# Patient Record
Sex: FEMALE | Race: WHITE | Employment: OTHER | URBAN - METROPOLITAN AREA
[De-identification: names, ages, dates, MRNs, and addresses within clinical notes are randomized per-mention and may not be internally consistent; named-entity substitution may affect disease eponyms.]

---

## 2017-01-30 ENCOUNTER — HOSPITAL ENCOUNTER (EMERGENCY)
Facility: CLINIC | Age: 77
Discharge: HOME | End: 2017-01-30
Attending: FAMILY MEDICINE | Admitting: FAMILY MEDICINE
Payer: MEDICARE

## 2017-01-30 VITALS
OXYGEN SATURATION: 95 % | SYSTOLIC BLOOD PRESSURE: 158 MMHG | DIASTOLIC BLOOD PRESSURE: 98 MMHG | HEART RATE: 94 BPM | RESPIRATION RATE: 20 BRPM | TEMPERATURE: 96.9 F

## 2017-01-30 DIAGNOSIS — F41.1 GENERALIZED ANXIETY DISORDER: ICD-10-CM

## 2017-01-30 DIAGNOSIS — F43.21 ADJUSTMENT DISORDER WITH DEPRESSED MOOD: ICD-10-CM

## 2017-01-30 LAB
ALBUMIN UR-MCNC: NEGATIVE MG/DL
ANION GAP SERPL CALCULATED.3IONS-SCNC: 8 MMOL/L (ref 3–14)
APPEARANCE UR: CLEAR
BASOPHILS # BLD AUTO: 0 10E9/L (ref 0–0.2)
BASOPHILS NFR BLD AUTO: 0.5 %
BILIRUB UR QL STRIP: NEGATIVE
BUN SERPL-MCNC: 19 MG/DL (ref 7–30)
CALCIUM SERPL-MCNC: 9.3 MG/DL (ref 8.5–10.1)
CAOX CRY #/AREA URNS HPF: ABNORMAL /HPF
CHLORIDE SERPL-SCNC: 106 MMOL/L (ref 94–109)
CO2 SERPL-SCNC: 33 MMOL/L (ref 20–32)
COLOR UR AUTO: YELLOW
CREAT SERPL-MCNC: 0.86 MG/DL (ref 0.52–1.04)
DIFFERENTIAL METHOD BLD: NORMAL
EOSINOPHIL # BLD AUTO: 0.1 10E9/L (ref 0–0.7)
EOSINOPHIL NFR BLD AUTO: 1.1 %
ERYTHROCYTE [DISTWIDTH] IN BLOOD BY AUTOMATED COUNT: 14.6 % (ref 10–15)
GFR SERPL CREATININE-BSD FRML MDRD: 64 ML/MIN/1.7M2
GLUCOSE SERPL-MCNC: 112 MG/DL (ref 70–99)
GLUCOSE UR STRIP-MCNC: NEGATIVE MG/DL
HCT VFR BLD AUTO: 40.2 % (ref 35–47)
HGB BLD-MCNC: 12.8 G/DL (ref 11.7–15.7)
HGB UR QL STRIP: ABNORMAL
IMM GRANULOCYTES # BLD: 0 10E9/L (ref 0–0.4)
IMM GRANULOCYTES NFR BLD: 0.2 %
KETONES UR STRIP-MCNC: NEGATIVE MG/DL
LEUKOCYTE ESTERASE UR QL STRIP: NEGATIVE
LYMPHOCYTES # BLD AUTO: 2.1 10E9/L (ref 0.8–5.3)
LYMPHOCYTES NFR BLD AUTO: 32.5 %
MCH RBC QN AUTO: 28.9 PG (ref 26.5–33)
MCHC RBC AUTO-ENTMCNC: 31.8 G/DL (ref 31.5–36.5)
MCV RBC AUTO: 91 FL (ref 78–100)
MONOCYTES # BLD AUTO: 0.5 10E9/L (ref 0–1.3)
MONOCYTES NFR BLD AUTO: 8.4 %
NEUTROPHILS # BLD AUTO: 3.6 10E9/L (ref 1.6–8.3)
NEUTROPHILS NFR BLD AUTO: 57.3 %
NITRATE UR QL: NEGATIVE
PH UR STRIP: 6 PH (ref 5–7)
PLATELET # BLD AUTO: 257 10E9/L (ref 150–450)
POTASSIUM SERPL-SCNC: 3.6 MMOL/L (ref 3.4–5.3)
RBC # BLD AUTO: 4.43 10E12/L (ref 3.8–5.2)
RBC #/AREA URNS AUTO: ABNORMAL /HPF (ref 0–2)
SODIUM SERPL-SCNC: 147 MMOL/L (ref 133–144)
SP GR UR STRIP: 1.01 (ref 1–1.03)
TSH SERPL DL<=0.005 MIU/L-ACNC: 2.04 MU/L (ref 0.4–4)
URN SPEC COLLECT METH UR: ABNORMAL
UROBILINOGEN UR STRIP-ACNC: 0.2 EU/DL (ref 0.2–1)
WBC # BLD AUTO: 6.3 10E9/L (ref 4–11)
WBC #/AREA URNS AUTO: ABNORMAL /HPF (ref 0–2)

## 2017-01-30 PROCEDURE — 84443 ASSAY THYROID STIM HORMONE: CPT | Performed by: FAMILY MEDICINE

## 2017-01-30 PROCEDURE — 85025 COMPLETE CBC W/AUTO DIFF WBC: CPT | Performed by: FAMILY MEDICINE

## 2017-01-30 PROCEDURE — 93005 ELECTROCARDIOGRAM TRACING: CPT

## 2017-01-30 PROCEDURE — 80048 BASIC METABOLIC PNL TOTAL CA: CPT | Performed by: FAMILY MEDICINE

## 2017-01-30 PROCEDURE — 90791 PSYCH DIAGNOSTIC EVALUATION: CPT

## 2017-01-30 PROCEDURE — 99285 EMERGENCY DEPT VISIT HI MDM: CPT | Mod: 25

## 2017-01-30 PROCEDURE — 81001 URINALYSIS AUTO W/SCOPE: CPT | Performed by: FAMILY MEDICINE

## 2017-01-30 ASSESSMENT — ENCOUNTER SYMPTOMS
SHORTNESS OF BREATH: 0
WEAKNESS: 1
MUSCULOSKELETAL NEGATIVE: 1
NECK PAIN: 0
ALLERGIC/IMMUNOLOGIC NEGATIVE: 1
LIGHT-HEADEDNESS: 0
RESPIRATORY NEGATIVE: 1
APPETITE CHANGE: 0
CONSTIPATION: 0
HEMATURIA: 0
CHEST TIGHTNESS: 0
FEVER: 0
HEADACHES: 0
PALPITATIONS: 0
DYSURIA: 0
CHILLS: 0
FATIGUE: 1
FLANK PAIN: 0
VOMITING: 0
NAUSEA: 0
NUMBNESS: 0
DIAPHORESIS: 0
EYES NEGATIVE: 1
ENDOCRINE NEGATIVE: 1

## 2017-01-30 NOTE — ED AVS SNAPSHOT
[facility] Emergency Department    [address] [place] [address]    Phone:  [phone]    Fax:  [phone]                                       Monik Santiago   MRN: [id number]    Department:  [facility] Emergency Department   Date of Visit:  1/30/2017           After Visit Summary Signature Page     I have received my discharge instructions, and my questions have been answered. I have discussed any challenges I see with this plan with the nurse or doctor.    ..........................................................................................................................................  Patient/Patient Representative Signature      ..........................................................................................................................................  Patient Representative Print Name and Relationship to Patient    ..................................................               ................................................  Date                                            Time    ..........................................................................................................................................  Reviewed by Signature/Title    ...................................................              ..............................................  Date                                                            Time

## 2017-01-30 NOTE — ED AVS SNAPSHOT
[facility] Emergency Department    [address]     [place] [address]    Phone:  [phone]    Fax:  [phone]                                       Monik Santiago   MRN: [id number]    Department:  [facility] Emergency Department   Date of Visit:  1/30/2017           Patient Information     Date Of Birth          1940        Your diagnoses for this visit were:     Adjustment disorder with depressed mood     Generalized anxiety disorder        You were seen by Kaz Harrell DO.      Follow-up Information     Follow up with Ken Mar MD.    Specialty:  Internal Medicine    Why:  for recheck this next month    Contact information:    [facility]  [address] [place] [address] [phone]          Follow up with [facility] Emergency Department.    Specialty:  EMERGENCY MEDICINE    Why:  If symptoms worsen    Contact information:    [address]   [place] [address] [phone]    Additional information:    From [address] beds at the [facility] [address]: Exit at [address] on [address] side of [place]. Turn right on [address]. Turn left at stoplight on [address]. [facility] will be in view two blocks ahead        Discharge Instructions       Please read and follow the handout(s) instructions. Return, if needed, for increased or worsening symptoms and as directed by the handout(s).    I sent a request to our hospital social  to contact you and your family later this week about options for alternative living options for you.     Continue your current medications. Your heart and metabolic screening test done in the ER [facility] all looked normal.    Electronically signed, Kaz Harrell DO          Discharge References/Attachments     ADJUSTMENT DISORDER (ENGLISH)      24 Hour Appointment Hotline       To make an appointment at any [facility], call [phone] ([phone]). If you don't have a family doctor  or clinic, we will help you find one. [facility] are conveniently located to serve the needs of you and your family.             Review of your medicines      Our records show that you are taking the medicines listed below. If these are incorrect, please call your family doctor or clinic.        Dose / Directions Last dose taken    ACETAMINOPHEN PO   Dose:  500-1000 mg        Take 500-1,000 mg by mouth every 8 hours as needed for pain   Refills:  0        ascorbic acid 250 MG Chew chewable tablet   Commonly known as:  vitamin C   Dose:  250 mg        Take 250 mg by mouth daily   Refills:  0        ASPIRIN PO   Dose:  81 mg        Take 81 mg by mouth daily   Refills:  0        atorvastatin 40 MG tablet   Commonly known as:  LIPITOR   Dose:  40 mg   Quantity:  90 tablet        Take 1 tablet (40 mg) by mouth daily   Refills:  3        budesonide 3 MG EC capsule   Commonly known as:  ENTOCORT EC   Dose:  3 mg        Take 3 mg by mouth every other day   Refills:  0        * CYMBALTA PO   Dose:  20 mg        Take 20 mg by mouth daily   Refills:  0        * DULoxetine 20 MG EC capsule   Commonly known as:  CYMBALTA   Dose:  20 mg   Quantity:  60 capsule        Take 1 capsule (20 mg) by mouth daily   Refills:  1        furosemide 20 MG tablet   Commonly known as:  LASIX   Dose:  10 mg   Quantity:  45 tablet        Take 0.5 tablets (10 mg) by mouth daily   Refills:  1        lisinopril 5 MG tablet   Commonly known as:  PRINIVIL/ZESTRIL   Dose:  5 mg   Quantity:  90 tablet        Take 1 tablet (5 mg) by mouth daily   Refills:  2        Lysine 500 MG Tabs   Dose:  1 tablet        Take 1 tablet by mouth daily   Refills:  0        metoprolol 25 MG tablet   Commonly known as:  LOPRESSOR   Dose:  12.5 mg   Quantity:  90 tablet        Take 0.5 tablets (12.5 mg) by mouth 2 times daily   Refills:  3        MULTIVITAMIN PO   Dose:  1 tablet        Take 1 tablet by mouth daily   Refills:  0        NITROGLYCERIN SL   Dose:  0.4 mg         Place 0.4 mg under the tongue   Refills:  0        ondansetron 4 MG tablet   Commonly known as:  ZOFRAN   Dose:  4 mg   Quantity:  10 tablet        Take 1 tablet (4 mg) by mouth every 6 hours as needed for nausea   Refills:  0        oxyCODONE 5 MG IR tablet   Commonly known as:  ROXICODONE   Dose:  5-10 mg   Quantity:  30 tablet        Take 1-2 tablets (5-10 mg) by mouth every 6 hours as needed for moderate to severe pain   Refills:  0        saccharomyces boulardii 250 MG capsule   Commonly known as:  FLORASTOR   Dose:  250 mg        Take 250 mg by mouth 2 times daily   Refills:  0        * SEROQUEL PO   Dose:  25 mg        Take 25 mg by mouth every 4 hours as needed   Refills:  0        * QUEtiapine 25 MG tablet   Commonly known as:  SEROQUEL   Quantity:  30 tablet        1 tab every 4 hours as needed, max of 3 in 24 hours   Refills:  1        * TRAZODONE HCL PO   Dose:  50 mg        Take 50 mg by mouth nightly as needed for sleep   Refills:  0        * traZODone 50 MG tablet   Commonly known as:  DESYREL   Dose:  50 mg   Quantity:  30 tablet        Take 1 tablet (50 mg) by mouth nightly as needed for sleep   Refills:  1        * Notice:  This list has 6 medication(s) that are the same as other medications prescribed for you. Read the directions carefully, and ask your doctor or other care provider to review them with you.            Procedures and tests performed during your visit     Basic metabolic panel    CBC with platelets differential    EKG 12-lead, tracing only    TSH with free T4 reflex    UA reflex to Microscopic and Culture    Urine Microscopic      Orders Needing Specimen Collection     None      Pending Results     No orders found from 1/29/2017 to 1/31/2017.            Pending Culture Results     No orders found from 1/29/2017 to 1/31/2017.            Thank you for choosing [facility]       Thank you for choosing [facility] for your care. Our goal is always to provide you with excellent care.  "Hearing back from our patients is one way we can continue to improve our services. Please take a few minutes to complete the written survey that you may receive in the mail after you visit with us. Thank you!        [employer] Information     [employer] lets you send messages to your doctor, view your test results, renew your prescriptions, schedule appointments and more. To sign up, go to [email or web address] . Click on \"Log in\" on the left side of the screen, which will take you to the Welcome page. Then click on \"Sign up Now\" on the right side of the page.     You will be asked to enter the access code listed below, as well as some personal information. Please follow the directions to create your username and password.     Your access code is: [email or web address]  Expires: 2017  2:33 PM     Your access code will  in 90 days. If you need help or a new code, please call your [place] clinic or [phone].        Care EveryWhere ID     This is your Care EveryWhere ID. This could be used by other organizations to access your [facility] medical records  [id number]        After Visit Summary       This is your record. Keep this with you and show to your community pharmacist(s) and doctor(s) at your next visit.                  "

## 2017-01-31 ENCOUNTER — CARE COORDINATION (OUTPATIENT)
Dept: CARE COORDINATION | Facility: CLINIC | Age: 77
End: 2017-01-31

## 2017-01-31 NOTE — ED PROVIDER NOTES
"  History     Chief Complaint   Patient presents with     Stress     The history is provided by the patient and the EMS personnel.     Monik Santiago is a 76 year old female with a history of dementia who presents to the ED via EMS with suicidal threats. Patient has been living with her daughter and son-in-law for the past 8 years. The last couple of days they have gotten into arguments. Patient was diagnosed with dementia in November, 2 months ago. Her daughter has been encouraging her to live in a nursing home but the patient refuses. The last couple of days the patient has been saying \" I rather die then live in a nursing home.\" Patient states she is not suicidal that she said that because her daughter made her so upset. She thinks her daughter is to emotional. Patient wants to move to her other [facility] house who lives in [place] but the daughter has said no as she is an alcoholic. EMS reported that they had the sense that the daughter did not want her mother back in their home. Patient has a history of anxiety, colitis and hypertension and is taking medication for them. Her colitis has been \"on and off.\" EMS reported she has been cooperative in transport to the ER. Glucose at the scene was 94.    Nurse had called daughter, note below:   Spoke with Daughter Maria E Maloney [phone].  She expressed her frustrations with their current living situation as things are becoming a \"hostile environment.\"  Family had patient assess in [place] and they were advised that she could benefit from assisted living as she has been isolating herself in a sitting room in [facility] and moderate dementia.  Daughter states patient has heart history and should be doing therapy and she is not wanting to do the therapies or anything during the day.  Daughter has been talking with other siblings and they have decided that an assisted living might be a better living situation for Monik.  Daughter confronted patient today about " "living situation and she became upset and stated \"she would rather be dead then live in a nursing home.\"  Daughter left and when she came back she talked to a friend with medical background who told her she needed to take her mother serious and to call the authorities, Crisis center was called by daughter and they thought it might not be a bad idea to have patient evaluated.  Daughter does not feel that she wants her mother back in the house, stating she would rather \"live with my alcoholic sister then here.\"  Informed daughter we would call her back with an update if okay with patient.      Patient Active Problem List   Diagnosis     Other alteration of consciousness     Generalized anxiety disorder     GERD (gastroesophageal reflux disease)     Osteoarthritis     Hyperlipidemia LDL goal <70     Hypertension goal BP (blood pressure) < 140/90     Advanced directives, counseling/discussion     Acute myocardial infarction (H)     Hypoxia     NSTEMI (non-ST elevated myocardial infarction), history     Chest pain, atypical     ASCVD (arteriosclerotic cardiovascular disease)     CKD (chronic kidney disease) stage 3, GFR 30-59 ml/min     Acute worsening of stage 3 chronic kidney disease     Systolic CHF, chronic (H)     Anemia     DVT prophylaxis     Lichen sclerosus et atrophicus of the vulva     Near syncope     Coronary atherosclerosis of native coronary artery (VESSEL)     Abdominal pain, unspecified abdominal location     Health Care Home     Syncope     Pseudoseizure     Acidosis-metabolic     Nausea without vomiting     Recent repair of hiatal hernia 1/30/15     Urine retention - singh placed 2/4/15     Bilateral leg edema     Old myocardial infarction 2012     Lightheadedness     Lightheaded     Altered mental status     Headache     History of C.diff colitis     Anxiety     History of MRI of brain and brain stem     RUQ abdominal pain     S/P laparoscopic cholecystectomy     Past Medical History   Diagnosis Date "     Other alteration of consciousness 7/2007     see neuro consult 7/25/2007. MinCep THOMSON was neg. Does not have seizures.      Other and unspecified hyperlipidemia      Generalized anxiety disorder      GERD (gastroesophageal reflux disease)      Syncope 10/19/2009     related to BP medications     Unspecified essential hypertension      Clostridium difficile diarrhea      Coronary artery disease      Myocardial infarction (H)      NSTEMI     History of MRI of brain and brain stem 4/10/2015     MR BRAIN FOR STROKE COMPLETE W/O & W CONTRAST 4/9/2015 9:34 PM MRI of the brain without and with contrast MRA of the head without contrast MRA of the neck without and with contrast History: eval for PRESS, Comparison: 3/19/2015,  Technique:  Brain: Axial diffusion-weighted with ADC map, T2-weighted with fat saturation, T1-weighted and turboFLAIR and coronal T1-weighted images of the brain were obt       Past Surgical History   Procedure Laterality Date     C nonspecific procedure       abd hernia repair     Hc remv cataract extracap,insert lens,comp       darrian     Hc yag laser capsulotomy  9/17/2009     Right eye     Hc ugi endoscopy diag w biopsy  12/16/09     C total knee arthroplasty  08/23/10     Right knee     Mohs micrographic procedure  09/14/11     left upper forehead-09/14/11     Cardiac stents       Heart cath, angioplasty  10/03/2012     Stent of LAD     Heart cath, angioplasty  05/17/2014      CAD, patent stent of LAD     Esophagoscopy, gastroscopy, duodenoscopy (egd), combined N/A 12/17/2014     Procedure: COMBINED ESOPHAGOSCOPY, GASTROSCOPY, DUODENOSCOPY (EGD);  Surgeon: Kaz Mccoy MD;  Location: [facility]     Laparoscopic herniorrhaphy hiatal N/A 1/30/2015     Procedure: LAPAROSCOPIC HERNIORRHAPHY HIATAL;  Surgeon: Chase Camara MD;  Location: [facility]     Laparoscopic nissen fundoplication N/A 1/30/2015     Procedure: LAPAROSCOPIC NISSEN FUNDOPLICATION;  Surgeon: Chase Camara MD;  Location:  [facility]     Colonoscopy N/A 2/17/2016     Procedure: COMBINED COLONOSCOPY, SINGLE OR MULTIPLE BIOPSY/POLYPECTOMY BY BIOPSY;  Surgeon: Jose Gan MD;  Location: [facility]     Laparoscopic cholecystectomy N/A 12/3/2016     Procedure: LAPAROSCOPIC CHOLECYSTECTOMY;  Surgeon: Viral Meyers MD;  Location: [facility]       Family History   Problem Relation Age of Onset     DIABETES No family hx of      Cardiovascular Brother      Cardiovascular Father      Cardiovascular Mother      Hypertension Mother      Lipids Mother      Cardiovascular Sister      stents x 2-3     Hypertension Sister      Cardiovascular Sister      MI 64       Social History   Substance Use Topics     Smoking status: Never Smoker      Smokeless tobacco: Never Used     Alcohol Use: No        Immunization History   Administered Date(s) Administered     Influenza (High Dose) 3 valent vaccine 11/15/2011, 10/22/2012, 10/08/2013, 01/13/2015, 11/08/2016     Influenza (IIV3) 10/21/2009, 10/04/2010     Pneumococcal 23 valent 10/21/2009     TDAP (BOOSTRIX AGES 10-64) 05/16/2012          Allergies   Allergen Reactions     Codeine Fatigue     Sulfa Drugs Hives     Trimethoprim Hives     Valium Fatigue       Current Outpatient Prescriptions   Medication Sig Dispense Refill     traZODone (DESYREL) 50 MG tablet Take 1 tablet (50 mg) by mouth nightly as needed for sleep 30 tablet 1     lisinopril (PRINIVIL/ZESTRIL) 5 MG tablet Take 1 tablet (5 mg) by mouth daily 90 tablet 2     DULoxetine (CYMBALTA) 20 MG EC capsule Take 1 capsule (20 mg) by mouth daily 60 capsule 1     QUEtiapine (SEROQUEL) 25 MG tablet 1 tab every 4 hours as needed, max of 3 in 24 hours 30 tablet 1     ondansetron (ZOFRAN) 4 MG tablet Take 1 tablet (4 mg) by mouth every 6 hours as needed for nausea 10 tablet 0     oxyCODONE (ROXICODONE) 5 MG IR tablet Take 1-2 tablets (5-10 mg) by mouth every 6 hours as needed for moderate to severe pain 30 tablet 0     TRAZODONE HCL PO Take 50 mg by mouth  nightly as needed for sleep       QUEtiapine Fumarate (SEROQUEL PO) Take 25 mg by mouth every 4 hours as needed       DULoxetine HCl (CYMBALTA PO) Take 20 mg by mouth daily       furosemide (LASIX) 20 MG tablet Take 0.5 tablets (10 mg) by mouth daily 45 tablet 1     metoprolol (LOPRESSOR) 25 MG tablet Take 0.5 tablets (12.5 mg) by mouth 2 times daily 90 tablet 3     budesonide (ENTOCORT EC) 3 MG 24 hr capsule Take 3 mg by mouth every other day       atorvastatin (LIPITOR) 40 MG tablet Take 1 tablet (40 mg) by mouth daily 90 tablet 3     saccharomyces boulardii (FLORASTOR) 250 MG capsule Take 250 mg by mouth 2 times daily       NITROGLYCERIN SL Place 0.4 mg under the tongue       ASPIRIN PO Take 81 mg by mouth daily       ACETAMINOPHEN PO Take 500-1,000 mg by mouth every 8 hours as needed for pain       ascorbic acid (VITAMIN C) 250 MG CHEW Take 250 mg by mouth daily       Lysine 500 MG TABS Take 1 tablet by mouth daily       Multiple Vitamins-Minerals (MULTIVITAMIN OR) Take 1 tablet by mouth daily         I have reviewed the Medications, Allergies, Past Medical and Surgical History, and Social History in the Epic system.    Review of Systems   Constitutional: Positive for fatigue. Negative for fever, chills, diaphoresis and appetite change.        Positive for stress.    HENT: Negative.    Eyes: Negative.    Respiratory: Negative.  Negative for chest tightness and shortness of breath.    Cardiovascular: Negative for chest pain, palpitations and leg swelling.   Gastrointestinal: Negative for nausea, vomiting and constipation.   Endocrine: Negative.    Genitourinary: Negative.  Negative for dysuria, hematuria and flank pain.   Musculoskeletal: Negative.  Negative for neck pain.   Skin: Negative.  Negative for rash.   Allergic/Immunologic: Negative.    Neurological: Positive for weakness (generalized). Negative for light-headedness, numbness and headaches.   Psychiatric/Behavioral: Negative for suicidal ideas.   All  other systems reviewed and are negative.      Physical Exam   Pulse: 94  Temp: 96.8  F (36  C)  Resp: 18  SpO2: 99 %  Physical Exam   Constitutional: She is oriented to person, place, and time. Vital signs are normal. She appears well-developed and well-nourished. She is active and cooperative. No distress.   HENT:   Head: Normocephalic and atraumatic.   Eyes: Conjunctivae and EOM are normal. Pupils are equal, round, and reactive to light.   Glasses     Neck: Normal range of motion. Neck supple. No JVD present. Carotid bruit is not present.   Cardiovascular: Normal rate, regular rhythm, normal heart sounds, intact distal pulses and normal pulses.    Pulmonary/Chest: Effort normal and breath sounds normal. She has no wheezes. She has no rhonchi. She has no rales.   Abdominal: Soft. She exhibits no pulsatile midline mass. There is no tenderness.   Musculoskeletal: Normal range of motion.   Neurological: She is alert and oriented to person, place, and time. She has normal strength.   Skin: Skin is warm and intact. No rash noted. She is not diaphoretic.   Psychiatric: Her speech is normal and behavior is normal. Her mood appears anxious. Her affect is not angry, not labile and not inappropriate. Thought content is not delusional. She does not express impulsivity. She does not exhibit a depressed mood. She expresses no homicidal ideation. Suicidal: denies sucidal plan or thoughts. She states she was just frustrated with the living situation in her daughter's home. She expresses no suicidal plans and no homicidal plans.   No significant memory impairment identified on exam today.   Nursing note and vitals reviewed.      ED Course   Procedures             Critical Care time:  none          Results for orders placed or performed during the hospital encounter of 01/30/17 (from the past 24 hour(s))   UA reflex to Microscopic and Culture   Result Value Ref Range    Color Urine Yellow     Appearance Urine Clear     Glucose  Urine Negative NEG mg/dL    Bilirubin Urine Negative NEG    Ketones Urine Negative NEG mg/dL    Specific Gravity Urine 1.015 1.003 - 1.035    Blood Urine Trace (A) NEG    pH Urine 6.0 5.0 - 7.0 pH    Protein Albumin Urine Negative NEG mg/dL    Urobilinogen Urine 0.2 0.2 - 1.0 EU/dL    Nitrite Urine Negative NEG    Leukocyte Esterase Urine Negative NEG    Source Unspecified Urine    Urine Microscopic   Result Value Ref Range    WBC Urine O - 2 0 - 2 /HPF    RBC Urine 2-5 (A) 0 - 2 /HPF    Calcium Oxalate Few (A) NEG /HPF   CBC with platelets differential   Result Value Ref Range    WBC 6.3 4.0 - 11.0 10e9/L    RBC Count 4.43 3.8 - 5.2 10e12/L    Hemoglobin 12.8 11.7 - 15.7 g/dL    Hematocrit 40.2 35.0 - 47.0 %    MCV 91 78 - 100 fl    MCH 28.9 26.5 - 33.0 pg    MCHC 31.8 31.5 - 36.5 g/dL    RDW 14.6 10.0 - 15.0 %    Platelet Count 257 150 - 450 10e9/L    Diff Method Automated Method     % Neutrophils 57.3 %    % Lymphocytes 32.5 %    % Monocytes 8.4 %    % Eosinophils 1.1 %    % Basophils 0.5 %    % Immature Granulocytes 0.2 %    Absolute Neutrophil 3.6 1.6 - 8.3 10e9/L    Absolute Lymphocytes 2.1 0.8 - 5.3 10e9/L    Absolute Monocytes 0.5 0.0 - 1.3 10e9/L    Absolute Eosinophils 0.1 0.0 - 0.7 10e9/L    Absolute Basophils 0.0 0.0 - 0.2 10e9/L    Abs Immature Granulocytes 0.0 0 - 0.4 10e9/L   Basic metabolic panel   Result Value Ref Range    Sodium 147 (H) 133 - 144 mmol/L    Potassium 3.6 3.4 - 5.3 mmol/L    Chloride 106 94 - 109 mmol/L    Carbon Dioxide 33 (H) 20 - 32 mmol/L    Anion Gap 8 3 - 14 mmol/L    Glucose 112 (H) 70 - 99 mg/dL    Urea Nitrogen 19 7 - 30 mg/dL    Creatinine 0.86 0.52 - 1.04 mg/dL    GFR Estimate 64 >60 mL/min/1.7m2    GFR Estimate If Black 77 >60 mL/min/1.7m2    Calcium 9.3 8.5 - 10.1 mg/dL   TSH with free T4 reflex   Result Value Ref Range    TSH 2.04 0.40 - 4.00 mU/L         Assessments & Plan (with Medical Decision Making)    76-year-old female presenting to the emergency room via  ambulance from her daughter's home where she has lived for the last 8 years because of concerns about suicidal ideation.  Patient admits to making a statement stating that she be better off dead than to continue living in her current situation with her daughter.  Patient states that she would rather live with her other daughter who lives in [place].  She admits that this daughter in [place] and is an alcoholic but states that she did well with living with her in the past.  The patient's daughter was contacted and is frustrated with the patient's unwillingness to do any rehabilitation for her dementia and cardiac issues.  The daughter is currently unwilling to take her mother back to her home.  Metabolic work up done because of some concerns about weakness in the part of the patient. Her metabolic and cardiac screening exam is unremarkable for abnormality. DEC evaluation performed and no acute inpatient stay recommended. Resources for counseling services were identified and given to the patient. The patient's daughter did call back to the ER and stated she would be willing to come to get her mother. I sent an order for a social service consult  To possible help to identify other alternative living arrangements for the patient. No new medications prescribed.       I have reviewed the nursing notes.    I have reviewed the findings, diagnosis, plan and need for follow up with the patient and her daughter.    Discharge Medication List as of 1/30/2017 11:44 PM        I discussed the findings of the evaluation today in the ER with her daughter. I have discussed with Monik's daughter the suggested treatment(s) as described in the discharge instructions and handouts.      I have suggested to her daughter to have her follow-up in her clinic or return to the ER for increased symptoms. See the follow-up recommendations documented  in the after visit summary in this visit's EPIC chart.      Final diagnoses:   Adjustment  disorder with depressed mood   Generalized anxiety disorder   This document serves as a record of services personally performed by Kaz Harrell DO. It was created on their behalf by Yessy Kunz, a trained medical scribe. The creation of this record is based on the provider's personal observations and the statements of the patient. This document has been checked and approved by the attending provider.   Note: Chart documentation done in part with Dragon Voice Recognition software. Although reviewed after completion, some word and grammatical errors may remain.        1/30/2017   [facility] EMERGENCY DEPARTMENT      Kaz Harrell DO  01/31/17 0156

## 2017-01-31 NOTE — DISCHARGE INSTRUCTIONS
Please read and follow the handout(s) instructions. Return, if needed, for increased or worsening symptoms and as directed by the handout(s).    I sent a request to our hospital social  to contact you and your family later this week about options for alternative living options for you.     Continue your current medications. Your heart and metabolic screening test done in the [facility] all looked normal.    Electronically signed, Kaz Harrell DO

## 2017-01-31 NOTE — PROGRESS NOTES
Clinic Care Coordination Contact 1/31/17  Care Team Conversations-Social Work    Received referral from the clinic at [place] as they had a VM on their phone line asking for a return call to the pt's daughter as the pt was int he ED last night. This writer contacted Maria E, pt's daughter, and introduced self and role. Maria E explained that her mother lives with her in her own home and she has been her primary care giver.  She said she is feeling overwhelmed with the pt and her cares. She spoke to the pt about her concerns and explained to her that she wanted her to move to an AL or LTC. The pt became upset with her. Maria E was asking what options she has and what her next step should be. She has been in contact with [facility] for a [place] choice assessment. Discussion about the process of choosing a facility and placing pt on a waiting list. List of facilities in the area were provided. Maria E states the pt has approx 8000.00 in her savings and makes about 1000 per month. Maria E will assist the pt in applying for MA either with the On license of [facility] or [employer].     Maria E explained that the pt had been in the [facility] a while back and at the time of discharge, they were recommending AL. Maria E opted to bring the pt home instead of placing her at an AL at that time.     Conversation with Maria E about applying for MA, touring AL/LTC facilities and placing her mother on waiting lists. She will call this writer back after she has completed these tasks and we can discuss temporary supports she can put into the home until the pt is accepted.      GRETCHEN Mcrae  Care Coordinator Social Work    [facility]  [phone]  1/31/2017 12:00 PM

## 2017-02-13 ENCOUNTER — OFFICE VISIT (OUTPATIENT)
Dept: INTERNAL MEDICINE | Facility: CLINIC | Age: 77
End: 2017-02-13
Payer: MEDICARE

## 2017-02-13 VITALS
TEMPERATURE: 98.4 F | WEIGHT: 148 LBS | SYSTOLIC BLOOD PRESSURE: 144 MMHG | HEART RATE: 88 BPM | RESPIRATION RATE: 16 BRPM | DIASTOLIC BLOOD PRESSURE: 76 MMHG | OXYGEN SATURATION: 95 % | BODY MASS INDEX: 26.22 KG/M2

## 2017-02-13 DIAGNOSIS — F43.23 ADJUSTMENT DISORDER WITH MIXED ANXIETY AND DEPRESSED MOOD: Primary | ICD-10-CM

## 2017-02-13 DIAGNOSIS — I10 ESSENTIAL HYPERTENSION WITH GOAL BLOOD PRESSURE LESS THAN 140/90: ICD-10-CM

## 2017-02-13 PROCEDURE — 99214 OFFICE O/P EST MOD 30 MIN: CPT | Performed by: INTERNAL MEDICINE

## 2017-02-13 RX ORDER — METOPROLOL TARTRATE 25 MG/1
25 TABLET, FILM COATED ORAL 2 TIMES DAILY
Qty: 180 TABLET | Refills: 3 | Status: SHIPPED | OUTPATIENT
Start: 2017-02-13 | End: 2018-02-23

## 2017-02-13 RX ORDER — LISINOPRIL 5 MG/1
5 TABLET ORAL 2 TIMES DAILY
Qty: 180 TABLET | Refills: 2 | Status: SHIPPED | OUTPATIENT
Start: 2017-02-13 | End: 2017-08-08

## 2017-02-13 RX ORDER — DULOXETIN HYDROCHLORIDE 20 MG/1
20 CAPSULE, DELAYED RELEASE ORAL 2 TIMES DAILY
Qty: 180 CAPSULE | Refills: 3 | Status: SHIPPED | OUTPATIENT
Start: 2017-02-13 | End: 2018-01-23

## 2017-02-13 ASSESSMENT — ANXIETY QUESTIONNAIRES
7. FEELING AFRAID AS IF SOMETHING AWFUL MIGHT HAPPEN: NOT AT ALL
2. NOT BEING ABLE TO STOP OR CONTROL WORRYING: SEVERAL DAYS
6. BECOMING EASILY ANNOYED OR IRRITABLE: NOT AT ALL
5. BEING SO RESTLESS THAT IT IS HARD TO SIT STILL: NOT AT ALL
GAD7 TOTAL SCORE: 5
IF YOU CHECKED OFF ANY PROBLEMS ON THIS QUESTIONNAIRE, HOW DIFFICULT HAVE THESE PROBLEMS MADE IT FOR YOU TO DO YOUR WORK, TAKE CARE OF THINGS AT HOME, OR GET ALONG WITH OTHER PEOPLE: SOMEWHAT DIFFICULT
1. FEELING NERVOUS, ANXIOUS, OR ON EDGE: MORE THAN HALF THE DAYS
3. WORRYING TOO MUCH ABOUT DIFFERENT THINGS: SEVERAL DAYS

## 2017-02-13 ASSESSMENT — PATIENT HEALTH QUESTIONNAIRE - PHQ9: 5. POOR APPETITE OR OVEREATING: SEVERAL DAYS

## 2017-02-13 ASSESSMENT — PAIN SCALES - GENERAL: PAINLEVEL: NO PAIN (0)

## 2017-02-13 NOTE — MR AVS SNAPSHOT
"              After Visit Summary   2017    Monik Santiago    MRN: [id number]           Patient Information     Date Of Birth          1940        Visit Information        Provider Department      2017 3:00 PM Ken Mar MD [facility]         Follow-ups after your visit        Who to contact     If you have questions or need follow up information about today's clinic visit or your schedule please contact [facility] directly at [phone].  Normal or non-critical lab and imaging results will be communicated to you by MyChart, letter or phone within 4 business days after the clinic has received the results. If you do not hear from us within 7 days, please contact the clinic through [employer] or phone. If you have a critical or abnormal lab result, we will notify you by phone as soon as possible.  Submit refill requests through [employer] or call your pharmacy and they will forward the refill request to us. Please allow 3 business days for your refill to be completed.          Additional Information About Your Visit        [employer] [facility] Information     [employer] lets you send messages to your doctor, view your test results, renew your prescriptions, schedule appointments and more. To sign up, go to [email or web address] . Click on \"Log in\" on the left side of the screen, which will take you to the Welcome page. Then click on \"Sign up Now\" on the right side of the page.     You will be asked to enter the access code listed below, as well as some personal information. Please follow the directions to create your username and password.     Your access code is: [email or web address]  Expires: 2017  3:10 PM     Your access code will  in 90 days. If you need help or a new code, please call your [facility] or [phone].        Care EveryWhere ID     This is your Care EveryWhere ID. This could be used by other organizations to access your [place] medical " records  [id number]        Your Vitals Were     Pulse Temperature Respirations Pulse Oximetry BMI (Body Mass Index)       88 98.4  F (36.9  C) (Temporal) 16 95% 26.22 kg/m2        Blood Pressure from Last 3 Encounters:   02/13/17 144/76   01/30/17 (!) 158/98   12/05/16 156/70    Weight from Last 3 Encounters:   02/13/17 148 lb (67.1 kg)   12/15/16 149 lb 3.2 oz (67.7 kg)   12/03/16 186 lb 4.6 oz (84.5 kg)              Today, you had the following     No orders found for display       Primary Care Provider Office Phone # Fax #    Ken Mar [phone]       [facility] [address] [place] [address]        Thank you!     Thank you for choosing [facility]  for your care. Our goal is always to provide you with excellent care. Hearing back from our patients is one way we can continue to improve our services. Please take a few minutes to complete the written survey that you may receive in the mail after your visit with us. Thank you!             Your Updated Medication List - Protect others around you: Learn how to safely use, store and throw away your medicines at www.disposemymeds.org.          This list is accurate as of: 2/13/17  3:10 PM.  Always use your most recent med list.                   Brand Name Dispense Instructions for use    ACETAMINOPHEN PO      Take 500-1,000 mg by mouth every 8 hours as needed for pain       ascorbic acid 250 MG Chew chewable tablet    vitamin C     Take 250 mg by mouth daily       ASPIRIN PO      Take 81 mg by mouth daily       atorvastatin 40 MG tablet    LIPITOR    90 tablet    Take 1 tablet (40 mg) by mouth daily       budesonide 3 MG EC capsule    ENTOCORT EC     Take 3 mg by mouth every other day       DULoxetine 20 MG EC capsule    CYMBALTA    60 capsule    Take 1 capsule (20 mg) by mouth daily       furosemide 20 MG tablet    LASIX    45 tablet    Take 0.5 tablets (10 mg) by mouth daily       lisinopril 5 MG tablet     PRINIVIL/ZESTRIL    90 tablet    Take 1 tablet (5 mg) by mouth daily       Lysine 500 MG Tabs      Take 1 tablet by mouth daily       metoprolol 25 MG tablet    LOPRESSOR    90 tablet    Take 0.5 tablets (12.5 mg) by mouth 2 times daily       MULTIVITAMIN PO      Take 1 tablet by mouth daily       NITROGLYCERIN SL      Place 0.4 mg under the tongue       saccharomyces boulardii 250 MG capsule    FLORASTOR     Take 250 mg by mouth 2 times daily       traZODone 50 MG tablet    DESYREL    30 tablet    Take 1 tablet (50 mg) by mouth nightly as needed for sleep

## 2017-02-13 NOTE — NURSING NOTE
"Chief Complaint   Patient presents with     Depression     Anxiety       Initial /76 (BP Location: Right arm, Patient Position: Chair, Cuff Size: Adult Regular)  Pulse 88  Temp 98.4  F (36.9  C) (Temporal)  Resp 16  Wt 148 lb (67.1 kg)  SpO2 95%  BMI 26.22 kg/m2 Estimated body mass index is 26.22 kg/(m^2) as calculated from the following:    Height as of 12/3/16: 5' 2.99\" (1.6 m).    Weight as of this encounter: 148 lb (67.1 kg).  Medication Reconciliation: complete    "

## 2017-02-13 NOTE — PROGRESS NOTES
SUBJECTIVE:                                                    Monik Santiago is a 76 year old female who presents to clinic today for the following health issues:      Chief Complaint   Patient presents with     Depression     Anxiety       A few concerns, one being blood pressure and being up some.    Depression and on cymbalta and if that needs to go up.  Then having anxiety as well.      Had a fight with her daughter and was in the ER on January 30. Her daughter didn't want her there for a while, patient wanted to go to [place], daughter there isn't in a position to care for her.      Patient feels better now being upstairs.  She is brought in by her son in law today.      BP has been higher lately, maybe with stress.    Less falling lately while upstairs.    Past Medical History   Diagnosis Date     Clostridium difficile diarrhea      Coronary artery disease      Generalized anxiety disorder      GERD (gastroesophageal reflux disease)      History of MRI of brain and brain stem 4/10/2015     MR BRAIN FOR STROKE COMPLETE W/O & W CONTRAST 4/9/2015 9:34 PM MRI of the brain without and with contrast MRA of the head without contrast MRA of the neck without and with contrast History: deniz for PRESS, Comparison: 3/19/2015,  Technique:  Brain: Axial diffusion-weighted with ADC map, T2-weighted with fat saturation, T1-weighted and turboFLAIR and coronal T1-weighted images of the brain were obt     Myocardial infarction (H)      NSTEMI     Other alteration of consciousness 7/2007     see neuro consult 7/25/2007. MinCep THOMSON was neg. Does not have seizures.      Other and unspecified hyperlipidemia      Syncope 10/19/2009     related to BP medications     Unspecified essential hypertension      Current Outpatient Prescriptions   Medication     DULoxetine (CYMBALTA) 20 MG EC capsule     lisinopril (PRINIVIL/ZESTRIL) 5 MG tablet     metoprolol (LOPRESSOR) 25 MG tablet     traZODone (DESYREL) 50 MG tablet     furosemide  (LASIX) 20 MG tablet     budesonide (ENTOCORT EC) 3 MG 24 hr capsule     atorvastatin (LIPITOR) 40 MG tablet     saccharomyces boulardii (FLORASTOR) 250 MG capsule     ASPIRIN PO     ascorbic acid (VITAMIN C) 250 MG CHEW     Lysine 500 MG TABS     Multiple Vitamins-Minerals (MULTIVITAMIN OR)     [DISCONTINUED] lisinopril (PRINIVIL/ZESTRIL) 5 MG tablet     [DISCONTINUED] DULoxetine (CYMBALTA) 20 MG EC capsule     [DISCONTINUED] TRAZODONE HCL PO     [DISCONTINUED] DULoxetine HCl (CYMBALTA PO)     [DISCONTINUED] metoprolol (LOPRESSOR) 25 MG tablet     NITROGLYCERIN SL     ACETAMINOPHEN PO     No current facility-administered medications for this visit.      Social History   Substance Use Topics     Smoking status: Never Smoker     Smokeless tobacco: Never Used     Alcohol use No     Review of Systems  Constitutional-No fevers, chills, or weight changes..  ENT-No earpain, sore throat, voice changes or rhinitis.  Cardiac-No chest pain or palpitations.  Respiratory-No cough, sob, or hemoptysis.  GI-No nausea, vomitting, diarrhea, constipation, or blood in the stool.  Psych-depression, anxiety, and arguing at home.    Physical Exam  /76 (BP Location: Right arm, Patient Position: Chair, Cuff Size: Adult Regular)  Pulse 88  Temp 98.4  F (36.9  C) (Temporal)  Resp 16  Wt 148 lb (67.1 kg)  SpO2 95%  BMI 26.22 kg/m2  General Appearance-healthy, alert, no distress  Appropriate, not arguing with her son in law.    BP is higher on recheck,   Pulse is regular.    MIGUEL ANGEL score is ok.  Denies suicide today.    ASSESSMENT:  Patient with depression, anxiety and some mild cognitive disorder.  Not clear why she has this.  Will increase her cymbalta. She was on seroquel but got too sedated and will avoid that and the gabapentin. Will continue to live with her daughter and family, patient is ok with this. They are evaluating possible assisted living options but really want to keep her at home.      HTN will increase metoprolol to full  pill and increase lisinopril to 5 mg bid.  This should help bp come down.    Offered SW to help them but they say they are working with two SW already.    Electronically signed by Ken Mar MD      ASSESSMENT:      Electronically signed by Ken Mar MD

## 2017-02-14 ENCOUNTER — CARE COORDINATION (OUTPATIENT)
Dept: CARE COORDINATION | Facility: CLINIC | Age: 77
End: 2017-02-14

## 2017-02-14 ASSESSMENT — ANXIETY QUESTIONNAIRES: GAD7 TOTAL SCORE: 5

## 2017-02-14 ASSESSMENT — PATIENT HEALTH QUESTIONNAIRE - PHQ9: SUM OF ALL RESPONSES TO PHQ QUESTIONS 1-9: 13

## 2017-02-14 NOTE — PROGRESS NOTES
Clinic Care Coordination Contact 2/14/17  Care Team Conversations-Social Work    Phone call made to pt's daughter this afternoon to f/u on our previous conversation. Pt's daughter states the pt is doing better as they discovered she was experiencing symptoms of depression. She said she is now taking medications and the family is feeling much more optimistic about her staying with them in their home. They did have the [place] assessment done so they know what options are available to her if they need them. Discussion about whether or not she wanted this writer to continue to follow and assist them. Pt's daughter states she feels that they do not need help at this time. This writer explained they can call CC at any time and that they can ask their PCP to be connected with me if they would like. She thanked this writer    CC will no longer follow. Please re-refer if needs arise.    Bia Matamoros, [facility]  Care Coordinator Social Work    [facility]  [phone]  2/14/2017 3:00 PM

## 2017-02-15 DIAGNOSIS — F41.1 GENERALIZED ANXIETY DISORDER: ICD-10-CM

## 2017-02-16 NOTE — TELEPHONE ENCOUNTER
Trazodone        Last Written Prescription Date: 12/16/2016  Last Fill Quantity: 30; # refills: 1  Last Office Visit with [facility], [facility] or  Health prescribing provider:  2/13/2017        Last PHQ-9 score on record=   PHQ-9 SCORE 2/13/2017   Total Score 13       Lab Results   Component Value Date    AST 25 12/05/2016     Lab Results   Component Value Date    ALT 12 12/05/2016

## 2017-02-21 RX ORDER — TRAZODONE HYDROCHLORIDE 50 MG/1
TABLET, FILM COATED ORAL
Qty: 30 TABLET | Refills: 11 | Status: SHIPPED | OUTPATIENT
Start: 2017-02-21 | End: 2017-08-17

## 2017-03-22 ENCOUNTER — TRANSFERRED RECORDS (OUTPATIENT)
Dept: HEALTH INFORMATION MANAGEMENT | Facility: CLINIC | Age: 77
End: 2017-03-22

## 2017-03-22 LAB — PHQ9 SCORE: 0

## 2017-08-08 ENCOUNTER — OFFICE VISIT (OUTPATIENT)
Dept: CARDIOLOGY | Facility: CLINIC | Age: 77
End: 2017-08-08
Payer: MEDICARE

## 2017-08-08 VITALS
DIASTOLIC BLOOD PRESSURE: 86 MMHG | OXYGEN SATURATION: 97 % | SYSTOLIC BLOOD PRESSURE: 166 MMHG | BODY MASS INDEX: 25.98 KG/M2 | HEART RATE: 62 BPM | HEIGHT: 63 IN | WEIGHT: 146.6 LBS

## 2017-08-08 DIAGNOSIS — I25.10 CORONARY ARTERY DISEASE INVOLVING NATIVE CORONARY ARTERY OF NATIVE HEART WITHOUT ANGINA PECTORIS: ICD-10-CM

## 2017-08-08 DIAGNOSIS — R00.2 PALPITATIONS: Primary | ICD-10-CM

## 2017-08-08 DIAGNOSIS — I10 ESSENTIAL HYPERTENSION WITH GOAL BLOOD PRESSURE LESS THAN 140/90: ICD-10-CM

## 2017-08-08 PROCEDURE — 99213 OFFICE O/P EST LOW 20 MIN: CPT | Performed by: INTERNAL MEDICINE

## 2017-08-08 RX ORDER — LISINOPRIL 10 MG/1
10 TABLET ORAL 2 TIMES DAILY
Qty: 180 TABLET | Refills: 3 | Status: ON HOLD | OUTPATIENT
Start: 2017-08-08 | End: 2017-08-18

## 2017-08-08 NOTE — LETTER
8/8/2017      RE: Monik Santiago  [address] [facility] [address]       Dear Colleague,    Thank you for the opportunity to participate in the care of your patient, Monik Santiago, at the [facility]. Please see a copy of my visit note below.    HPI and Plan:   Ms. Santiago is a very pleasant 77-year-old female who has been seen by my colleague, Dr. Mcdowell, in the past. I saw the patient first time in January 2016.  The patient has history of CAD with history of LAD PCI in 2012, and then she underwent repeat coronary angiogram in 2014 for chest discomfort and was found to have non-flow-limiting coronary artery disease.  It was felt that her symptoms were likely from a hiatal hernia for which she had surgery and since then her symptoms have resolved. Today she is coming for routine follow-up for CAD. She is accompanied by her daughter. Patient tells me overall she feels well, no chest discomfort or shortness of breath at rest or with physical activity. She has noted some fluttering sensation in the chest and at times fast heart rate for a few seconds this happens at least once a week. If she gets up too quickly she does get a little dizzy no presyncope or syncope or fall. She sees Dr. Mar for hypertension management. She is on lisinopril 5 mg b.i.d., metoprolol tartrate 25 mg b.i.d. Additionally she is on Lipitor 40 mg q. daily and baby aspirin. LDL is well controlled at 45. Initially her blood pressure was quite elevated today in the clinic with systolic in 170-180s and subsequent check confirm the blood pressure is still elevated with systolic in mid 160s and diastolic in mid 80s. She is quite compliant with current medications.    Assessment and plan  A very delightful 77-year-old female with history of CAD, other comorbidities of hypertension. Overall cardiac status-wise she doesn't appear to have any anginal symptoms or symptoms of  congestive heart failure. Remarkably her blood pressure is not well controlled as noted above. At this time I recommend increasing lisinopril from 5 mg b.i.d. to 10 m b.i.d. She sees Dr. Mar for blood pressure control and I recommend patient be seen back in  clinic in one to 2 weeks time. I also offered patient a follow-up in our clinic with an JOCELYN but they would prefer to be seen in Dr. Mar's clinic and they will call Dr. Mar's clinic to arrange a follow-up in one to 2 weeks time. She does have some intermittent symptoms suggestive of orthostasis and I recommend patient to be careful getting up and to have a support and then walk gradually. Regarding palpitation this sounds like skipped beat sensation and may be a brief run of arrhythmia and I recommend doing an even monitor for one week. My office is going to update her with the results of the event monitor and further recommendations will depend upon that. In case no significant abnormalities are seen, we can see her back in one year, sooner if any changes in clinical status.      Orders Placed This Encounter   Procedures     Cardiac Event Monitor - Peds/Adult       Orders Placed This Encounter   Medications     lisinopril (PRINIVIL/ZESTRIL) 10 MG tablet     Sig: Take 1 tablet (10 mg) by mouth 2 times daily     Dispense:  180 tablet     Refill:  3       Medications Discontinued During This Encounter   Medication Reason     lisinopril (PRINIVIL/ZESTRIL) 5 MG tablet Reorder         Encounter Diagnoses   Name Primary?     Essential hypertension with goal blood pressure less than 140/90      Palpitations Yes       CURRENT MEDICATIONS:  Current Outpatient Prescriptions   Medication Sig Dispense Refill     lisinopril (PRINIVIL/ZESTRIL) 10 MG tablet Take 1 tablet (10 mg) by mouth 2 times daily 180 tablet 3     furosemide (LASIX) 20 MG tablet TAKE HALF A TABLET BY MOUTH ONCE DAILY 45 tablet 2     DULoxetine (CYMBALTA) 20 MG EC capsule Take 1 capsule (20 mg) by  mouth 2 times daily 180 capsule 3     metoprolol (LOPRESSOR) 25 MG tablet Take 1 tablet (25 mg) by mouth 2 times daily 180 tablet 3     budesonide (ENTOCORT EC) 3 MG 24 hr capsule Take 3 mg by mouth every other day       atorvastatin (LIPITOR) 40 MG tablet Take 1 tablet (40 mg) by mouth daily 90 tablet 3     saccharomyces boulardii (FLORASTOR) 250 MG capsule Take 250 mg by mouth 2 times daily       ASPIRIN PO Take 81 mg by mouth daily       ACETAMINOPHEN PO Take 500-1,000 mg by mouth every 8 hours as needed for pain       ascorbic acid (VITAMIN C) 250 MG CHEW Take 250 mg by mouth daily       Lysine 500 MG TABS Take 1 tablet by mouth daily       Multiple Vitamins-Minerals (MULTIVITAMIN OR) Take 1 tablet by mouth daily       traZODone (DESYREL) 50 MG tablet TAKE 1 TABLET (50 MG) BY MOUTH NIGHTLY AS NEEDED FOR SLEEP (Patient not taking: Reported on 8/8/2017) 30 tablet 11     [DISCONTINUED] lisinopril (PRINIVIL/ZESTRIL) 5 MG tablet Take 1 tablet (5 mg) by mouth 2 times daily 180 tablet 2     NITROGLYCERIN SL Place 0.4 mg under the tongue         ALLERGIES     Allergies   Allergen Reactions     Codeine Fatigue     Sulfa Drugs Hives     Trimethoprim Hives     Valium Fatigue       PAST MEDICAL HISTORY:  Past Medical History:   Diagnosis Date     Clostridium difficile diarrhea      Coronary artery disease      Generalized anxiety disorder      GERD (gastroesophageal reflux disease)      History of MRI of brain and brain stem 4/10/2015    MR BRAIN FOR STROKE COMPLETE W/O & W CONTRAST 4/9/2015 9:34 PM MRI of the brain without and with contrast MRA of the head without contrast MRA of the neck without and with contrast History: eval for PRESS, Comparison: 3/19/2015,  Technique:  Brain: Axial diffusion-weighted with ADC map, T2-weighted with fat saturation, T1-weighted and turboFLAIR and coronal T1-weighted images of the brain were obt     Myocardial infarction (H)     NSTEMI     Other alteration of consciousness 7/2007    see  neuro consult 7/25/2007. MinCep BERTO was neg. Does not have seizures.      Other and unspecified hyperlipidemia      Syncope 10/19/2009    related to BP medications     Unspecified essential hypertension        PAST SURGICAL HISTORY:  Past Surgical History:   Procedure Laterality Date     C NONSPECIFIC PROCEDURE      abd hernia repair     C TOTAL KNEE ARTHROPLASTY  08/23/10    Right knee     Cardiac stents       COLONOSCOPY N/A 2/17/2016    Procedure: COMBINED COLONOSCOPY, SINGLE OR MULTIPLE BIOPSY/POLYPECTOMY BY BIOPSY;  Surgeon: Jose Gan MD;  Location: [facility]     ESOPHAGOSCOPY, GASTROSCOPY, DUODENOSCOPY (EGD), COMBINED N/A 12/17/2014    Procedure: COMBINED ESOPHAGOSCOPY, GASTROSCOPY, DUODENOSCOPY (EGD);  Surgeon: Kaz Mccoy MD;  Location: [facility]     HC REMV CATARACT EXTRACAP,INSERT LENS,COMP      darrian     HC UGI ENDOSCOPY DIAG W BIOPSY  12/16/09     HC YAG LASER CAPSULOTOMY  9/17/2009    Right eye     HEART CATH, ANGIOPLASTY  10/03/2012    Stent of LAD     HEART CATH, ANGIOPLASTY  05/17/2014     CAD, patent stent of LAD     LAPAROSCOPIC CHOLECYSTECTOMY N/A 12/3/2016    Procedure: LAPAROSCOPIC CHOLECYSTECTOMY;  Surgeon: Viral Meyers MD;  [facility]: [facility]     LAPAROSCOPIC HERNIORRHAPHY HIATAL N/A 1/30/2015    Procedure: LAPAROSCOPIC HERNIORRHAPHY HIATAL;  Surgeon: Chase Camara MD;  [facility]     LAPAROSCOPIC NISSEN FUNDOPLICATION N/A 1/30/2015    Procedure: LAPAROSCOPIC NISSEN FUNDOPLICATION;  Surgeon: Chase Camara MD;  Location: [facility]     MOHS MICROGRAPHIC PROCEDURE  09/14/11    left upper forehead-09/14/11       FAMILY HISTORY:  Family History   Problem Relation Age of Onset     DIABETES No family hx of      Cardiovascular Brother      Cardiovascular Father      Cardiovascular Mother      Hypertension Mother      Lipids Mother      Cardiovascular Sister      stents x 2-3     Hypertension Sister      Cardiovascular Sister      MI 64       SOCIAL HISTORY:  Social  "History     Social History     Marital status:      Spouse name: N/A     Number of children: N/A     Years of education: N/A     Occupational History     retired      Social History Main Topics     Smoking status: Never Smoker     Smokeless tobacco: Never Used     Alcohol use No     Drug use: No     Sexual activity: No     Other Topics Concern     Not on file     Social History Narrative    Lives with daughters family since June 2007. Moved from [place].       Review of Systems:  Skin:  Negative       Eyes:  Positive for glasses    ENT:  Negative      Respiratory:  Negative       Cardiovascular:  Negative for;palpitations;chest pain Positive for;edema;lightheadedness;dizziness    Gastroenterology: Negative      Genitourinary:  Negative      Musculoskeletal:  Positive for arthritis;back pain;neck pain;joint pain    Neurologic:  Positive for   looses balance real easy  Psychiatric:  Positive for anxiety    Heme/Lymph/Imm:  Positive for allergies    Endocrine:  Positive for night sweats terrible sweats any time during the day or night     Physical Exam:  Vitals: /86 (BP Location: Right arm, Patient Position: Fowlers, Cuff Size: Adult Large)  Pulse 62  Ht 1.6 m (5' 3\")  Wt 66.5 kg (146 lb 9.6 oz)  SpO2 97%  BMI 25.97 kg/m2    General- appears comfortable  Neck- normal JVP, no bruit  Cardiovascular system- ejection systolic click over the right upper sternal border, no m/r/g  Respiratory system- CTA b/l  Abdomen- soft, non tender  Extremities- no pedal edema  Neurological - alert, oriented  Psych- normal affect  HEENT- no pallor    Yung MD Stanley      CC  No referring provider defined for this encounter.  Please cc this note to Dr Ken Mar.            "

## 2017-08-08 NOTE — MR AVS SNAPSHOT
"              After Visit Summary   8/8/2017    Monik Santiago    MRN: [id number]           Patient Information     Date Of Birth          1940        Visit Information        Provider Department      8/8/2017 3:30 PM Yung Angel MD [facility]        Today's Diagnoses     Palpitations    -  1    Essential hypertension with goal blood pressure less than 140/90           Follow-ups after your visit        Future tests that were ordered for you today     Open Future Orders        Priority Expected Expires Ordered    Cardiac Event Monitor - Peds/Adult Routine 8/8/2017 8/8/2018 8/8/2017            Who to contact     If you have questions or need follow up information about today's clinic visit or your schedule please contact [facility] directly at [phone].  Normal or non-critical lab and imaging results will be communicated to you by MyChart, letter or phone within 4 business days after the clinic has received the results. If you do not hear from us within 7 days, please contact the clinic through MyChart or phone. If you have a critical or abnormal lab result, we will notify you by phone as soon as possible.  Submit refill requests through [facility] or call your pharmacy and they will forward the refill request to us. Please allow 3 business days for your refill to be completed.          Additional Information About Your Visit        MyChart Information     [facility]ini lets you send messages to your doctor, view your test results, renew your prescriptions, schedule appointments and more. To sign up, go to [email or web address] . Click on \"Log in\" on the left side of the screen, which will take you to the Welcome page. Then click on \"Sign up Now\" on the right side of the page.     You will be asked to enter the access code listed below, as well as some personal information. Please follow the directions to create your username and password.     Your access code is: " "[email or web address]  Expires: 2017  3:45 PM     Your access code will  in 90 days. If you need help or a new code, please call your [facility] or [phone].        Care EveryWhere ID     This is your Care EveryWhere ID. This could be used by other organizations to access your [facility] medical records  [id number]        Your Vitals Were     Pulse Height Pulse Oximetry BMI (Body Mass Index)          62 1.6 m (5' 3\") 97% 25.97 kg/m2         Blood Pressure from Last 3 Encounters:   17 166/86   17 144/76   17 (!) 158/98    Weight from Last 3 Encounters:   17 66.5 kg (146 lb 9.6 oz)   17 67.1 kg (148 lb)   12/15/16 67.7 kg (149 lb 3.2 oz)                 Today's Medication Changes          These changes are accurate as of: 17  3:53 PM.  If you have any questions, ask your nurse or doctor.               These medicines have changed or have updated prescriptions.        Dose/Directions    lisinopril 10 MG tablet   Commonly known as:  PRINIVIL/ZESTRIL   This may have changed:    - medication strength  - how much to take   Used for:  Essential hypertension with goal blood pressure less than 140/90        Dose:  10 mg   Take 1 tablet (10 mg) by mouth 2 times daily   Quantity:  180 tablet   Refills:  3            Where to get your medicines      These medications were sent to [place] [address] [place] [address] [address]    Hours:  M-F 9-7 SAT 9-6 SUN 11-3 Phone:  [phone]     lisinopril 10 MG tablet                Primary Care Provider Office Phone # Fax #    Ken Mar [phone]       [place] [address] [place] [address]        Equal Access to Services     [facility] AH: Claudio bazan Sokaur, waaxda luqadaha, qaybta kaalmadarlin basurto, francisca douglas. So [facility] [phone].    ATENCIÓN: Si juan pendleton, tiene a hamm disposición " servicios gratuitos de asistencia lingüística. Geni blas [phone].    We comply with applicable federal civil rights laws and [place] laws. We do not discriminate on the basis of race, color, national origin, age, disability sex, sexual orientation or gender identity.            Thank you!     Thank you for choosing [facility]  for your care. Our goal is always to provide you with excellent care. Hearing back from our patients is one way we can continue to improve our services. Please take a few minutes to complete the written survey that you may receive in the mail after your visit with us. Thank you!             Your Updated Medication List - Protect others around you: Learn how to safely use, store and throw away your medicines at www.disposemymeds.org.          This list is accurate as of: 8/8/17  3:53 PM.  Always use your most recent med list.                   Brand Name Dispense Instructions for use Diagnosis    ACETAMINOPHEN PO      Take 500-1,000 mg by mouth every 8 hours as needed for pain        ascorbic acid 250 MG Chew chewable tablet    vitamin C     Take 250 mg by mouth daily        ASPIRIN PO      Take 81 mg by mouth daily        atorvastatin 40 MG tablet    LIPITOR    90 tablet    Take 1 tablet (40 mg) by mouth daily    ASCVD (arteriosclerotic cardiovascular disease), Hyperlipidemia LDL goal <70       budesonide 3 MG EC capsule    ENTOCORT EC     Take 3 mg by mouth every other day        DULoxetine 20 MG EC capsule    CYMBALTA    180 capsule    Take 1 capsule (20 mg) by mouth 2 times daily    Adjustment disorder with mixed anxiety and depressed mood       furosemide 20 MG tablet    LASIX    45 tablet    TAKE HALF A TABLET BY MOUTH ONCE DAILY    Swelling of lower limb       lisinopril 10 MG tablet    PRINIVIL/ZESTRIL    180 tablet    Take 1 tablet (10 mg) by mouth 2 times daily    Essential hypertension with goal blood pressure less than 140/90       Lysine 500 MG Tabs      Take  1 tablet by mouth daily        metoprolol 25 MG tablet    LOPRESSOR    180 tablet    Take 1 tablet (25 mg) by mouth 2 times daily    Essential hypertension with goal blood pressure less than 140/90       MULTIVITAMIN PO      Take 1 tablet by mouth daily        NITROGLYCERIN SL      Place 0.4 mg under the tongue        saccharomyces boulardii 250 MG capsule    FLORASTOR     Take 250 mg by mouth 2 times daily        traZODone 50 MG tablet    DESYREL    30 tablet    TAKE 1 TABLET (50 MG) BY MOUTH NIGHTLY AS NEEDED FOR SLEEP    Generalized anxiety disorder

## 2017-08-08 NOTE — PROGRESS NOTES
HPI and Plan:   Ms. Santiago is a very pleasant 77-year-old female who has been seen by my colleague, Dr. Mcdowell, in the past. I saw the patient first time in January 2016.  The patient has history of CAD with history of LAD PCI in 2012, and then she underwent repeat coronary angiogram in 2014 for chest discomfort and was found to have non-flow-limiting coronary artery disease.  It was felt that her symptoms were likely from a hiatal hernia for which she had surgery and since then her symptoms have resolved. Today she is coming for routine follow-up for CAD. She is accompanied by her daughter. Patient tells me overall she feels well, no chest discomfort or shortness of breath at rest or with physical activity. She has noted some fluttering sensation in the chest and at times fast heart rate for a few seconds this happens at least once a week. If she gets up too quickly she does get a little dizzy no presyncope or syncope or fall. She sees Dr. Mar for hypertension management. She is on lisinopril 5 mg b.i.d., metoprolol tartrate 25 mg b.i.d. Additionally she is on Lipitor 40 mg q. daily and baby aspirin. LDL is well controlled at 45. Initially her blood pressure was quite elevated today in the clinic with systolic in 170-180s and subsequent check confirm the blood pressure is still elevated with systolic in mid 160s and diastolic in mid 80s. She is quite compliant with current medications.    Assessment and plan  A very delightful 77-year-old female with history of CAD, other comorbidities of hypertension. Overall cardiac status-wise she doesn't appear to have any anginal symptoms or symptoms of congestive heart failure. Remarkably her blood pressure is not well controlled as noted above. At this time I recommend increasing lisinopril from 5 mg b.i.d. to 10 m b.i.d. She sees Dr. Mar for blood pressure control and I recommend patient be seen back in  clinic in one to 2 weeks time. I also offered patient a  follow-up in our clinic with an JOCELYN but they would prefer to be seen in Dr. Mar's clinic and they will call Dr. Mar's clinic to arrange a follow-up in one to 2 weeks time. She does have some intermittent symptoms suggestive of orthostasis and I recommend patient to be careful getting up and to have a support and then walk gradually. Regarding palpitation this sounds like skipped beat sensation and may be a brief run of arrhythmia and I recommend doing an even monitor for one week. My office is going to update her with the results of the event monitor and further recommendations will depend upon that. In case no significant abnormalities are seen, we can see her back in one year, sooner if any changes in clinical status.      Orders Placed This Encounter   Procedures     Cardiac Event Monitor - Peds/Adult       Orders Placed This Encounter   Medications     lisinopril (PRINIVIL/ZESTRIL) 10 MG tablet     Sig: Take 1 tablet (10 mg) by mouth 2 times daily     Dispense:  180 tablet     Refill:  3       Medications Discontinued During This Encounter   Medication Reason     lisinopril (PRINIVIL/ZESTRIL) 5 MG tablet Reorder         Encounter Diagnoses   Name Primary?     Essential hypertension with goal blood pressure less than 140/90      Palpitations Yes       CURRENT MEDICATIONS:  Current Outpatient Prescriptions   Medication Sig Dispense Refill     lisinopril (PRINIVIL/ZESTRIL) 10 MG tablet Take 1 tablet (10 mg) by mouth 2 times daily 180 tablet 3     furosemide (LASIX) 20 MG tablet TAKE HALF A TABLET BY MOUTH ONCE DAILY 45 tablet 2     DULoxetine (CYMBALTA) 20 MG EC capsule Take 1 capsule (20 mg) by mouth 2 times daily 180 capsule 3     metoprolol (LOPRESSOR) 25 MG tablet Take 1 tablet (25 mg) by mouth 2 times daily 180 tablet 3     budesonide (ENTOCORT EC) 3 MG 24 hr capsule Take 3 mg by mouth every other day       atorvastatin (LIPITOR) 40 MG tablet Take 1 tablet (40 mg) by mouth daily 90 tablet 3      saccharomyces boulardii (FLORASTOR) 250 MG capsule Take 250 mg by mouth 2 times daily       ASPIRIN PO Take 81 mg by mouth daily       ACETAMINOPHEN PO Take 500-1,000 mg by mouth every 8 hours as needed for pain       ascorbic acid (VITAMIN C) 250 MG CHEW Take 250 mg by mouth daily       Lysine 500 MG TABS Take 1 tablet by mouth daily       Multiple Vitamins-Minerals (MULTIVITAMIN OR) Take 1 tablet by mouth daily       traZODone (DESYREL) 50 MG tablet TAKE 1 TABLET (50 MG) BY MOUTH NIGHTLY AS NEEDED FOR SLEEP (Patient not taking: Reported on 8/8/2017) 30 tablet 11     [DISCONTINUED] lisinopril (PRINIVIL/ZESTRIL) 5 MG tablet Take 1 tablet (5 mg) by mouth 2 times daily 180 tablet 2     NITROGLYCERIN SL Place 0.4 mg under the tongue         ALLERGIES     Allergies   Allergen Reactions     Codeine Fatigue     Sulfa Drugs Hives     Trimethoprim Hives     Valium Fatigue       PAST MEDICAL HISTORY:  Past Medical History:   Diagnosis Date     Clostridium difficile diarrhea      Coronary artery disease      Generalized anxiety disorder      GERD (gastroesophageal reflux disease)      History of MRI of brain and brain stem 4/10/2015    MR BRAIN FOR STROKE COMPLETE W/O & W CONTRAST 4/9/2015 9:34 PM MRI of the brain without and with contrast MRA of the head without contrast MRA of the neck without and with contrast History: eval for PRESS, Comparison: 3/19/2015,  Technique:  Brain: Axial diffusion-weighted with ADC map, T2-weighted with fat saturation, T1-weighted and turboFLAIR and coronal T1-weighted images of the brain were obt     Myocardial infarction (H)     NSTEMI     Other alteration of consciousness 7/2007    see neuro consult 7/25/2007. MinCep THOMSON was neg. Does not have seizures.      Other and unspecified hyperlipidemia      Syncope 10/19/2009    related to BP medications     Unspecified essential hypertension        PAST SURGICAL HISTORY:  Past Surgical History:   Procedure Laterality Date     C NONSPECIFIC PROCEDURE       abd hernia repair     C TOTAL KNEE ARTHROPLASTY  08/23/10    Right knee     Cardiac stents       COLONOSCOPY N/A 2/17/2016    Procedure: COMBINED COLONOSCOPY, SINGLE OR MULTIPLE BIOPSY/POLYPECTOMY BY BIOPSY;  Surgeon: Jose Gan MD;  Location: [facility]     ESOPHAGOSCOPY, GASTROSCOPY, DUODENOSCOPY (EGD), COMBINED N/A 12/17/2014    Procedure: COMBINED ESOPHAGOSCOPY, GASTROSCOPY, DUODENOSCOPY (EGD);  Surgeon: Kaz Mccoy MD;  Location: [facility]     HC REMV CATARACT EXTRACAP,INSERT LENS,COMP      darrian     HC UGI ENDOSCOPY DIAG W BIOPSY  12/16/09     HC YAG LASER CAPSULOTOMY  9/17/2009    Right eye     HEART CATH, ANGIOPLASTY  10/03/2012    Stent of LAD     HEART CATH, ANGIOPLASTY  05/17/2014     CAD, patent stent of LAD     LAPAROSCOPIC CHOLECYSTECTOMY N/A 12/3/2016    Procedure: LAPAROSCOPIC CHOLECYSTECTOMY;  Surgeon: Viral Meyers MD;  Location: [facility]     LAPAROSCOPIC HERNIORRHAPHY HIATAL N/A 1/30/2015    Procedure: LAPAROSCOPIC HERNIORRHAPHY HIATAL;  Surgeon: Chase Camara MD;  Location: [facility]     LAPAROSCOPIC NISSEN FUNDOPLICATION N/A 1/30/2015    Procedure: LAPAROSCOPIC NISSEN FUNDOPLICATION;  Surgeon: Chase Camara MD;  Location: [facility]     MOHS MICROGRAPHIC PROCEDURE  09/14/11    left upper forehead-09/14/11       FAMILY HISTORY:  Family History   Problem Relation Age of Onset     DIABETES No family hx of      Cardiovascular Brother      Cardiovascular Father      Cardiovascular Mother      Hypertension Mother      Lipids Mother      Cardiovascular Sister      stents x 2-3     Hypertension Sister      Cardiovascular Sister      MI 64       SOCIAL HISTORY:  Social History     Social History     Marital status:      Spouse name: N/A     Number of children: N/A     Years of education: N/A     Occupational History     retired      Social History Main Topics     Smoking status: Never Smoker     Smokeless tobacco: Never Used     Alcohol use No     Drug use: No     Sexual  "activity: No     Other Topics Concern     Not on file     Social History Narrative    Lives with daughters family since June 2007. Moved from [place].       Review of Systems:  Skin:  Negative       Eyes:  Positive for glasses    ENT:  Negative      Respiratory:  Negative       Cardiovascular:  Negative for;palpitations;chest pain Positive for;edema;lightheadedness;dizziness    Gastroenterology: Negative      Genitourinary:  Negative      Musculoskeletal:  Positive for arthritis;back pain;neck pain;joint pain    Neurologic:  Positive for   looses balance real easy  Psychiatric:  Positive for anxiety    Heme/Lymph/Imm:  Positive for allergies    Endocrine:  Positive for night sweats terrible sweats any time during the day or night     Physical Exam:  Vitals: /86 (BP Location: Right arm, Patient Position: Fowlers, Cuff Size: Adult Large)  Pulse 62  Ht 1.6 m (5' 3\")  Wt 66.5 kg (146 lb 9.6 oz)  SpO2 97%  BMI 25.97 kg/m2    General- appears comfortable  Neck- normal JVP, no bruit  Cardiovascular system- ejection systolic click over the right upper sternal border, no m/r/g  Respiratory system- CTA b/l  Abdomen- soft, non tender  Extremities- no pedal edema  Neurological - alert, oriented  Psych- normal affect  HEENT- no pallor          CC  No referring provider defined for this encounter.  Please cc this note to Dr Ken Mar.          "

## 2017-08-11 ENCOUNTER — HOSPITAL ENCOUNTER (OUTPATIENT)
Dept: CARDIOLOGY | Facility: CLINIC | Age: 77
Discharge: HOME | End: 2017-08-11
Attending: INTERNAL MEDICINE | Admitting: INTERNAL MEDICINE
Payer: MEDICARE

## 2017-08-11 DIAGNOSIS — R00.2 PALPITATIONS: ICD-10-CM

## 2017-08-11 PROCEDURE — 93270 REMOTE 30 DAY ECG REV/REPORT: CPT

## 2017-08-11 PROCEDURE — 93270 REMOTE 30 DAY ECG REV/REPORT: CPT | Performed by: INTERNAL MEDICINE

## 2017-08-11 PROCEDURE — 93272 ECG/REVIEW INTERPRET ONLY: CPT | Performed by: INTERNAL MEDICINE

## 2017-08-14 ENCOUNTER — DOCUMENTATION ONLY (OUTPATIENT)
Dept: CARDIOLOGY | Facility: CLINIC | Age: 77
End: 2017-08-14

## 2017-08-14 NOTE — PROGRESS NOTES
1 week Cardiac event monitor ordered per Dr. Angel at visit 8/8/17 to evaluate for arrhythmias due to palpitations.    24 hour transmission report received from [employer] for 8/11/17 with one strip at 1139 for a baseline recording showing sinus rhythm with HR of 64. Will send to file and continue to monitor. FRANSISCO Hdez RN

## 2017-08-17 ENCOUNTER — TELEPHONE (OUTPATIENT)
Dept: FAMILY MEDICINE | Facility: CLINIC | Age: 77
End: 2017-08-17

## 2017-08-17 ENCOUNTER — HOSPITAL ENCOUNTER (OUTPATIENT)
Facility: CLINIC | Age: 77
Setting detail: OBSERVATION
Discharge: HOME | End: 2017-08-18
Attending: FAMILY MEDICINE | Admitting: FAMILY MEDICINE
Payer: MEDICARE

## 2017-08-17 ENCOUNTER — APPOINTMENT (OUTPATIENT)
Dept: GENERAL RADIOLOGY | Facility: CLINIC | Age: 77
End: 2017-08-17
Attending: FAMILY MEDICINE
Payer: MEDICARE

## 2017-08-17 DIAGNOSIS — R52 PAIN: Primary | ICD-10-CM

## 2017-08-17 DIAGNOSIS — I25.2 OLD MYOCARDIAL INFARCTION: ICD-10-CM

## 2017-08-17 DIAGNOSIS — R07.9 CHEST PAIN, UNSPECIFIED TYPE: ICD-10-CM

## 2017-08-17 DIAGNOSIS — I10 ESSENTIAL HYPERTENSION WITH GOAL BLOOD PRESSURE LESS THAN 140/90: ICD-10-CM

## 2017-08-17 DIAGNOSIS — I25.10 ASCVD (ARTERIOSCLEROTIC CARDIOVASCULAR DISEASE): ICD-10-CM

## 2017-08-17 LAB
ALBUMIN SERPL-MCNC: 3.8 G/DL (ref 3.4–5)
ALP SERPL-CCNC: 95 U/L (ref 40–150)
ALT SERPL W P-5'-P-CCNC: 15 U/L (ref 0–50)
ANION GAP SERPL CALCULATED.3IONS-SCNC: 6 MMOL/L (ref 3–14)
AST SERPL W P-5'-P-CCNC: 18 U/L (ref 0–45)
BASOPHILS # BLD AUTO: 0.1 10E9/L (ref 0–0.2)
BASOPHILS NFR BLD AUTO: 0.9 %
BILIRUB SERPL-MCNC: 0.5 MG/DL (ref 0.2–1.3)
BUN SERPL-MCNC: 23 MG/DL (ref 7–30)
CALCIUM SERPL-MCNC: 8.9 MG/DL (ref 8.5–10.1)
CHLORIDE SERPL-SCNC: 108 MMOL/L (ref 94–109)
CO2 SERPL-SCNC: 31 MMOL/L (ref 20–32)
CREAT SERPL-MCNC: 0.9 MG/DL (ref 0.52–1.04)
D DIMER PPP FEU-MCNC: 0.5 UG/ML FEU (ref 0–0.5)
DIFFERENTIAL METHOD BLD: ABNORMAL
EOSINOPHIL # BLD AUTO: 0.1 10E9/L (ref 0–0.7)
EOSINOPHIL NFR BLD AUTO: 2.3 %
ERYTHROCYTE [DISTWIDTH] IN BLOOD BY AUTOMATED COUNT: 15 % (ref 10–15)
GFR SERPL CREATININE-BSD FRML MDRD: 61 ML/MIN/1.7M2
GLUCOSE SERPL-MCNC: 93 MG/DL (ref 70–99)
HCT VFR BLD AUTO: 39.1 % (ref 35–47)
HGB BLD-MCNC: 12.1 G/DL (ref 11.7–15.7)
IMM GRANULOCYTES # BLD: 0 10E9/L (ref 0–0.4)
IMM GRANULOCYTES NFR BLD: 0 %
LYMPHOCYTES # BLD AUTO: 1.9 10E9/L (ref 0.8–5.3)
LYMPHOCYTES NFR BLD AUTO: 34.8 %
MCH RBC QN AUTO: 29.1 PG (ref 26.5–33)
MCHC RBC AUTO-ENTMCNC: 30.9 G/DL (ref 31.5–36.5)
MCV RBC AUTO: 94 FL (ref 78–100)
MONOCYTES # BLD AUTO: 0.5 10E9/L (ref 0–1.3)
MONOCYTES NFR BLD AUTO: 9.7 %
NEUTROPHILS # BLD AUTO: 2.9 10E9/L (ref 1.6–8.3)
NEUTROPHILS NFR BLD AUTO: 52.3 %
NT-PROBNP SERPL-MCNC: 477 PG/ML (ref 0–1800)
PLATELET # BLD AUTO: 225 10E9/L (ref 150–450)
POTASSIUM SERPL-SCNC: 3.9 MMOL/L (ref 3.4–5.3)
PROT SERPL-MCNC: 7 G/DL (ref 6.8–8.8)
RBC # BLD AUTO: 4.16 10E12/L (ref 3.8–5.2)
SODIUM SERPL-SCNC: 145 MMOL/L (ref 133–144)
TROPONIN I SERPL-MCNC: <0.015 UG/L (ref 0–0.04)
WBC # BLD AUTO: 5.6 10E9/L (ref 4–11)

## 2017-08-17 PROCEDURE — 80053 COMPREHEN METABOLIC PANEL: CPT | Performed by: FAMILY MEDICINE

## 2017-08-17 PROCEDURE — 25000132 ZZH RX MED GY IP 250 OP 250 PS 637: Mod: GY | Performed by: FAMILY MEDICINE

## 2017-08-17 PROCEDURE — 99285 EMERGENCY DEPT VISIT HI MDM: CPT | Mod: 25 | Performed by: FAMILY MEDICINE

## 2017-08-17 PROCEDURE — 84484 ASSAY OF TROPONIN QUANT: CPT | Performed by: FAMILY MEDICINE

## 2017-08-17 PROCEDURE — 85025 COMPLETE CBC W/AUTO DIFF WBC: CPT | Performed by: FAMILY MEDICINE

## 2017-08-17 PROCEDURE — 99207 ZZC CDG-CODE CATEGORY CHANGED: CPT | Performed by: FAMILY MEDICINE

## 2017-08-17 PROCEDURE — 85379 FIBRIN DEGRADATION QUANT: CPT | Performed by: FAMILY MEDICINE

## 2017-08-17 PROCEDURE — 71020 XR CHEST 2 VW: CPT | Mod: TC

## 2017-08-17 PROCEDURE — 99220 ZZC INITIAL OBSERVATION CARE,LEVL III: CPT | Performed by: FAMILY MEDICINE

## 2017-08-17 PROCEDURE — G0378 HOSPITAL OBSERVATION PER HR: HCPCS

## 2017-08-17 PROCEDURE — 83880 ASSAY OF NATRIURETIC PEPTIDE: CPT | Performed by: FAMILY MEDICINE

## 2017-08-17 PROCEDURE — A9270 NON-COVERED ITEM OR SERVICE: HCPCS | Mod: GY | Performed by: FAMILY MEDICINE

## 2017-08-17 PROCEDURE — 84484 ASSAY OF TROPONIN QUANT: CPT | Mod: 91 | Performed by: FAMILY MEDICINE

## 2017-08-17 PROCEDURE — 36415 COLL VENOUS BLD VENIPUNCTURE: CPT | Performed by: FAMILY MEDICINE

## 2017-08-17 PROCEDURE — 93005 ELECTROCARDIOGRAM TRACING: CPT | Mod: 76 | Performed by: FAMILY MEDICINE

## 2017-08-17 PROCEDURE — 93010 ELECTROCARDIOGRAM REPORT: CPT | Mod: 76 | Performed by: FAMILY MEDICINE

## 2017-08-17 PROCEDURE — 93010 ELECTROCARDIOGRAM REPORT: CPT | Mod: Z6 | Performed by: FAMILY MEDICINE

## 2017-08-17 PROCEDURE — 93005 ELECTROCARDIOGRAM TRACING: CPT | Performed by: FAMILY MEDICINE

## 2017-08-17 RX ORDER — ASPIRIN 81 MG/1
324 TABLET, CHEWABLE ORAL ONCE
Status: DISCONTINUED | OUTPATIENT
Start: 2017-08-17 | End: 2017-08-17

## 2017-08-17 RX ORDER — FUROSEMIDE 20 MG/1
10 TABLET ORAL DAILY
Status: DISCONTINUED | OUTPATIENT
Start: 2017-08-18 | End: 2017-08-18 | Stop reason: HOSPADM

## 2017-08-17 RX ORDER — BIOTIN 10 MG
2 TABLET ORAL DAILY
COMMUNITY

## 2017-08-17 RX ORDER — ASPIRIN 81 MG/1
81 TABLET, CHEWABLE ORAL DAILY
Status: DISCONTINUED | OUTPATIENT
Start: 2017-08-18 | End: 2017-08-18 | Stop reason: HOSPADM

## 2017-08-17 RX ORDER — NITROGLYCERIN 0.4 MG/1
0.4 TABLET SUBLINGUAL EVERY 5 MIN PRN
Status: DISCONTINUED | OUTPATIENT
Start: 2017-08-17 | End: 2017-08-18 | Stop reason: HOSPADM

## 2017-08-17 RX ORDER — LIDOCAINE 40 MG/G
CREAM TOPICAL
Status: DISCONTINUED | OUTPATIENT
Start: 2017-08-17 | End: 2017-08-17

## 2017-08-17 RX ORDER — LISINOPRIL 10 MG/1
10 TABLET ORAL 2 TIMES DAILY
Status: DISCONTINUED | OUTPATIENT
Start: 2017-08-17 | End: 2017-08-17

## 2017-08-17 RX ORDER — ALUMINA, MAGNESIA, AND SIMETHICONE 2400; 2400; 240 MG/30ML; MG/30ML; MG/30ML
15-30 SUSPENSION ORAL EVERY 4 HOURS PRN
Status: DISCONTINUED | OUTPATIENT
Start: 2017-08-17 | End: 2017-08-18 | Stop reason: HOSPADM

## 2017-08-17 RX ORDER — ATORVASTATIN CALCIUM 40 MG/1
40 TABLET, FILM COATED ORAL AT BEDTIME
Status: DISCONTINUED | OUTPATIENT
Start: 2017-08-17 | End: 2017-08-18 | Stop reason: HOSPADM

## 2017-08-17 RX ORDER — NALOXONE HYDROCHLORIDE 0.4 MG/ML
.1-.4 INJECTION, SOLUTION INTRAMUSCULAR; INTRAVENOUS; SUBCUTANEOUS
Status: DISCONTINUED | OUTPATIENT
Start: 2017-08-17 | End: 2017-08-17

## 2017-08-17 RX ORDER — LOPERAMIDE HCL 2 MG
2 CAPSULE ORAL 4 TIMES DAILY PRN
COMMUNITY

## 2017-08-17 RX ORDER — METOPROLOL TARTRATE 25 MG/1
25 TABLET, FILM COATED ORAL 2 TIMES DAILY
Status: DISCONTINUED | OUTPATIENT
Start: 2017-08-17 | End: 2017-08-18

## 2017-08-17 RX ORDER — CLONIDINE HYDROCHLORIDE 0.1 MG/1
0.1 TABLET ORAL ONCE
Status: COMPLETED | OUTPATIENT
Start: 2017-08-17 | End: 2017-08-17

## 2017-08-17 RX ORDER — ACETAMINOPHEN 650 MG/1
650 SUPPOSITORY RECTAL EVERY 4 HOURS PRN
Status: DISCONTINUED | OUTPATIENT
Start: 2017-08-17 | End: 2017-08-18 | Stop reason: HOSPADM

## 2017-08-17 RX ORDER — ACETAMINOPHEN 325 MG/1
650 TABLET ORAL EVERY 4 HOURS PRN
Status: DISCONTINUED | OUTPATIENT
Start: 2017-08-17 | End: 2017-08-18 | Stop reason: HOSPADM

## 2017-08-17 RX ORDER — NALOXONE HYDROCHLORIDE 0.4 MG/ML
.1-.4 INJECTION, SOLUTION INTRAMUSCULAR; INTRAVENOUS; SUBCUTANEOUS
Status: DISCONTINUED | OUTPATIENT
Start: 2017-08-17 | End: 2017-08-18 | Stop reason: HOSPADM

## 2017-08-17 RX ORDER — LISINOPRIL 10 MG/1
10 TABLET ORAL 2 TIMES DAILY
Status: DISCONTINUED | OUTPATIENT
Start: 2017-08-17 | End: 2017-08-18 | Stop reason: HOSPADM

## 2017-08-17 RX ORDER — DULOXETIN HYDROCHLORIDE 20 MG/1
20 CAPSULE, DELAYED RELEASE ORAL 2 TIMES DAILY
Status: DISCONTINUED | OUTPATIENT
Start: 2017-08-17 | End: 2017-08-18 | Stop reason: HOSPADM

## 2017-08-17 RX ADMIN — LISINOPRIL 10 MG: 10 TABLET ORAL at 21:14

## 2017-08-17 RX ADMIN — ATORVASTATIN CALCIUM 40 MG: 40 TABLET, FILM COATED ORAL at 21:16

## 2017-08-17 RX ADMIN — DULOXETINE HYDROCHLORIDE 20 MG: 20 CAPSULE, DELAYED RELEASE ORAL at 21:15

## 2017-08-17 RX ADMIN — CLONIDINE HYDROCHLORIDE 0.1 MG: 0.1 TABLET ORAL at 17:59

## 2017-08-17 RX ADMIN — METOPROLOL TARTRATE 25 MG: 25 TABLET ORAL at 21:14

## 2017-08-17 ASSESSMENT — ENCOUNTER SYMPTOMS
BLOOD IN STOOL: 0
CHEST TIGHTNESS: 1
DIAPHORESIS: 1
SHORTNESS OF BREATH: 1
COUGH: 1
WEAKNESS: 1
ABDOMINAL PAIN: 0
FATIGUE: 1
DIZZINESS: 1
PALPITATIONS: 1

## 2017-08-17 NOTE — TELEPHONE ENCOUNTER
": 1940  PHONE #'s: [phone] (home)     PRESENTING PROBLEM:  Hx of MI. States she feels so very fatigued, weak. \" I will break out in a drenching cold- sweat just sitting here. I am also SOB and feel winded.  I could barely take my dog for a little walk yesterday. I barely made it back home.  I can't even wash the dishes. Hard to stand too long- I had to sit down I can barely walk up my steps. It just about kills me to walk upstairs. \"     NURSING ASSESSMENT  Description:   Her daughter is there with her. She is concerned about her mother's Sx and wants to know what to do?   Onset/duration:   Past few days, but seems to be getting worse. \" SHe saw her cardiologist at [facility] recently and was sent home with a heart monitor,but she is feeling worse than when she was in for her visit.   Precip. factors:   Hx of MI 3 to 4 years ago.   Assoc. Sx:  Weakness, fatigue, diaphoresis, SOB  Improves/worsens Sx:   worse  Pain scale (1-10)  \" I don't have pain in my arms like I did the first time I had a heart attack. \"  Sx specific meds:  The cardiologist had increased her Lisinopril .   LMP/preg/breast feeding:   NA  Last exam/Tx:  Has NOT been seen for this.     RECOMMENDED DISPOSITION:  To ED, another person to drive - Her daughter will bring her in. CAll 911 if cannot get her to car.  Will comply with recommendation: YES   If further questions/concerns or if Sx do not improve, worsen or new Sx develop, call your PCP or [facility] as soon as possible.    NOTES:  Disposition was determined by the first positive assessment question, therefore all previous assessment questions were negative.  Informed to check provider manual or call insurance company to assure coverage.    Guideline used: Fatigue/ Weakness/ Breathing problems/ Chest pain  Telephone Triage Protocols for Nurses, Fifth Edition, Laila Carpenter RN    "

## 2017-08-17 NOTE — IP AVS SNAPSHOT
MRN:[id number]                      After Visit Summary   8/17/2017    Monik Santiago    MRN: [id number]           Thank you!     Thank you for choosing [facility] for your care. Our goal is always to provide you with excellent care. Hearing back from our patients is one way we can continue to improve our services. Please take a few minutes to complete the written survey that you may receive in the mail after you visit with us. Thank you!        Patient Information     Date Of Birth          1940        About your hospital stay     You were admitted on:  August 17, 2017 You last received care in the:  [address]    You were discharged on:  August 18, 2017        Reason for your hospital stay       Chest tightness and discomfort with dizziness and shortness of breath which caused you to come into the hospital.  You heart evaluation has not shown any heart attack or irregular heart rhythm while you have been with us.  An echocardiogram also does not show any damage to your heart movement.  Please go home and send in your Holter monitor as scheduled and follow up with Dr. Angel in 2-3 weeks as scheduled.    During your stay, your blood pressure was initially quite high but is trending downward into the 150-160s for the top number while you were here.  Please increase your lisinopril to 20 mg twice a day and keep the rest of your medications the same but check your blood pressure 1-2 times a day and bring this in to review with the doctor at your follow up appointment.    Enjoy going home!                  Who to Call     For medical emergencies, please call 911.  For non-urgent questions about your medical care, please call your primary care provider or clinic, [phone]          Attending Provider     Provider Kaz Teixeira MD Family Practice    Octavio Roberts MD Family Practice       Primary Care Provider Office Phone # Fax #    Ken SIMONS  MD Zaid [phone]      After Care Instructions     Activity       Your activity upon discharge: activity as tolerated            Diet       Follow this diet upon discharge: Regular diet - consider a trial of a low salt diet (less than 2,000 mg daily)                  Follow-up Appointments     Follow-up and recommended labs and tests        Mail in the Holter monitor as directed.  Follow up with primary care provider, Ken Mar, or a partner within 7 days for hospital follow- up.  Please bring blood pressure readings to this appointment  Follow up with Dr. Angel in 2.5 weeks as scheduled.                  Your next 10 appointments already scheduled     Aug 22, 2017  8:45 AM CDT   SHORT with Ken Mar MD   [address] [address]   [phone]            Aug 30, 2017 11:00 AM CDT   Office Visit with Ken Mar MD   [address] [address]   [phone]           Bring a current list of meds and any records pertaining to this visit. For Physicals, please bring immunization records and any forms needing to be filled out. Please arrive 10 minutes early to complete paperwork.            Sep 05, 2017 10:30 AM CDT   Return Visit with Yung Angel MD   [facility] ([address] [address]   [phone]              Further instructions from your care team       Verified with the cardiology department that patient is supposed to have event/holter monitor discontinued today.  She is supposed to discontinue it, then should put it in the provided  and drop it in the mailbox to be returned to the company.  She received a  box when the monitor was placed.  It is a pre-paid return .    Pending Results     No orders found for last 3 day(s).           "  Statement of Approval     Ordered          17 1441  I have reviewed and agree with all the recommendations and orders detailed in this document.  EFFECTIVE NOW     Approved and electronically signed by:  Niru Hobbs MD             Admission Information     Date & Time Provider Department Dept. Phone    2017 Octavio Roberts MD [address] Medical [facility] [phone]      Your Vitals Were     Blood Pressure Pulse Temperature Respirations Height Weight    163/71 66 96.1  F (35.6  C) (Oral) 18 1.6 m (5' 3\") 66.5 kg (146 lb 9.7 oz)    Pulse Oximetry BMI (Body Mass Index)                94% 25.97 kg/m2          MyChart Information     [employer] lets you send messages to your doctor, view your test results, renew your prescriptions, schedule appointments and more. To sign up, go to [email or web address] . Click on \"Log in\" on the left side of the screen, which will take you to the Welcome page. Then click on \"Sign up Now\" on the right side of the page.     You will be asked to enter the access code listed below, as well as some personal information. Please follow the directions to create your username and password.     Your access code is: [email or web address]  Expires: 2017  3:45 PM     Your access code will  in 90 days. If you need help or a new code, please call your [place] clinic or [phone].        Care EveryWhere ID     This is your Care EveryWhere ID. This could be used by other organizations to access your [place] [facility] [facility] medical records  [id number]        Equal Access to Services     [facility]: Hadii leonora palumbo hadasho Sokokiali, waaxda luqadaha, qaybta kaalmada francisca basurto. So [facility] [phone].    ATENCIÓN: Si habla español, tiene a hamm disposición servicios gratuitos de asistencia lingüística. Llame al [phone].    We comply with applicable federal civil rights laws and [place] laws. We do not discriminate on the " basis of race, color, national origin, age, disability sex, sexual orientation or gender identity.               Review of your medicines      CONTINUE these medicines which may have CHANGED, or have new prescriptions. If we are uncertain of the size of tablets/capsules you have at home, strength may be listed as something that might have changed.        Dose / Directions    lisinopril 20 MG tablet   Commonly known as:  PRINIVIL/ZESTRIL   This may have changed:    - medication strength  - how much to take   Used for:  Essential hypertension with goal blood pressure less than 140/90        Dose:  20 mg   Take 1 tablet (20 mg) by mouth 2 times daily   Quantity:  60 tablet   Refills:  0         CONTINUE these medicines which have NOT CHANGED        Dose / Directions    ACETAMINOPHEN PO        Dose:  500-1000 mg   Take 500-1,000 mg by mouth every 8 hours as needed for pain   Refills:  0       ASPIRIN PO        Dose:  81 mg   Take 81 mg by mouth daily   Refills:  0       atorvastatin 40 MG tablet   Commonly known as:  LIPITOR   Used for:  ASCVD (arteriosclerotic cardiovascular disease), Hyperlipidemia LDL goal <70        Dose:  40 mg   Take 1 tablet (40 mg) by mouth daily   Quantity:  90 tablet   Refills:  3       DULoxetine 20 MG EC capsule   Commonly known as:  CYMBALTA   Used for:  Adjustment disorder with mixed anxiety and depressed mood        Dose:  20 mg   Take 1 capsule (20 mg) by mouth 2 times daily   Quantity:  180 capsule   Refills:  3       furosemide 20 MG tablet   Commonly known as:  LASIX   Used for:  Swelling of lower limb        TAKE HALF A TABLET BY MOUTH ONCE DAILY   Quantity:  45 tablet   Refills:  2       loperamide 2 MG capsule   Commonly known as:  IMODIUM   Indication:  Diarrhea        Dose:  2 mg   Take 2 mg by mouth 4 times daily as needed for diarrhea   Refills:  0       Lysine 500 MG Tabs        Dose:  1 tablet   Take 1 tablet by mouth daily as needed   Refills:  0       metoprolol 25 MG  tablet   Commonly known as:  LOPRESSOR   Used for:  Essential hypertension with goal blood pressure less than 140/90        Dose:  25 mg   Take 1 tablet (25 mg) by mouth 2 times daily   Quantity:  180 tablet   Refills:  3       MULTIVITAMIN ADULT Chew        Dose:  2 chew tab   Take 2 chew tab by mouth daily   Refills:  0       NITROGLYCERIN SL        Dose:  0.4 mg   Place 0.4 mg under the tongue   Refills:  0       PROBIOTIC DAILY PO        Dose:  1 capsule   Take 1 capsule by mouth daily   Refills:  0            Where to get your medicines      These medications were sent to [facility] [id number] [place] [address] [address]    Hours:  M-F 9-7 SAT 9-6 SUN 11-3 Phone:  [phone]     lisinopril 20 MG tablet                Protect others around you: Learn how to safely use, store and throw away your medicines at www.disposemymeds.org.             Medication List: This is a list of all your medications and when to take them. Check marks below indicate your daily home schedule. Keep this list as a reference.      Medications           Morning Afternoon Evening Bedtime As Needed    ACETAMINOPHEN PO   Take 500-1,000 mg by mouth every 8 hours as needed for pain                                ASPIRIN PO   Take 81 mg by mouth daily   Last time this was given:  81 mg on 8/18/2017  8:49 AM                                atorvastatin 40 MG tablet   Commonly known as:  LIPITOR   Take 1 tablet (40 mg) by mouth daily   Last time this was given:  40 mg on 8/17/2017  9:16 PM                                DULoxetine 20 MG EC capsule   Commonly known as:  CYMBALTA   Take 1 capsule (20 mg) by mouth 2 times daily   Last time this was given:  20 mg on 8/18/2017  8:49 AM                                furosemide 20 MG tablet   Commonly known as:  LASIX   TAKE HALF A TABLET BY MOUTH ONCE DAILY   Last time this was given:  10 mg on 8/18/2017  8:54 AM                                 lisinopril 20 MG tablet   Commonly known as:  PRINIVIL/ZESTRIL   Take 1 tablet (20 mg) by mouth 2 times daily   Last time this was given:  10 mg on 8/18/2017  8:50 AM                                loperamide 2 MG capsule   Commonly known as:  IMODIUM   Take 2 mg by mouth 4 times daily as needed for diarrhea                                Lysine 500 MG Tabs   Take 1 tablet by mouth daily as needed                                metoprolol 25 MG tablet   Commonly known as:  LOPRESSOR   Take 1 tablet (25 mg) by mouth 2 times daily   Last time this was given:  25 mg on 8/18/2017  8:49 AM                                MULTIVITAMIN ADULT Chew   Take 2 chew tab by mouth daily                                NITROGLYCERIN SL   Place 0.4 mg under the tongue                                PROBIOTIC DAILY PO   Take 1 capsule by mouth daily

## 2017-08-17 NOTE — ED PROVIDER NOTES
"  History     Chief Complaint   Patient presents with     Chest Pain     The history is provided by the patient and a relative.     Monik Santiago is a 77 year old female with a history of hyperlipidemia, anxiety disorder, GERD, hypertension, coronary artery disease, and myocardial infarction who presents to the ED complaining of chest pain. Patient lives with her daughter. Her daughter reports Monik has been complaining of \"feeling different\" intermittently for the past week. Patient reports she is complaining of shortness of breath, chest tightness, and heart palpitations. She notices that she becomes dizzy after taking a deep breath. Her daughter states she has been complaining having a hard time breathing at night as well as a cough and diaphoresis. Patient complains of feeling really weak, fatigued, tired, and unusually \"wanting to sleep all the time\". Patient denies blood in stool, history of anemia, abdominal pain, and history of cancer. Patient states she had a hernia repair about a year ago and she had her gallbladder removed 4-5 months ago. Patient sees Dr. Angel at the [facility] for cardiology. She has a follow up appointment with him on 8/30 but didn't want to wait that long so she decided to come into the ED.    I have reviewed the Medications, Allergies, Past Medical and Surgical History, and Social History in the Epic system.    Allergies:   Allergies   Allergen Reactions     Codeine Fatigue     Sulfa Drugs Hives     Trimethoprim Hives     Valium Fatigue         No current facility-administered medications on file prior to encounter.   Current Outpatient Prescriptions on File Prior to Encounter:  lisinopril (PRINIVIL/ZESTRIL) 10 MG tablet Take 1 tablet (10 mg) by mouth 2 times daily   furosemide (LASIX) 20 MG tablet TAKE HALF A TABLET BY MOUTH ONCE DAILY   traZODone (DESYREL) 50 MG tablet TAKE 1 TABLET (50 MG) BY MOUTH NIGHTLY AS NEEDED FOR SLEEP (Patient not taking: Reported on 8/8/2017)   DULoxetine " (CYMBALTA) 20 MG EC capsule Take 1 capsule (20 mg) by mouth 2 times daily   metoprolol (LOPRESSOR) 25 MG tablet Take 1 tablet (25 mg) by mouth 2 times daily   budesonide (ENTOCORT EC) 3 MG 24 hr capsule Take 3 mg by mouth every other day   atorvastatin (LIPITOR) 40 MG tablet Take 1 tablet (40 mg) by mouth daily   saccharomyces boulardii (FLORASTOR) 250 MG capsule Take 250 mg by mouth 2 times daily   NITROGLYCERIN SL Place 0.4 mg under the tongue   ASPIRIN PO Take 81 mg by mouth daily   ACETAMINOPHEN PO Take 500-1,000 mg by mouth every 8 hours as needed for pain   ascorbic acid (VITAMIN C) 250 MG CHEW Take 250 mg by mouth daily   Lysine 500 MG TABS Take 1 tablet by mouth daily   Multiple Vitamins-Minerals (MULTIVITAMIN OR) Take 1 tablet by mouth daily       Patient Active Problem List   Diagnosis     Other alteration of consciousness     Generalized anxiety disorder     GERD (gastroesophageal reflux disease)     Osteoarthritis     Hyperlipidemia LDL goal <70     Hypertension goal BP (blood pressure) < 140/90     Advanced directives, counseling/discussion     Acute myocardial infarction (H)     Hypoxia     NSTEMI (non-ST elevated myocardial infarction), history     Chest pain, atypical     ASCVD (arteriosclerotic cardiovascular disease)     CKD (chronic kidney disease) stage 3, GFR 30-59 ml/min     Acute worsening of stage 3 chronic kidney disease     Systolic CHF, chronic (H)     Anemia     DVT prophylaxis     Lichen sclerosus et atrophicus of the vulva     Near syncope     Coronary atherosclerosis of native coronary artery (VESSEL)     Abdominal pain, unspecified abdominal location     Health Care Home     Syncope     Pseudoseizure     Acidosis-metabolic     Nausea without vomiting     Recent repair of hiatal hernia 1/30/15     Urine retention - singh placed 2/4/15     Bilateral leg edema     Old myocardial infarction 2012     Lightheadedness     Lightheaded     Altered mental status     Headache     History of  C.diff colitis     Anxiety     History of MRI of brain and brain stem     RUQ abdominal pain     S/P laparoscopic cholecystectomy       Past Surgical History:   Procedure Laterality Date     C NONSPECIFIC PROCEDURE      abd hernia repair     C TOTAL KNEE ARTHROPLASTY  08/23/10    Right knee     Cardiac stents       COLONOSCOPY N/A 2/17/2016    Procedure: COMBINED COLONOSCOPY, SINGLE OR MULTIPLE BIOPSY/POLYPECTOMY BY BIOPSY;  Surgeon: Jose Gan MD;  Location: [facility]     ESOPHAGOSCOPY, GASTROSCOPY, DUODENOSCOPY (EGD), COMBINED N/A 12/17/2014    Procedure: COMBINED ESOPHAGOSCOPY, GASTROSCOPY, DUODENOSCOPY (EGD);  Surgeon: Kaz Mccoy MD;  Location: [facility]     HC REMV CATARACT EXTRACAP,INSERT LENS,COMP      darrian     HC UGI ENDOSCOPY DIAG W BIOPSY  12/16/09     HC YAG LASER CAPSULOTOMY  9/17/2009    Right eye     HEART CATH, ANGIOPLASTY  10/03/2012    Stent of LAD     HEART CATH, ANGIOPLASTY  05/17/2014     CAD, patent stent of LAD     LAPAROSCOPIC CHOLECYSTECTOMY N/A 12/3/2016    Procedure: LAPAROSCOPIC CHOLECYSTECTOMY;  Surgeon: Viral Meyers MD;  Location: [facility]     LAPAROSCOPIC HERNIORRHAPHY HIATAL N/A 1/30/2015    Procedure: LAPAROSCOPIC HERNIORRHAPHY HIATAL;  Surgeon: Chase Camara MD;  Location: [facility]     LAPAROSCOPIC NISSEN FUNDOPLICATION N/A 1/30/2015    Procedure: LAPAROSCOPIC NISSEN FUNDOPLICATION;  Surgeon: Chase Camara MD;  Location: [facility]     MOHS MICROGRAPHIC PROCEDURE  09/14/11    left upper forehead-09/14/11       Social History   Substance Use Topics     Smoking status: Never Smoker     Smokeless tobacco: Never Used     Alcohol use No       Most Recent Immunizations   Administered Date(s) Administered     Influenza (High Dose) 3 valent vaccine 11/08/2016     Influenza (IIV3) 10/04/2010     Pneumococcal 23 valent 10/21/2009     TDAP Vaccine (Boostrix) 05/16/2012       BMI: Estimated body mass index is 26.52 kg/(m^2) as calculated from the following:    Height as  "of 8/8/17: 1.6 m (5' 3\").    Weight as of this encounter: 67.9 kg (149 lb 11.2 oz).      Review of Systems   Constitutional: Positive for diaphoresis and fatigue (\"always wants to sleep\").   Respiratory: Positive for cough, chest tightness and shortness of breath.    Cardiovascular: Positive for palpitations.   Gastrointestinal: Negative for abdominal pain and blood in stool.   Neurological: Positive for dizziness (after deep breath) and weakness.   All other systems reviewed and are negative.      Physical Exam      Physical Exam   Constitutional: She is oriented to person, place, and time. She appears well-developed and well-nourished. No distress.   HENT:   Head: Normocephalic and atraumatic.   Right Ear: External ear normal.   Left Ear: External ear normal.   Eyes: Conjunctivae and EOM are normal.   Neck: Normal range of motion. Neck supple. No JVD present.   Cardiovascular: Normal rate, regular rhythm, normal heart sounds and intact distal pulses.  Exam reveals no friction rub.    No murmur heard.  Pulmonary/Chest: Effort normal and breath sounds normal. No respiratory distress. She has no wheezes. She has no rales. She exhibits no tenderness.   Abdominal: Soft. Bowel sounds are normal. There is no tenderness.   Musculoskeletal: She exhibits no edema or tenderness.   Neurological: She is alert and oriented to person, place, and time. No cranial nerve deficit.   Skin: Skin is warm and dry. No rash noted. No erythema.   Psychiatric: She has a normal mood and affect. Her behavior is normal. Judgment and thought content normal.   Nursing note and vitals reviewed.      ED Course  4:25 PM  Monik had an episode of diaphoresis while still here in the emergency department.  We did repeat an EKG, which was unchanged.  We repeated troponin, which was also negative.         ED Course     Procedures        1:23 PM   EKG interpreted by me.  Normal sinus rhythm.  Rate 67 bpm.  Normal axis.  No ST segment abnormalities.    4:25 " PM   EKG interpreted by me.  Normal sinus rhythm.  Rate 64.  Normal axis.  No ST segment abnormalities.  No change from EKG earlier at 1:23 PM today.      Results for orders placed or performed during the hospital encounter of 08/17/17 (from the past 24 hour(s))   CBC with platelets differential   Result Value Ref Range    WBC 5.6 4.0 - 11.0 10e9/L    RBC Count 4.16 3.8 - 5.2 10e12/L    Hemoglobin 12.1 11.7 - 15.7 g/dL    Hematocrit 39.1 35.0 - 47.0 %    MCV 94 78 - 100 fl    MCH 29.1 26.5 - 33.0 pg    MCHC 30.9 (L) 31.5 - 36.5 g/dL    RDW 15.0 10.0 - 15.0 %    Platelet Count 225 150 - 450 10e9/L    Diff Method Automated Method     % Neutrophils 52.3 %    % Lymphocytes 34.8 %    % Monocytes 9.7 %    % Eosinophils 2.3 %    % Basophils 0.9 %    % Immature Granulocytes 0.0 %    Absolute Neutrophil 2.9 1.6 - 8.3 10e9/L    Absolute Lymphocytes 1.9 0.8 - 5.3 10e9/L    Absolute Monocytes 0.5 0.0 - 1.3 10e9/L    Absolute Eosinophils 0.1 0.0 - 0.7 10e9/L    Absolute Basophils 0.1 0.0 - 0.2 10e9/L    Abs Immature Granulocytes 0.0 0 - 0.4 10e9/L   Troponin I   Result Value Ref Range    Troponin I ES <0.015 0.000 - 0.045 ug/L   Comprehensive metabolic panel   Result Value Ref Range    Sodium 145 (H) 133 - 144 mmol/L    Potassium 3.9 3.4 - 5.3 mmol/L    Chloride 108 94 - 109 mmol/L    Carbon Dioxide 31 20 - 32 mmol/L    Anion Gap 6 3 - 14 mmol/L    Glucose 93 70 - 99 mg/dL    Urea Nitrogen 23 7 - 30 mg/dL    Creatinine 0.90 0.52 - 1.04 mg/dL    GFR Estimate 61 >60 mL/min/1.7m2    GFR Estimate If Black 74 >60 mL/min/1.7m2    Calcium 8.9 8.5 - 10.1 mg/dL    Bilirubin Total 0.5 0.2 - 1.3 mg/dL    Albumin 3.8 3.4 - 5.0 g/dL    Protein Total 7.0 6.8 - 8.8 g/dL    Alkaline Phosphatase 95 40 - 150 U/L    ALT 15 0 - 50 U/L    AST 18 0 - 45 U/L   D dimer quantitative   Result Value Ref Range    D Dimer 0.5 0.0 - 0.50 ug/ml FEU   Nt probnp inpatient   Result Value Ref Range    N-Terminal Pro BNP Inpatient 477 0 - 1800 pg/mL   XR Chest 2  "Views    Narrative    CHEST TWO VIEWS August 17, 2017 1:50 PM     HISTORY: Shortness of breath/difficulty taking a deep breath.    COMPARISON: 12/3/2016.      Impression    IMPRESSION: No active disease.    LEON SANCHEZ MD   Troponin I   Result Value Ref Range    Troponin I ES <0.015 0.000 - 0.045 ug/L       Medications   sodium chloride (PF) 0.9% PF flush 3 mL (not administered)   aspirin chewable tablet 324 mg (324 mg Oral Not Given 8/17/17 1335)       Assessments & Plan (with Medical Decision Making)  Monik is a 77-year-old female with a history of coronary artery disease and MI who came to the ED complaining of chest pain.  She has been \"feeling different\" for about the past week and complains of shortness of breath, chest tightness and heart palpitations.  She had seen Dr. Angel recently and was given a Holter monitor.  She still has the monitor in place here in the emergency department.  On arrival her blood pressure was mildly elevated at 170/97 and her pulse is 62 bpm.  Exam shows normal breath sounds bilaterally and her heart sounds regular in rate and rhythm.  EKG shows a normal sinus rhythm.  Initial labs show normal troponin, normal CBC, normal CMP, normal d-dimer and a normal BNP.  Chest x-ray was unremarkable.  While still in the emergency department the patient continued to have chest discomfort.  She had an episode of diaphoresis at about 4:25 PM.  Repeat EKG was normal and her troponin was normal.  I have recommended to the patient and her daughter that she stay in the hospital [facility] as an observation patient for continued monitoring.  She has agreed with that plan.  I spoke with Dr. Roberts about this plan at 5:25 PM.  There will be troponin orders pending.  The patient is listed as full code.  She will be an observation stay.  I will write observation orders.       I have reviewed the nursing notes.    I have reviewed the findings, diagnosis, plan and need for follow up with the patient.       New " Prescriptions    No medications on file       Final diagnoses:   ASCVD (arteriosclerotic cardiovascular disease)   Old myocardial infarction 2012   Chest pain, unspecified type     This document serves as a record of services personally performed by Kaz Glaser MD. It was created on their behalf by Marek Hobbs, a trained medical scribe. The creation of this record is based on the provider's personal observations and the statements of the patient. This document has been checked and approved by the attending provider.    Note: Chart documentation done in part with Dragon Voice Recognition software. Although reviewed after completion, some word and grammatical errors may remain.    8/17/2017   [facility] EMERGENCY DEPARTMENT     Kaz Glaser MD  08/17/17 1726       Kaz Glaser MD  08/17/17 1727       Kaz Glaser MD  08/17/17 1733

## 2017-08-17 NOTE — IP AVS SNAPSHOT
[address] [facility]    [address] [facility]     [place] [address]    Phone:  [phone]                                       After Visit Summary   8/17/2017    Monik Santiago    MRN: [id number]           After Visit Summary Signature Page     I have received my discharge instructions, and my questions have been answered. I have discussed any challenges I see with this plan with the nurse or doctor.    ..........................................................................................................................................  Patient/Patient Representative Signature      ..........................................................................................................................................  Patient Representative Print Name and Relationship to Patient    ..................................................               ................................................  Date                                            Time    ..........................................................................................................................................  Reviewed by Signature/Title    ...................................................              ..............................................  Date                                                            Time

## 2017-08-17 NOTE — ED NOTES
ED Nursing criteria listed below was addressed during verbal handoff:     Abnormal vitals: Yes  Abnormal results: Yes  Med Reconciliation completed: Yes  Meds given in ED: Yes  Any Overdue Meds: N/A  Core Measures: N/A  Isolation: N/A  Special needs: Yes  Skin assessment: Yes    Observation Patient  Education provided: Yes

## 2017-08-17 NOTE — ED NOTES
Patient's daughter came down to the desk. Patient having sweats and not feeling well. Blood pressure not very much different from what is was earlier. Provider notified. Stat EKG and Troponin ordered.

## 2017-08-18 ENCOUNTER — TELEPHONE (OUTPATIENT)
Dept: FAMILY MEDICINE | Facility: CLINIC | Age: 77
End: 2017-08-18

## 2017-08-18 ENCOUNTER — APPOINTMENT (OUTPATIENT)
Dept: CARDIOLOGY | Facility: CLINIC | Age: 77
End: 2017-08-18
Attending: FAMILY MEDICINE
Payer: MEDICARE

## 2017-08-18 VITALS
HEIGHT: 63 IN | RESPIRATION RATE: 18 BRPM | HEART RATE: 66 BPM | WEIGHT: 146.61 LBS | OXYGEN SATURATION: 94 % | SYSTOLIC BLOOD PRESSURE: 163 MMHG | TEMPERATURE: 96.1 F | BODY MASS INDEX: 25.98 KG/M2 | DIASTOLIC BLOOD PRESSURE: 71 MMHG

## 2017-08-18 PROCEDURE — 25500064 ZZH RX 255 OP 636: Performed by: INTERNAL MEDICINE

## 2017-08-18 PROCEDURE — 93306 TTE W/DOPPLER COMPLETE: CPT | Mod: 26 | Performed by: INTERNAL MEDICINE

## 2017-08-18 PROCEDURE — 40000264 ECHO COMPLETE WITH OPTISON

## 2017-08-18 PROCEDURE — 25000132 ZZH RX MED GY IP 250 OP 250 PS 637: Mod: GY | Performed by: FAMILY MEDICINE

## 2017-08-18 PROCEDURE — A9270 NON-COVERED ITEM OR SERVICE: HCPCS | Mod: GY | Performed by: FAMILY MEDICINE

## 2017-08-18 PROCEDURE — G0378 HOSPITAL OBSERVATION PER HR: HCPCS

## 2017-08-18 PROCEDURE — 99217 ZZC OBSERVATION CARE DISCHARGE: CPT | Performed by: FAMILY MEDICINE

## 2017-08-18 RX ORDER — LISINOPRIL 20 MG/1
20 TABLET ORAL 2 TIMES DAILY
Qty: 60 TABLET | Refills: 0 | Status: SHIPPED | OUTPATIENT
Start: 2017-08-18 | End: 2017-10-06

## 2017-08-18 RX ORDER — METOPROLOL TARTRATE 25 MG/1
25 TABLET, FILM COATED ORAL 2 TIMES DAILY
Status: DISCONTINUED | OUTPATIENT
Start: 2017-08-18 | End: 2017-08-18 | Stop reason: HOSPADM

## 2017-08-18 RX ADMIN — LISINOPRIL 10 MG: 10 TABLET ORAL at 08:50

## 2017-08-18 RX ADMIN — DULOXETINE HYDROCHLORIDE 20 MG: 20 CAPSULE, DELAYED RELEASE ORAL at 08:49

## 2017-08-18 RX ADMIN — HUMAN ALBUMIN MICROSPHERES AND PERFLUTREN 3 ML: 10; .22 INJECTION, SOLUTION INTRAVENOUS at 12:58

## 2017-08-18 RX ADMIN — FUROSEMIDE 10 MG: 20 TABLET ORAL at 08:54

## 2017-08-18 RX ADMIN — METOPROLOL TARTRATE 25 MG: 25 TABLET ORAL at 08:49

## 2017-08-18 RX ADMIN — ASPIRIN 81 MG 81 MG: 81 TABLET ORAL at 08:49

## 2017-08-18 NOTE — PROGRESS NOTES
SPIRITUAL HEALTH SERVICES  SPIRITUAL ASSESSMENT Progress Note  [facility]      Pt declined a visit from .   is available for pt/family needs.    Edgardo Hernandez M.Div., [facility]  Staff   Office tel: [phone]

## 2017-08-18 NOTE — PROGRESS NOTES
S-(situation): shift note    B-(background): chest pain    A-(assessment): vitals stable, /54 down from 180's/80's. Pt denies any chest pain or shortness of breath. Telemetry showing Sinus bradycardia.     R-(recommendations): Follow up with cardiology.

## 2017-08-18 NOTE — PROGRESS NOTES
S-(situation): Patient discharged to home  with daughter    B-(background): Observation goals met     A-(assessment): /51, 163/71, HR 55-66, RA, afebrile lungs clear no c/o pain and or n/v. +BS. Trop WNL other test results WNL.      R-(recommendations): Discharge instructions reviewed with patient and spouse. Listed belongings gathered and returned to patient.  Patient Education resolved: Yes  New medications-Pt. Has been educated about reason of use and side effects NA  Home and hospital acquired medications returned to patient Yes  Medication Bin checked and emptied on discharge Yes

## 2017-08-18 NOTE — H&P
[facility]    History and Physical  Hospitalist       Date of Admission:  8/17/2017  Date of Service (when I saw the patient): 08/17/17    Assessment & Plan   Monik Santiago is a 77 year old female who presents with shortness of breath, diaphoresis, associated with chest tightness today. As known history of CAD with previous stent placement in 2012 in a normal clean angiogram in 2014. Evaluation in the ED is showing no acute changes on EKG with negative troponins. She has seen cardiology recently with complaints of chest tightness with possible palpitations. She currently has a Holter monitor on, due to be removed tomorrow. Patient will be registered to observation with serial troponin studies. She will monitored on telemetry and will need to speak to cardiology tomorrow for suggestions of next steps, whether the Holter explains her symptoms or whether she might need some type of stress testing.     Active Problems:    Chest pain, atypical    Assessment: assosciated with diaphoresis and shortness of breath with no changes on EKG in ED or troponin bump. Hx of of known CAD with stent placement in 2012, but with angiogram in 2014 showing no flow limiting lesions. Has Holter on for possible palpitations    Plan: as above, chest pain rule observation stay, will need to speak to cardiology tomorrow for next steps    Hypertension goal BP (blood pressure) < 140/90    Assessment: elevated on arrival to ED,  Better post clonidine    Plan: follow, continue home meds    ASCVD (arteriosclerotic cardiovascular disease)    Assessment: as above hx of stent    Plan: as above    Generalized anxiety disorder    Assessment: taking cymbalta and deseryl    Plan: no change    Hyperlipidemia LDL goal <70    Assessment: taking lipitor    Plan: continue  DVT Prophylaxis: Low Risk/Ambulatory with no VTE prophylaxis indicated  Code Status: Full Code    Disposition: Expected discharge in 1 days once testing neg  and cardiology consulted.    Octavio Roberts MD    Primary Care Physician   Ken Mar    Chief Complaint   77 year old female with chest tightness at home    History is obtained from the patient, electronic health record and emergency department physician    History of Present Illness   Monik Santiago is a 77 year old female who presents with chest tightness at home, occurring at rest, standing associated with shortness of breath and sweating. Has felt not normal for past several weeks with similar symptoms. Feeling more weak, sleeping more, at times dizzy. Denies cough, fever, chills, abdominal pain, nausea or change in stools. Was seen by cardiology with complaints of possible chest fluttering and is wearing a Holter monitor.  Has hx of NSTEMI in past with stent placement in 2012, had no flow limiting lesions on angiogram in 2014.     Past Medical History    I have reviewed this patient's medical history and updated it with pertinent information if needed.   Past Medical History:   Diagnosis Date     Clostridium difficile diarrhea      Coronary artery disease      Generalized anxiety disorder      GERD (gastroesophageal reflux disease)      History of MRI of brain and brain stem 4/10/2015    MR BRAIN FOR STROKE COMPLETE W/O & W CONTRAST 4/9/2015 9:34 PM MRI of the brain without and with contrast MRA of the head without contrast MRA of the neck without and with contrast History: eval for PRESS, Comparison: 3/19/2015,  Technique:  Brain: Axial diffusion-weighted with ADC map, T2-weighted with fat saturation, T1-weighted and turboFLAIR and coronal T1-weighted images of the brain were obt     Myocardial infarction (H)     NSTEMI     Other alteration of consciousness 7/2007    see neuro consult 7/25/2007. MinCep THOMSON was neg. Does not have seizures.      Other and unspecified hyperlipidemia      Syncope 10/19/2009    related to BP medications     Unspecified essential hypertension        Past Surgical History    I have reviewed this patient's surgical history and updated it with pertinent information if needed.  Past Surgical History:   Procedure Laterality Date     C NONSPECIFIC PROCEDURE      abd hernia repair     C TOTAL KNEE ARTHROPLASTY  08/23/10    Right knee     Cardiac stents       COLONOSCOPY N/A 2/17/2016    Procedure: COMBINED COLONOSCOPY, SINGLE OR MULTIPLE BIOPSY/POLYPECTOMY BY BIOPSY;  Surgeon: Jose Gan MD;  Location: [facility]     ESOPHAGOSCOPY, GASTROSCOPY, DUODENOSCOPY (EGD), COMBINED N/A 12/17/2014    Procedure: COMBINED ESOPHAGOSCOPY, GASTROSCOPY, DUODENOSCOPY (EGD);  Surgeon: Kaz Mccoy MD;  Location: [facility]     HC REMV CATARACT EXTRACAP,INSERT LENS,COMP      darrian     HC UGI ENDOSCOPY DIAG W BIOPSY  12/16/09     HC YAG LASER CAPSULOTOMY  9/17/2009    Right eye     HEART CATH, ANGIOPLASTY  10/03/2012    Stent of LAD     HEART CATH, ANGIOPLASTY  05/17/2014     CAD, patent stent of LAD     LAPAROSCOPIC CHOLECYSTECTOMY N/A 12/3/2016    Procedure: LAPAROSCOPIC CHOLECYSTECTOMY;  Surgeon: Viral Meyers MD;  Location: [facility]     LAPAROSCOPIC HERNIORRHAPHY HIATAL N/A 1/30/2015    Procedure: LAPAROSCOPIC HERNIORRHAPHY HIATAL;  Surgeon: Chase Camara MD;  Location: [facility]     LAPAROSCOPIC NISSEN FUNDOPLICATION N/A 1/30/2015    Procedure: LAPAROSCOPIC NISSEN FUNDOPLICATION;  Surgeon: Chase Camara MD;  Location: [facility]     MOHS MICROGRAPHIC PROCEDURE  09/14/11    left upper forehead-09/14/11       Prior to Admission Medications   Prior to Admission Medications   Prescriptions Last Dose Informant Patient Reported? Taking?   ACETAMINOPHEN PO Past Week at Unknown time  Yes Yes   Sig: Take 500-1,000 mg by mouth every 8 hours as needed for pain   ASPIRIN PO 8/17/2017 at 0930  Yes Yes   Sig: Take 81 mg by mouth daily   DULoxetine (CYMBALTA) 20 MG EC capsule 8/17/2017 at 0930  No Yes   Sig: Take 1 capsule (20 mg) by mouth 2 times daily   Lysine 500 MG TABS More than a month at Unknown  time Self Yes No   Sig: Take 1 tablet by mouth daily as needed    Multiple Vitamins-Minerals (MULTIVITAMIN ADULT) CHEW Past Week at Unknown time Self Yes Yes   Sig: Take 2 chew tab by mouth daily   NITROGLYCERIN SL More than a month at Unknown time  Yes No   Sig: Place 0.4 mg under the tongue   Probiotic Product (PROBIOTIC DAILY PO) 8/17/2017 at 0930 Daughter Yes Yes   Sig: Take 1 capsule by mouth daily   atorvastatin (LIPITOR) 40 MG tablet 8/16/2017 at 2130  No Yes   Sig: Take 1 tablet (40 mg) by mouth daily   furosemide (LASIX) 20 MG tablet 8/17/2017 at 0930  No Yes   Sig: TAKE HALF A TABLET BY MOUTH ONCE DAILY   lisinopril (PRINIVIL/ZESTRIL) 10 MG tablet 8/17/2017 at 0930  No Yes   Sig: Take 1 tablet (10 mg) by mouth 2 times daily   loperamide (IMODIUM) 2 MG capsule 8/16/2017 at 0930 Self Yes Yes   Sig: Take 2 mg by mouth 4 times daily as needed for diarrhea   metoprolol (LOPRESSOR) 25 MG tablet 8/17/2017 at 0930  No Yes   Sig: Take 1 tablet (25 mg) by mouth 2 times daily      Facility-Administered Medications: None     Allergies   Allergies   Allergen Reactions     Codeine Fatigue     Latex      Lidocaine Other (See Comments)     was one of 3 drugs given during GA that caused difficulty with anesthesia reversal     Sulfa Drugs Hives     Trimethoprim Hives     Valium Fatigue       Social History   I have reviewed this patient's social history and updated it with pertinent information if needed. Monik Padronyer  reports that she has never smoked. She has never used smokeless tobacco. She reports that she does not drink alcohol or use illicit drugs.    Family History   I have reviewed this patient's family history and updated it with pertinent information if needed.   Family History   Problem Relation Age of Onset     DIABETES No family hx of      Cardiovascular Brother      Cardiovascular Father      Cardiovascular Mother      Hypertension Mother      Lipids Mother      Cardiovascular Sister      stents x 2-3      Hypertension Sister      Cardiovascular Sister      MI 64       Review of Systems   The 10 point Review of Systems is negative other than noted in the HPI or here.     Physical Exam   Temp: 97.8  F (36.6  C) Temp src: Oral BP: 158/74 Pulse: 64 Heart Rate: 64 Resp: 16 SpO2: 98 % O2 Device: None (Room air)    Vital Signs with Ranges  Temp:  [97.8  F (36.6  C)-98.4  F (36.9  C)] 97.8  F (36.6  C)  Pulse:  [59-64] 64  Heart Rate:  [57-67] 64  Resp:  [0-38] 16  BP: (158-200)/(68-97) 158/74  SpO2:  [95 %-98 %] 98 %  146 lbs 9.69 oz    EXAM:  Constitutional: healthy, alert and no distress   Cardiovascular: PMI normal. No lifts, heaves, or thrills. RRR. No murmurs, clicks gallops or rub  Respiratory: Percussion normal. Good diaphragmatic excursion. Lungs clear  Psychiatric: mentation appears normal and affect normal/bright  Head: Normocephalic. No masses, lesions, tenderness or abnormalities  Neck: Neck supple. No adenopathy. Thyroid symmetric, normal size,  ENT: ENT exam normal, no neck nodes or sinus tenderness  : Deferred  NEURO: Gait normal. Reflexes normal and symmetric. Sensation grossly WNL.  SKIN: no suspicious lesions or rashes  LYMPH: Normal cervical lymph nodes  JOINT/EXTREMITIES: extremities normal- no gross deformities noted and normal muscle tone     Data   Data reviewed today:  I personally reviewed the EKG tracing showing normal sinus rhythm, no changes, chest x-ray was normal.    Recent Labs  Lab 08/17/17  1640 08/17/17  1329   WBC  --  5.6   HGB  --  12.1   MCV  --  94   PLT  --  225   NA  --  145*   POTASSIUM  --  3.9   CHLORIDE  --  108   CO2  --  31   BUN  --  23   CR  --  0.90   ANIONGAP  --  6   MONSERRAT  --  8.9   GLC  --  93   ALBUMIN  --  3.8   PROTTOTAL  --  7.0   BILITOTAL  --  0.5   ALKPHOS  --  95   ALT  --  15   AST  --  18   TROPI <0.015 <0.015       Recent Results (from the past 24 hour(s))   XR Chest 2 Views    Narrative    CHEST TWO VIEWS August 17, 2017 1:50 PM     HISTORY: Shortness of  breath/difficulty taking a deep breath.    COMPARISON: 12/3/2016.      Impression    IMPRESSION: No active disease.    LEON SANCHEZ MD

## 2017-08-18 NOTE — PROGRESS NOTES
Verified with the cardiology department that patient is supposed to have event/holter monitor discontinued today.  She is supposed to discontinue it, then should put it in the provided  and drop it in the mailbox to be returned to the company.  She received a  box when the monitor was placed.  It is a pre-paid return .

## 2017-08-18 NOTE — TELEPHONE ENCOUNTER
Reason for Call:  Same Day Appointment, Requested Provider:  Ken Mar MD    PCP: Ken Mar    Reason for visit: post hospital check/ heart    Duration of symptoms:     Have you been treated for this in the past? Yes    Additional comments: Was told to see you for a hospital follow up within five days. Informed out of clinic until 8/21.     Can we leave a detailed message on this number? YES    Phone number patient can be reached at: Home number on file [phone] (home)    Best Time: any     Call taken on 8/18/2017 at 2:38 PM by Anahy Briceño

## 2017-08-18 NOTE — PROGRESS NOTES
S-(situation): Patient registered to Observation. Patient arrived to room 253 from ER    B-(background): C/O shortness of breath with cough    A-(assessment): /74, HR 59 T 97.8, 98% RA no c/o pain and or n/v skin intact; tele; SB.     R-(recommendations): Orders and observation goals reviewed with Patient and daughter    Nursing Observation criteria listed below was met:    Skin issues/needs documented:Yes  Isolation needs addressed, if appropriate: NA  Fall Prevention: Education given and documented: Yes  Education Assessment documented:Yes  Education Documented (Pre-existing chronic infection such as, MRSA/VRE need education on admission): Yes  New medication patient education completed and documented (Possible Side Effects of Common Medications handout): Yes  Home medications if not able to send immediately home with family stored here: NA  Reminder note placed in discharge instructions: NA  Patient has discharge needs (If yes, please explain): No

## 2017-08-18 NOTE — DISCHARGE SUMMARY
[facility] Discharge Summary    Monik Santiago MRN# [id number]   Age: 77 year old YOB: 1940     Date of Admission:  8/17/2017  Date of Discharge::  8/18/2017  Admitting Physician:  Octavio Roberts MD  Discharge Physician:  Niru Hobbs MD    Home clinic: [facility]          Admission Diagnoses:   Old myocardial infarction [I25.2]  ASCVD (arteriosclerotic cardiovascular disease) [I25.10]  Chest pain, unspecified type [R07.9]          Discharge Diagnosis:   Active Problems:    Chest pain, atypical    Assessment: Patient had an episode of chest tightness associated with shortness of breath and diaphoresis of atypical presentation. Cardiac workup has been unremarkable for acute heart attack and echocardiogram does not show any wall motion abnormality    Plan:  Patient will discharge home and complete her Holter monitor as previously scheduled. She will continue with metoprolol 25 mg b.i.d. - did discuss increasing this is secondary to bradycardia in the 50s will be held on lower dose at time of discharge. Patient does have follow-up appointment set up with Dr. Angel in 2 weeks to review Holter monitor results as well as other outcomes obtained during this hospitalization      ASCVD (arteriosclerotic cardiovascular disease)    Assessment:  Patient has known CAD with a PCI placed in the LAD in 2012 that an angiogram in 2014 showing nonflow limiting CAD.  EKG has been unremarkable and troponins have been negative serially. Echocardiogram has been performed and there is no wall motion abnormality.     Plan:  Follow up with cardiology as an outpatient as above.      Hypertension goal BP (blood pressure) < 140/90    Assessment:  Patient's blood pressures had been elevated throughout his observation stay. Initially were significantly elevated in the 180-200 range, however home regimen was reinitiated and blood pressure has decreased to the 140s to 160s systolic    Plan:  Patient will be discharged on home regimen of metoprolol and lasix however lisinopril will be increased to 20 mg b.i.d. and patient will start monitoring her blood pressure 1-2 times a day in the home environment and follow-up with her primary care provider for further titration as needed. Did discuss with patient and her daughter that given patient's age, blood pressure goal of less than 160 systolic may be realistic while avoiding significant side effect from medication.      Generalized anxiety disorder    Assessment: Ongoing but stable, and does appear to impact patient's perception of symptoms    Plan: Discharge about changed home regimen      Hyperlipidemia LDL goal <70    Assessment:  On atorvastatin    Plan:  Discharge without change          Procedures:   No procedures performed during this admission          Medications Prior to Admission:     Prescriptions Prior to Admission   Medication Sig Dispense Refill Last Dose     Multiple Vitamins-Minerals (MULTIVITAMIN ADULT) CHEW Take 2 chew tab by mouth daily   Past Week at Unknown time     loperamide (IMODIUM) 2 MG capsule Take 2 mg by mouth 4 times daily as needed for diarrhea   8/16/2017 at 0930     Probiotic Product (PROBIOTIC DAILY PO) Take 1 capsule by mouth daily   8/17/2017 at 0930     furosemide (LASIX) 20 MG tablet TAKE HALF A TABLET BY MOUTH ONCE DAILY 45 tablet 2 8/17/2017 at 0930     DULoxetine (CYMBALTA) 20 MG EC capsule Take 1 capsule (20 mg) by mouth 2 times daily 180 capsule 3 8/17/2017 at 0930     metoprolol (LOPRESSOR) 25 MG tablet Take 1 tablet (25 mg) by mouth 2 times daily 180 tablet 3 8/17/2017 at 0930     atorvastatin (LIPITOR) 40 MG tablet Take 1 tablet (40 mg) by mouth daily 90 tablet 3 8/16/2017 at 2130     ASPIRIN PO Take 81 mg by mouth daily   8/17/2017 at 0930     ACETAMINOPHEN PO Take 500-1,000 mg by mouth every 8 hours as needed for pain   Past Week at Unknown time     NITROGLYCERIN SL Place 0.4 mg under the tongue   More than a  month at Unknown time     Lysine 500 MG TABS Take 1 tablet by mouth daily as needed    More than a month at Unknown time             Discharge Medications:     Current Discharge Medication List      CONTINUE these medications which have CHANGED    Details   lisinopril (PRINIVIL/ZESTRIL) 20 MG tablet Take 1 tablet (20 mg) by mouth 2 times daily  Qty: 60 tablet, Refills: 0    Associated Diagnoses: Essential hypertension with goal blood pressure less than 140/90         CONTINUE these medications which have NOT CHANGED    Details   Multiple Vitamins-Minerals (MULTIVITAMIN ADULT) CHEW Take 2 chew tab by mouth daily      loperamide (IMODIUM) 2 MG capsule Take 2 mg by mouth 4 times daily as needed for diarrhea      Probiotic Product (PROBIOTIC DAILY PO) Take 1 capsule by mouth daily      furosemide (LASIX) 20 MG tablet TAKE HALF A TABLET BY MOUTH ONCE DAILY  Qty: 45 tablet, Refills: 2    Associated Diagnoses: Swelling of lower limb      DULoxetine (CYMBALTA) 20 MG EC capsule Take 1 capsule (20 mg) by mouth 2 times daily  Qty: 180 capsule, Refills: 3    Associated Diagnoses: Adjustment disorder with mixed anxiety and depressed mood      metoprolol (LOPRESSOR) 25 MG tablet Take 1 tablet (25 mg) by mouth 2 times daily  Qty: 180 tablet, Refills: 3    Associated Diagnoses: Essential hypertension with goal blood pressure less than 140/90      atorvastatin (LIPITOR) 40 MG tablet Take 1 tablet (40 mg) by mouth daily  Qty: 90 tablet, Refills: 3    Associated Diagnoses: ASCVD (arteriosclerotic cardiovascular disease); Hyperlipidemia LDL goal <70      ASPIRIN PO Take 81 mg by mouth daily      ACETAMINOPHEN PO Take 500-1,000 mg by mouth every 8 hours as needed for pain      NITROGLYCERIN SL Place 0.4 mg under the tongue      Lysine 500 MG TABS Take 1 tablet by mouth daily as needed                    Consultations:   Telephone consultation during this admission received from Dr. Gibson, cardiologist on call for [facility]  Hospital, regarding patient's symptoms and work up performed during this stay as well as follow up recommended.          Brief History of Illness:   Patient is a 77-year-old female who presented to the emergency room with an episode of shortness of breath, diaphoresis, and chest tightness as well as ongoing intermittent dizziness. She has a known history of coronary artery disease with a PCI placement 2012 but angiogram in 2014 showing nonflow limiting CAD. Patient had been seen by her cardiologist earlier this month at that time was having episodes of dizziness and heart fluttering with associated chest discomfort. She does have a Holter monitor currently in place. In the last week she had been having increased chest discomfort symptoms and presented with a prolonged episode. Initial workup in the ED showed unchanged EKG and negative initial troponin. Decision was made to observe patient for ongoing cardiac evaluation          Hospital Course:   During the patient's observation stay she had no chest discomfort or other symptoms that brought her in. Her serial troponins were all negative. Cardiology was consulted and proceeded with echocardiogram that showed no wall motion abnormality. Patient will be discharged home with Holter monitor still in place, to be completed as previously arranged. Patient will have follow-up with cardiology as an outpatient within 2 weeks         Physical Exam:   Vitals were reviewed  Constitutional:   awake, alert, cooperative, no apparent distress, and appears stated age     Lungs:   No increased work of breathing, good air exchange, clear to auscultation bilaterally, no crackles or wheezing     Cardiovascular:   Normal apical impulse, regular rate and rhythm, normal S1 and S2, no S3 or S4, and no murmur noted     Abdomen:   Bowel sounds present, abdomen soft and non-tender without masses     Musculoskeletal:   no notable lower extremity pitting edema present     Neurologic:   Awake,  alert, oriented to name, place and time.  Patient does become easily confused when discussing medications and results, but daughter is very supportive and helps with interpretation     Skin:   normal skin color, texture, turgor             Discharge Instructions and Follow-Up:   Discharge diet: Regular   Discharge activity: Activity as tolerated   Discharge follow-up: Follow up with primary care provider within 7 days  Follow up with Dr. Angel, cardiology, in 2-3 weeks           Discharge Disposition:   Discharged to home      Attestation:  I have reviewed today's vital signs, notes, medications, labs and imaging.    More than 30 minutes was spent on this discharge.      Niru Hobbs MD    Note: Chart documentation done in part with Dragon Voice Recognition software. Although reviewed after completion, some word and grammatical errors may remain.

## 2017-08-22 ENCOUNTER — OFFICE VISIT (OUTPATIENT)
Dept: INTERNAL MEDICINE | Facility: CLINIC | Age: 77
End: 2017-08-22
Payer: MEDICARE

## 2017-08-22 VITALS
RESPIRATION RATE: 16 BRPM | SYSTOLIC BLOOD PRESSURE: 182 MMHG | WEIGHT: 146 LBS | OXYGEN SATURATION: 94 % | HEART RATE: 70 BPM | BODY MASS INDEX: 25.86 KG/M2 | DIASTOLIC BLOOD PRESSURE: 84 MMHG | TEMPERATURE: 98.1 F

## 2017-08-22 DIAGNOSIS — I10 HYPERTENSION GOAL BP (BLOOD PRESSURE) < 140/90: ICD-10-CM

## 2017-08-22 DIAGNOSIS — R07.9 CHEST PAIN ON EXERTION: Primary | ICD-10-CM

## 2017-08-22 DIAGNOSIS — R10.9 FLANK PAIN: ICD-10-CM

## 2017-08-22 LAB
ALBUMIN UR-MCNC: NEGATIVE MG/DL
APPEARANCE UR: CLEAR
BILIRUB UR QL STRIP: NEGATIVE
COLOR UR AUTO: YELLOW
GLUCOSE UR STRIP-MCNC: NEGATIVE MG/DL
HGB UR QL STRIP: NEGATIVE
KETONES UR STRIP-MCNC: NEGATIVE MG/DL
LEUKOCYTE ESTERASE UR QL STRIP: NEGATIVE
NITRATE UR QL: NEGATIVE
PH UR STRIP: 6 PH (ref 5–7)
SOURCE: NORMAL
SP GR UR STRIP: 1.01 (ref 1–1.03)
UROBILINOGEN UR STRIP-MCNC: 0 MG/DL (ref 0–2)

## 2017-08-22 PROCEDURE — 99495 TRANSJ CARE MGMT MOD F2F 14D: CPT | Performed by: INTERNAL MEDICINE

## 2017-08-22 PROCEDURE — 81003 URINALYSIS AUTO W/O SCOPE: CPT | Performed by: INTERNAL MEDICINE

## 2017-08-22 RX ORDER — AMLODIPINE BESYLATE 5 MG/1
5 TABLET ORAL DAILY
Qty: 30 TABLET | Refills: 3 | Status: SHIPPED | OUTPATIENT
Start: 2017-08-22 | End: 2017-11-17

## 2017-08-22 ASSESSMENT — PAIN SCALES - GENERAL: PAINLEVEL: MODERATE PAIN (4)

## 2017-08-22 NOTE — PROGRESS NOTES
SUBJECTIVE:   Monik Santiago is a 77 year old female who presents to clinic today for the following health issues:    Hospital Follow-up Visit:    Hospital/Nursing Home/IP Rehab Facility: [facility]  Date of Admission: 8/17/17  Date of Discharge: 8/18/17  Reason(s) for Admission: Old MI, ASCVD and Chest pain            Problems taking medications regularly:  None       Medication changes since discharge: None       Problems adhering to non-medication therapy:  None    Summary of hospitalization:  [facility] discharge summary reviewed  Diagnostic Tests/Treatments reviewed.  Follow up needed: none  Other Healthcare Providers Involved in Patient s Care:         None  Update since discharge: improved.     Post Discharge Medication Reconciliation: discharge medications reconciled and changed, per note/orders (see AVS).  Plan of care communicated with patient and family        Sweating and hard time climbing the stairs. Gets sob and was observed in the hospital.  Echo was ok in the hospital.      Blood pressure has been up despite increased medications, at home is 170-190 range, once in the 150 range.      No chest pain or sob when just sitting.      Past Medical History:   Diagnosis Date     Clostridium difficile diarrhea      Coronary artery disease      Generalized anxiety disorder      GERD (gastroesophageal reflux disease)      History of MRI of brain and brain stem 4/10/2015    MR BRAIN FOR STROKE COMPLETE W/O & W CONTRAST 4/9/2015 9:34 PM MRI of the brain without and with contrast MRA of the head without contrast MRA of the neck without and with contrast History: deniz for PRESS, Comparison: 3/19/2015,  Technique:  Brain: Axial diffusion-weighted with ADC map, T2-weighted with fat saturation, T1-weighted and turboFLAIR and coronal T1-weighted images of the brain were obt     Myocardial infarction (H)     NSTEMI     Other alteration of consciousness 7/2007    see neuro consult 7/25/2007.  Rena THOMSON was neg. Does not have seizures.      Other and unspecified hyperlipidemia      Syncope 10/19/2009    related to BP medications     Unspecified essential hypertension      Current Outpatient Prescriptions   Medication     amLODIPine (NORVASC) 5 MG tablet     lisinopril (PRINIVIL/ZESTRIL) 20 MG tablet     Multiple Vitamins-Minerals (MULTIVITAMIN ADULT) CHEW     loperamide (IMODIUM) 2 MG capsule     Probiotic Product (PROBIOTIC DAILY PO)     furosemide (LASIX) 20 MG tablet     DULoxetine (CYMBALTA) 20 MG EC capsule     metoprolol (LOPRESSOR) 25 MG tablet     atorvastatin (LIPITOR) 40 MG tablet     ASPIRIN PO     ACETAMINOPHEN PO     Lysine 500 MG TABS     NITROGLYCERIN SL     No current facility-administered medications for this visit.      Social History   Substance Use Topics     Smoking status: Never Smoker     Smokeless tobacco: Never Used     Alcohol use No     Review of Systems  Constitutional-No fevers, chills, or weight changes..  Cardiac-Exertional SOB.  Respiratory-No cough, sob, or hemoptysis.  GI-No nausea, vomitting, diarrhea, constipation, or blood in the stool.  Musculoskeletal-No muscles aches or joint pains.    Physical Exam  /84  Pulse 70  Temp 98.1  F (36.7  C) (Temporal)  Resp 16  Wt 146 lb (66.2 kg)  SpO2 94%  BMI 25.86 kg/m2  General Appearance-healthy, alert, no distress  Cardiac-regular rate and rhythm  with normal S1, S2 ; no murmur, rub or gallops  Lungs-clear to auscultation  Extremities-slight swelling in the left ankle, no swelling in the right lower leg    ASSESSMENT:  Patient has a history of coronary disease, she had a stent in 2012, and negative angiogram in 2014. She is having more chest pain on exertion, and shortness of breath. She's developed some elevated hypertension despite her lisinopril being up to 20 twice a day. I will increase her Lasix from 10 mg to 20 mg, increase or add amlodipine at 5 mg a day for blood pressure. There is a possibility that this is  arrhythmia related and she has been wearing a Holter monitor and we are waiting for the results. Otherwise I would think she'll need a repeat angiogram or at least a stress test. She has seen her cardiologist on September 5. We will try to treat her blood pressure to see if this helps her symptoms. Otherwise follow-up with the cardiologist as scheduled.    Patient is having some low back, flank pain we will check urinalysis today.      Electronically signed by Ken Mar MD

## 2017-08-22 NOTE — NURSING NOTE
"Chief Complaint   Patient presents with     Hospital F/U     Flank Pain       Initial /84  Pulse 70  Temp 98.1  F (36.7  C) (Temporal)  Resp 16  Wt 146 lb (66.2 kg)  SpO2 94%  BMI 25.86 kg/m2 Estimated body mass index is 25.86 kg/(m^2) as calculated from the following:    Height as of 8/17/17: 5' 3\" (1.6 m).    Weight as of this encounter: 146 lb (66.2 kg).  Medication Reconciliation: complete    "

## 2017-08-22 NOTE — MR AVS SNAPSHOT
After Visit Summary   8/22/2017    Monik Santiago    MRN: [id number]           Patient Information     Date Of Birth          1940        Visit Information        Provider Department      8/22/2017 8:45 AM Ken Mar MD [facility]         Follow-ups after your visit        Your next 10 appointments already scheduled     Aug 30, 2017 11:00 AM CDT   Office Visit with Ken Mar MD   [facility] ([address] [phone]           Bring a current list of meds and any records pertaining to this visit. For Physicals, please bring immunization records and any forms needing to be filled out. Please arrive 10 minutes early to complete paperwork.            Sep 05, 2017 10:30 AM CDT   Return Visit with Yung Angel MD   [facility] ([address] [phone]              Who to contact     If you have questions or need follow up information about today's clinic visit or your schedule please contact [facility] directly at [phone].  Normal or non-critical lab and imaging results will be communicated to you by MyChart, letter or phone within 4 business days after the clinic has received the results. If you do not hear from us within 7 days, please contact the clinic through [employer] or phone. If you have a critical or abnormal lab result, we will notify you by phone as soon as possible.  Submit refill requests through [employer] or call your pharmacy and they will forward the refill request to us. Please allow 3 business days for your refill to be completed.          Additional Information About Your Visit        MyChart Information     [employer] lets you send messages to your doctor, view your test results, renew your prescriptions, schedule appointments and more. To sign up, go to [email or web address] .  "Click on \"Log in\" on the left side of the screen, which will take you to the Welcome page. Then click on \"Sign up Now\" on the right side of the page.     You will be asked to enter the access code listed below, as well as some personal information. Please follow the directions to create your username and password.     Your access code is: [email or web address]  Expires: 2017  3:45 PM     Your access code will  in 90 days. If you need help or a new code, please call your [place] clinic or [phone].        Care EveryWhere ID     This is your Care EveryWhere ID. This could be used by other organizations to access your [facility] medical records  [id number]        Your Vitals Were     Pulse Temperature Respirations Pulse Oximetry BMI (Body Mass Index)       70 98.1  F (36.7  C) (Temporal) 16 94% 25.86 kg/m2        Blood Pressure from Last 3 Encounters:   17 182/84   17 163/71   17 166/86    Weight from Last 3 Encounters:   17 146 lb (66.2 kg)   17 146 lb 9.7 oz (66.5 kg)   17 146 lb 9.6 oz (66.5 kg)              Today, you had the following     No orders found for display       Primary Care Provider Office Phone # Fax #    Ken Mar [phone]       [facility] [address] [place] [address]        Equal Access to Services     DANIEL SMITH : Hadii aad ku hadasho Soomaali, waaxda luqadaha, qaybta kaalmada adeegyada, waxay kathy pickett . So [facility] [phone].    ATENCIÓN: Si habla español, tiene a hamm disposición servicios gratuitos de asistencia lingüística. Llame al [phone].    We comply with applicable federal civil rights laws and [place] laws. We do not discriminate on the basis of race, color, national origin, age, disability sex, sexual orientation or gender identity.            Thank you!     Thank you for choosing [facility]  for your care. Our goal is always to provide you with " excellent care. Hearing back from our patients is one way we can continue to improve our services. Please take a few minutes to complete the written survey that you may receive in the mail after your visit with us. Thank you!             Your Updated Medication List - Protect others around you: Learn how to safely use, store and throw away your medicines at www.disposemymeds.org.          This list is accurate as of: 8/22/17  8:59 AM.  Always use your most recent med list.                   Brand Name Dispense Instructions for use Diagnosis    ACETAMINOPHEN PO      Take 500-1,000 mg by mouth every 8 hours as needed for pain        ASPIRIN PO      Take 81 mg by mouth daily        atorvastatin 40 MG tablet    LIPITOR    90 tablet    Take 1 tablet (40 mg) by mouth daily    ASCVD (arteriosclerotic cardiovascular disease), Hyperlipidemia LDL goal <70       DULoxetine 20 MG EC capsule    CYMBALTA    180 capsule    Take 1 capsule (20 mg) by mouth 2 times daily    Adjustment disorder with mixed anxiety and depressed mood       furosemide 20 MG tablet    LASIX    45 tablet    TAKE HALF A TABLET BY MOUTH ONCE DAILY    Swelling of lower limb       lisinopril 20 MG tablet    PRINIVIL/ZESTRIL    60 tablet    Take 1 tablet (20 mg) by mouth 2 times daily    Essential hypertension with goal blood pressure less than 140/90       loperamide 2 MG capsule    IMODIUM     Take 2 mg by mouth 4 times daily as needed for diarrhea        Lysine 500 MG Tabs      Take 1 tablet by mouth daily as needed        metoprolol 25 MG tablet    LOPRESSOR    180 tablet    Take 1 tablet (25 mg) by mouth 2 times daily    Essential hypertension with goal blood pressure less than 140/90       MULTIVITAMIN ADULT Chew      Take 2 chew tab by mouth daily        NITROGLYCERIN SL      Place 0.4 mg under the tongue        PROBIOTIC DAILY PO      Take 1 capsule by mouth daily

## 2017-08-29 ENCOUNTER — TELEPHONE (OUTPATIENT)
Dept: INTERNAL MEDICINE | Facility: CLINIC | Age: 77
End: 2017-08-29

## 2017-08-29 NOTE — TELEPHONE ENCOUNTER
Reason for Call:  Other appointment    Detailed comments: patients daughter is calling and wondering if the appointment for tomorrow is needed or if it can be cancelled? Please advise     Phone Number Patient can be reached at: Home number on file [phone] (home)    Best Time: any    Can we leave a detailed message on this number? YES    Call taken on 8/29/2017 at 1:15 PM by Yaneli Snow

## 2017-09-05 ENCOUNTER — OFFICE VISIT (OUTPATIENT)
Dept: CARDIOLOGY | Facility: CLINIC | Age: 77
End: 2017-09-05
Payer: MEDICARE

## 2017-09-05 VITALS
HEART RATE: 62 BPM | BODY MASS INDEX: 26.05 KG/M2 | DIASTOLIC BLOOD PRESSURE: 78 MMHG | SYSTOLIC BLOOD PRESSURE: 126 MMHG | WEIGHT: 147 LBS | HEIGHT: 63 IN | OXYGEN SATURATION: 96 %

## 2017-09-05 DIAGNOSIS — R07.89 ATYPICAL CHEST PAIN: Primary | ICD-10-CM

## 2017-09-05 PROCEDURE — 99214 OFFICE O/P EST MOD 30 MIN: CPT | Performed by: INTERNAL MEDICINE

## 2017-09-05 NOTE — PROGRESS NOTES
HPI and Plan:   See dictation(#[id number])    Orders Placed This Encounter   Procedures     NM Exercise stress test (nuc card)     Follow-Up with Cardiac Advanced Practice Provider       No orders of the defined types were placed in this encounter.      There are no discontinued medications.      Encounter Diagnosis   Name Primary?     Atypical chest pain Yes       CURRENT MEDICATIONS:  Current Outpatient Prescriptions   Medication Sig Dispense Refill     amLODIPine (NORVASC) 5 MG tablet Take 1 tablet (5 mg) by mouth daily 30 tablet 3     lisinopril (PRINIVIL/ZESTRIL) 20 MG tablet Take 1 tablet (20 mg) by mouth 2 times daily 60 tablet 0     Multiple Vitamins-Minerals (MULTIVITAMIN ADULT) CHEW Take 2 chew tab by mouth daily       loperamide (IMODIUM) 2 MG capsule Take 2 mg by mouth 4 times daily as needed for diarrhea       Probiotic Product (PROBIOTIC DAILY PO) Take 1 capsule by mouth daily       furosemide (LASIX) 20 MG tablet TAKE HALF A TABLET BY MOUTH ONCE DAILY 45 tablet 2     DULoxetine (CYMBALTA) 20 MG EC capsule Take 1 capsule (20 mg) by mouth 2 times daily 180 capsule 3     metoprolol (LOPRESSOR) 25 MG tablet Take 1 tablet (25 mg) by mouth 2 times daily 180 tablet 3     atorvastatin (LIPITOR) 40 MG tablet Take 1 tablet (40 mg) by mouth daily 90 tablet 3     ASPIRIN PO Take 81 mg by mouth daily       ACETAMINOPHEN PO Take 500-1,000 mg by mouth every 8 hours as needed for pain       Lysine 500 MG TABS Take 1 tablet by mouth daily as needed        NITROGLYCERIN SL Place 0.4 mg under the tongue         ALLERGIES     Allergies   Allergen Reactions     Codeine Fatigue     Latex      Lidocaine Other (See Comments)     was one of 3 drugs given during GA that caused difficulty with anesthesia reversal     Sulfa Drugs Hives     Trimethoprim Hives     Valium Fatigue       PAST MEDICAL HISTORY:  Past Medical History:   Diagnosis Date     Clostridium difficile diarrhea      Coronary artery disease      Generalized anxiety  disorder      GERD (gastroesophageal reflux disease)      History of MRI of brain and brain stem 4/10/2015    MR BRAIN FOR STROKE COMPLETE W/O & W CONTRAST 4/9/2015 9:34 PM MRI of the brain without and with contrast MRA of the head without contrast MRA of the neck without and with contrast History: eval for PRESS, Comparison: 3/19/2015,  Technique:  Brain: Axial diffusion-weighted with ADC map, T2-weighted with fat saturation, T1-weighted and turboFLAIR and coronal T1-weighted images of the brain were obt     Myocardial infarction (H)     NSTEMI     Other alteration of consciousness 7/2007    see neuro consult 7/25/2007. MinCep THOMSON was neg. Does not have seizures.      Other and unspecified hyperlipidemia      Syncope 10/19/2009    related to BP medications     Unspecified essential hypertension        PAST SURGICAL HISTORY:  Past Surgical History:   Procedure Laterality Date     C NONSPECIFIC PROCEDURE      abd hernia repair     C TOTAL KNEE ARTHROPLASTY  08/23/10    Right knee     Cardiac stents       COLONOSCOPY N/A 2/17/2016    Procedure: COMBINED COLONOSCOPY, SINGLE OR MULTIPLE BIOPSY/POLYPECTOMY BY BIOPSY;  Surgeon: Jose Gan MD;  [facility]: [facility]     ESOPHAGOSCOPY, GASTROSCOPY, DUODENOSCOPY (EGD), COMBINED N/A 12/17/2014    Procedure: COMBINED ESOPHAGOSCOPY, GASTROSCOPY, DUODENOSCOPY (EGD);  Surgeon: Kaz Mccoy MD;  Location: [facility]     HC REMV CATARACT EXTRACAP,INSERT LENS,COMP      darrian     HC UGI ENDOSCOPY DIAG W BIOPSY  12/16/09     HC YAG LASER CAPSULOTOMY  9/17/2009    Right eye     HEART CATH, ANGIOPLASTY  10/03/2012    Stent of LAD     HEART CATH, ANGIOPLASTY  05/17/2014     CAD, patent stent of LAD     LAPAROSCOPIC CHOLECYSTECTOMY N/A 12/3/2016    Procedure: LAPAROSCOPIC CHOLECYSTECTOMY;  Surgeon: Viral Meyers MD;  [facility]     LAPAROSCOPIC HERNIORRHAPHY HIATAL N/A 1/30/2015    Procedure: LAPAROSCOPIC HERNIORRHAPHY HIATAL;  Surgeon: Chase Camara MD;  Location:  "[facility]     LAPAROSCOPIC NISSEN FUNDOPLICATION N/A 1/30/2015    Procedure: LAPAROSCOPIC NISSEN FUNDOPLICATION;  Surgeon: Chase Camara MD;  [facility]     MOHS MICROGRAPHIC PROCEDURE  09/14/11    left upper forehead-09/14/11       FAMILY HISTORY:  Family History   Problem Relation Age of Onset     DIABETES No family hx of      Cardiovascular Brother      Cardiovascular Father      Cardiovascular Mother      Hypertension Mother      Lipids Mother      Cardiovascular Sister      stents x 2-3     Hypertension Sister      Cardiovascular Sister      MI 64       SOCIAL HISTORY:  Social History     Social History     Marital status:      Spouse name: N/A     Number of children: N/A     Years of education: N/A     Occupational History     retired      Social History Main Topics     Smoking status: Never Smoker     Smokeless tobacco: Never Used     Alcohol use No     Drug use: No     Sexual activity: No     Other Topics Concern     Not on file     Social History Narrative    Lives with daughters family since June 2007. Moved from [place].       Review of Systems:  Skin:  Negative       Eyes:  Positive for      ENT:  Negative      Respiratory:  Positive for shortness of breath;dyspnea on exertion     Cardiovascular:  Negative for;palpitations Positive for;chest pain;edema;lightheadedness;dizziness when doing the dishes will get cramping in chest and back, mainly when has arms up,   Gastroenterology: Negative      Genitourinary:  Negative      Musculoskeletal:  Positive for arthritis;back pain;neck pain;joint pain    Neurologic:  Positive for   looses balance real easy, uses a cane  Psychiatric:  Positive for anxiety on meds   Heme/Lymph/Imm:  Positive for allergies    Endocrine:  Positive for night sweats terrible sweats any time during the day or night     Physical Exam:  Vitals: /78 (BP Location: Right arm, Patient Position: Fowlers, Cuff Size: Adult Regular)  Pulse 62  Ht 1.6 m (5' 3\")  Wt 66.7 " kg (147 lb)  SpO2 96%  BMI 26.04 kg/m2    Constitutional:           Skin:           Head:           Eyes:           ENT:           Neck:           Chest:             Cardiac:                    Abdomen:           Vascular:                                          Extremities and Back:                 Neurological:                 CC  No referring provider defined for this encounter.

## 2017-09-05 NOTE — LETTER
9/5/2017      RE: Monik Santiago  [address] [id number]       Dear Colleague,    Thank you for the opportunity to participate in the care of your patient, Monik Santiago, at the [facility]. Please see a copy of my visit note below.    HPI and Plan:   See dictation(#[id number])    Orders Placed This Encounter   Procedures     NM Exercise stress test (nuc card)     Follow-Up with Cardiac Advanced Practice Provider       No orders of the defined types were placed in this encounter.      There are no discontinued medications.      Encounter Diagnosis   Name Primary?     Atypical chest pain Yes       CURRENT MEDICATIONS:  Current Outpatient Prescriptions   Medication Sig Dispense Refill     amLODIPine (NORVASC) 5 MG tablet Take 1 tablet (5 mg) by mouth daily 30 tablet 3     lisinopril (PRINIVIL/ZESTRIL) 20 MG tablet Take 1 tablet (20 mg) by mouth 2 times daily 60 tablet 0     Multiple Vitamins-Minerals (MULTIVITAMIN ADULT) CHEW Take 2 chew tab by mouth daily       loperamide (IMODIUM) 2 MG capsule Take 2 mg by mouth 4 times daily as needed for diarrhea       Probiotic Product (PROBIOTIC DAILY PO) Take 1 capsule by mouth daily       furosemide (LASIX) 20 MG tablet TAKE HALF A TABLET BY MOUTH ONCE DAILY 45 tablet 2     DULoxetine (CYMBALTA) 20 MG EC capsule Take 1 capsule (20 mg) by mouth 2 times daily 180 capsule 3     metoprolol (LOPRESSOR) 25 MG tablet Take 1 tablet (25 mg) by mouth 2 times daily 180 tablet 3     atorvastatin (LIPITOR) 40 MG tablet Take 1 tablet (40 mg) by mouth daily 90 tablet 3     ASPIRIN PO Take 81 mg by mouth daily       ACETAMINOPHEN PO Take 500-1,000 mg by mouth every 8 hours as needed for pain       Lysine 500 MG TABS Take 1 tablet by mouth daily as needed        NITROGLYCERIN SL Place 0.4 mg under the tongue         ALLERGIES     Allergies   Allergen Reactions     Codeine Fatigue     Latex      Lidocaine Other (See  Comments)     was one of 3 drugs given during GA that caused difficulty with anesthesia reversal     Sulfa Drugs Hives     Trimethoprim Hives     Valium Fatigue       PAST MEDICAL HISTORY:  Past Medical History:   Diagnosis Date     Clostridium difficile diarrhea      Coronary artery disease      Generalized anxiety disorder      GERD (gastroesophageal reflux disease)      History of MRI of brain and brain stem 4/10/2015    MR BRAIN FOR STROKE COMPLETE W/O & W CONTRAST 4/9/2015 9:34 PM MRI of the brain without and with contrast MRA of the head without contrast MRA of the neck without and with contrast History: eval for PRESS, Comparison: 3/19/2015,  Technique:  Brain: Axial diffusion-weighted with ADC map, T2-weighted with fat saturation, T1-weighted and turboFLAIR and coronal T1-weighted images of the brain were obt     Myocardial infarction (H)     NSTEMI     Other alteration of consciousness 7/2007    see neuro consult 7/25/2007. MinCep THOMSON was neg. Does not have seizures.      Other and unspecified hyperlipidemia      Syncope 10/19/2009    related to BP medications     Unspecified essential hypertension        PAST SURGICAL HISTORY:  Past Surgical History:   Procedure Laterality Date     C NONSPECIFIC PROCEDURE      abd hernia repair     C TOTAL KNEE ARTHROPLASTY  08/23/10    Right knee     Cardiac stents       COLONOSCOPY N/A 2/17/2016    Procedure: COMBINED COLONOSCOPY, SINGLE OR MULTIPLE BIOPSY/POLYPECTOMY BY BIOPSY;  Surgeon: Jose Gan MD;  [facility]     ESOPHAGOSCOPY, GASTROSCOPY, DUODENOSCOPY (EGD), COMBINED N/A 12/17/2014    Procedure: COMBINED ESOPHAGOSCOPY, GASTROSCOPY, DUODENOSCOPY (EGD);  Surgeon: Kaz Mccoy MD;  [facility]     HC REMV CATARACT EXTRACAP,INSERT LENS,COMP      darrian     HC UGI ENDOSCOPY DIAG W BIOPSY  12/16/09     HC YAG LASER CAPSULOTOMY  9/17/2009    Right eye     HEART CATH, ANGIOPLASTY  10/03/2012    Stent of LAD     HEART CATH, ANGIOPLASTY  05/17/2014     CAD,  patent stent of LAD     LAPAROSCOPIC CHOLECYSTECTOMY N/A 12/3/2016    Procedure: LAPAROSCOPIC CHOLECYSTECTOMY;  Surgeon: Viral Meyers MD;  [facility]     LAPAROSCOPIC HERNIORRHAPHY HIATAL N/A 1/30/2015    Procedure: LAPAROSCOPIC HERNIORRHAPHY HIATAL;  Surgeon: Chase Camara MD;  [facility]     LAPAROSCOPIC NISSEN FUNDOPLICATION N/A 1/30/2015    Procedure: LAPAROSCOPIC NISSEN FUNDOPLICATION;  Surgeon: Chase Camara MD;  [facility]     MOHS MICROGRAPHIC PROCEDURE  09/14/11    left upper forehead-09/14/11       FAMILY HISTORY:  Family History   Problem Relation Age of Onset     DIABETES No family hx of      Cardiovascular Brother      Cardiovascular Father      Cardiovascular Mother      Hypertension Mother      Lipids Mother      Cardiovascular Sister      stents x 2-3     Hypertension Sister      Cardiovascular Sister      MI 64       SOCIAL HISTORY:  Social History     Social History     Marital status:      Spouse name: N/A     Number of children: N/A     Years of education: N/A     Occupational History     retired      Social History Main Topics     Smoking status: Never Smoker     Smokeless tobacco: Never Used     Alcohol use No     Drug use: No     Sexual activity: No     Other Topics Concern     Not on file     Social History Narrative    Lives with daughters family since June 2007. Moved from [place].       Review of Systems:  Skin:  Negative       Eyes:  Positive for      ENT:  Negative      Respiratory:  Positive for shortness of breath;dyspnea on exertion     Cardiovascular:  Negative for;palpitations Positive for;chest pain;edema;lightheadedness;dizziness when doing the dishes will get cramping in chest and back, mainly when has arms up,   Gastroenterology: Negative      Genitourinary:  Negative      Musculoskeletal:  Positive for arthritis;back pain;neck pain;joint pain    Neurologic:  Positive for   looses balance real easy, uses a cane  Psychiatric:   "Positive for anxiety on meds   Heme/Lymph/Imm:  Positive for allergies    Endocrine:  Positive for night sweats terrible sweats any time during the day or night     Physical Exam:  Vitals: /78 (BP Location: Right arm, Patient Position: Fowlers, Cuff Size: Adult Regular)  Pulse 62  Ht 1.6 m (5' 3\")  Wt 66.7 kg (147 lb)  SpO2 96%  BMI 26.04 kg/m2    Constitutional:         Skin:         Head:         Eyes:         ENT:         Neck:         Chest:           Cardiac:                  Abdomen:         Vascular:                                        Extremities and Back:               Neurological:           Yung Angel MD    "

## 2017-09-05 NOTE — MR AVS SNAPSHOT
"              After Visit Summary   9/5/2017    Monik Santiago    MRN: [id number]           Patient Information     Date Of Birth          1940        Visit Information        Provider Department      9/5/2017 10:30 AM Yung Angel MD [facility]        Today's Diagnoses     Atypical chest pain    -  1       Follow-ups after your visit        Additional Services     Follow-Up with Cardiac Advanced Practice Provider                 Future tests that were ordered for you today     Open Future Orders        Priority Expected Expires Ordered    Follow-Up with Cardiac Advanced Practice Provider Routine 10/5/2017 9/5/2018 9/5/2017    NM Exercise stress test (nuc card) Routine 9/12/2017 9/5/2018 9/5/2017            Who to contact     If you have questions or need follow up information about today's clinic visit or your schedule please contact [facility] directly at [phone].  Normal or non-critical lab and imaging results will be communicated to you by MyChart, letter or phone within 4 business days after the clinic has received the results. If you do not hear from us within 7 days, please contact the clinic through MyChart or phone. If you have a critical or abnormal lab result, we will notify you by phone as soon as possible.  Submit refill requests through Audiam or call your pharmacy and they will forward the refill request to us. Please allow 3 business days for your refill to be completed.          Additional Information About Your Visit        MyChart Information     Audiam lets you send messages to your doctor, view your test results, renew your prescriptions, schedule appointments and more. To sign up, go to [email or web address] . Click on \"Log in\" on the left side of the screen, which will take you to the Welcome page. Then click on \"Sign up Now\" on the right side of the page.     You will be asked to enter the access code listed below, as well as some personal " "information. Please follow the directions to create your username and password.     Your access code is: [email or web address]  Expires: 2017  3:45 PM     Your access code will  in 90 days. If you need help or a new code, please call your [place] clinic or [phone].        Care EveryWhere ID     This is your Care EveryWhere ID. This could be used by other organizations to access your [place] medical records  [id number]        Your Vitals Were     Pulse Height Pulse Oximetry BMI (Body Mass Index)          62 1.6 m (5' 3\") 96% 26.04 kg/m2         Blood Pressure from Last 3 Encounters:   17 126/78   17 182/84   17 163/71    Weight from Last 3 Encounters:   17 66.7 kg (147 lb)   17 66.2 kg (146 lb)   17 66.5 kg (146 lb 9.7 oz)               Primary Care Provider Office Phone # Fax #    Ken Mar [phone]       [facility] [place] [address] [place] [address]        Equal Access to Services     DANIEL SMITH AH: Hadii leonora ku hadasho Soomaali, waaxda luqadaha, qaybta kaalmada adeegyada, francisca dewittn lala douglas. So [facility] [phone].    ATENCIÓN: Si habla español, tiene a hamm disposición servicios gratuitos de asistencia lingüística. [facility] [phone].    We comply with applicable federal civil rights laws and [place] laws. We do not discriminate on the basis of race, color, national origin, age, disability sex, sexual orientation or gender identity.            Thank you!     Thank you for choosing [facility]  for your care. Our goal is always to provide you with excellent care. Hearing back from our patients is one way we can continue to improve our services. Please take a few minutes to complete the written survey that you may receive in the mail after your visit with us. Thank you!             Your Updated Medication List - Protect others around you: Learn how to safely use, store and throw away your " medicines at www.disposemymeds.org.          This list is accurate as of: 9/5/17 11:01 AM.  Always use your most recent med list.                   Brand Name Dispense Instructions for use Diagnosis    ACETAMINOPHEN PO      Take 500-1,000 mg by mouth every 8 hours as needed for pain        amLODIPine 5 MG tablet    NORVASC    30 tablet    Take 1 tablet (5 mg) by mouth daily    Hypertension goal BP (blood pressure) < 140/90       ASPIRIN PO      Take 81 mg by mouth daily        atorvastatin 40 MG tablet    LIPITOR    90 tablet    Take 1 tablet (40 mg) by mouth daily    ASCVD (arteriosclerotic cardiovascular disease), Hyperlipidemia LDL goal <70       DULoxetine 20 MG EC capsule    CYMBALTA    180 capsule    Take 1 capsule (20 mg) by mouth 2 times daily    Adjustment disorder with mixed anxiety and depressed mood       furosemide 20 MG tablet    LASIX    45 tablet    TAKE HALF A TABLET BY MOUTH ONCE DAILY    Swelling of lower limb       lisinopril 20 MG tablet    PRINIVIL/ZESTRIL    60 tablet    Take 1 tablet (20 mg) by mouth 2 times daily    Essential hypertension with goal blood pressure less than 140/90       loperamide 2 MG capsule    IMODIUM     Take 2 mg by mouth 4 times daily as needed for diarrhea        Lysine 500 MG Tabs      Take 1 tablet by mouth daily as needed        metoprolol 25 MG tablet    LOPRESSOR    180 tablet    Take 1 tablet (25 mg) by mouth 2 times daily    Essential hypertension with goal blood pressure less than 140/90       MULTIVITAMIN ADULT Chew      Take 2 chew tab by mouth daily        NITROGLYCERIN SL      Place 0.4 mg under the tongue        PROBIOTIC DAILY PO      Take 1 capsule by mouth daily

## 2017-09-05 NOTE — PROGRESS NOTES
REASON FOR CLINIC VISIT:  Recent hospitalization.      HISTORY OF PRESENT ILLNESS:  Ms. Santiago is a very pleasant 77-year-old female who I saw about a month ago.  The patient has history of CAD with history of LAD PCI in 2012 and she underwent repeat coronary angiogram in 2014 and was found to have patent LAD stent and only mild non-flow-limiting coronary artery disease elsewhere.  It was felt her symptoms were likely from hiatal hernia for which she had surgery and since then her symptoms resolved.  Last month during the clinic visit, she complained of some dizziness and palpitation and her blood pressure was also elevated.  I recommended increasing the dose of lisinopril and to follow up with her primary care physician and also recommended doing an event monitor for a week.  About 10 days later, the patient was admitted in the hospital because of chest discomfort.  She was ruled out for acute coronary syndrome with EKG showing no acute ST-T changes and serial troponins negative.  She underwent echocardiogram that showed normal LV function, normal RV systolic function, no significant valvular disease.  Her blood pressure was found to be elevated and lisinopril dose was increased.  Subsequently, she was seen by Dr. Mar and her blood pressure was still elevated and amlodipine was added.  Today, she is coming in followup accompanied by her daughter.  The patient tells me that her blood pressure is now much better.  In fact, today in the clinic, her blood pressure is 126/78.  She tells me that she does not recall exactly what led to her admission, but she felt some chest tightness at that time.  This was not exertion-related.  She walks her dog every day and no chest discomfort with physical activity.  In fact, she tells me even now she can get chest discomfort which happened when she is using her upper extremity, for example washing dishes or if she raises her arms while changing clothes, and the moment she brings  her arm down the discomfort goes away.  No shortness of breath with physical activity.  I do not have an official report for event monitor, but I do have a strip available which shows patient reported some lightheadedness and the rhythm was sinus at that time.  She is additionally on baby aspirin, Lipitor 40 mg daily.  LDL is well controlled at 45.  Presently she is on metoprolol tartrate 25 mg b.i.d., lisinopril 20 mg b.i.d., amlodipine 5 mg daily and Lasix 10 mg daily.      PHYSICAL EXAMINATION:   VITAL SIGNS:  Blood pressure 126/78, heart rate 62 and regular, weight 147 pounds, BMI 26.   GENERAL:  The patient appears pleasant, comfortable.   NECK:  Normal JVP, no bruit.   CARDIOVASCULAR SYSTEM:  S1, S2 normal.  No murmur, rub or gallop.   RESPIRATORY SYSTEM:  Clear to auscultation bilaterally.   ABDOMEN:  Soft, nontender.   EXTREMITIES:  No pitting pedal edema.   NEUROLOGICAL:  Alert, oriented x3.   PSYCHIATRIC:  Normal affect.   SKIN:  No pallor or icterus.      IMPRESSION AND PLAN:  A very delightful 77-year-old female with history of CAD, hypertension, dyslipidemia, history of hiatal hernia, who was admitted with chest discomfort and was ruled out for acute coronary syndrome.  Echocardiogram showed normal LV function.  Overall, her present chest discomfort appears quite atypical to almost musculoskeletal in nature.  However, the patient is not sure whether she had a different kind of chest discomfort last month, requiring hospitalization.  After a long discussion, we decided to do a nuclear stress test.  We will try exercise nuclear stress test off beta blocker, but if she is not able to achieve target heart rate we can convert this into Lexiscan.  I cautioned patient not to consume any caffeine 12 hours before the stress in case we need to convert into Lexiscan.  If she continues to have musculoskeletal chest discomfort, I recommend she can follow up with Dr. Mar. Until that time, she can use Tylenol on an  as needed basis.  I am setting up a followup in about a month to go over the results of the stress test and also the blood pressure control.         CHET LEMON MD             D: 2017 11:11   T: 2017 11:40   MT: DONIS      Name:     SHARI FARLEY   MRN:      [id number]        Account:      [id number]   :      1940           Service Date: 2017      Document: [id number]

## 2017-09-07 ENCOUNTER — TELEPHONE (OUTPATIENT)
Dept: INTERNAL MEDICINE | Facility: CLINIC | Age: 77
End: 2017-09-07

## 2017-09-07 DIAGNOSIS — M79.89 SWELLING OF LOWER LIMB: ICD-10-CM

## 2017-09-07 NOTE — TELEPHONE ENCOUNTER
Reason for Call:  Other prescription, question     Detailed comments: daughter Scarlet calling is not sure what her Mom should be taking for her furosemide, patient was in the hospital and daughter thinks they may have upped the dosage, please call and advise, Scarlet states that if the dosage was upped, she needs a new Rx called into [facility] in [place], ([facility])    Phone Number Patient can be reached at: Cell number on file:    Telephone Information:   Mobile [phone]       Best Time: any     Can we leave a detailed message on this number? YES    Call taken on 9/7/2017 at 11:47 AM by Suri Rios

## 2017-09-08 RX ORDER — FUROSEMIDE 20 MG
20 TABLET ORAL DAILY
Qty: 90 TABLET | Refills: 1 | Status: SHIPPED | OUTPATIENT
Start: 2017-09-08 | End: 2018-03-11

## 2017-09-08 NOTE — TELEPHONE ENCOUNTER
"Telephone call back to Monik Novak's daughter.  Per Dr. Mar's note from office visit on 8/22/17:    \"She's developed some elevated hypertension despite her lisinopril being up to 20 twice a day. I will increase her Lasix from 10 mg to 20 mg, increase or add amlodipine at 5 mg a day for blood pressure.\"    New rx was not ordered at time, and daughter would like refill with new dosage.  Rx pended, Routing refill request to provider for review/approval because:  Dose change  2 of 3 blood pressures elevated    Lasix      Last Written Prescription Date: 3/21/17  Last Fill Quantity: 45, # refills: 1  Last Office Visit with [facility] or [facility] prescribing provider: 8/22/17  Next 5 appointments (look out 90 days)     Oct 03, 2017 11:15 AM CDT   Return Visit with AMANDA [facility] ([facility])    [address] [place] [id number] [phone]                   Potassium   Date Value Ref Range Status   08/17/2017 3.9 3.4 - 5.3 mmol/L Final     Creatinine   Date Value Ref Range Status   08/17/2017 0.90 0.52 - 1.04 mg/dL Final     BP Readings from Last 3 Encounters:   09/05/17 126/78   08/22/17 182/84   08/18/17 163/71     Jacek Osullivan RN, BSN    "

## 2017-09-12 ENCOUNTER — DOCUMENTATION ONLY (OUTPATIENT)
Dept: CARDIOLOGY | Facility: CLINIC | Age: 77
End: 2017-09-12

## 2017-09-12 NOTE — PROGRESS NOTES
Cardiac monitor end of service summery report received showing just the baseline of sinus at 64 bpm.   Was placed for palpitations.   Ready for signature.

## 2017-09-26 ENCOUNTER — HOSPITAL ENCOUNTER (OUTPATIENT)
Dept: NUCLEAR MEDICINE | Facility: CLINIC | Age: 77
Setting detail: NUCLEAR MEDICINE
Discharge: HOME | End: 2017-09-26
Attending: INTERNAL MEDICINE | Admitting: INTERNAL MEDICINE
Payer: MEDICARE

## 2017-09-26 DIAGNOSIS — R07.89 ATYPICAL CHEST PAIN: ICD-10-CM

## 2017-09-26 PROCEDURE — 78452 HT MUSCLE IMAGE SPECT MULT: CPT | Mod: 26 | Performed by: INTERNAL MEDICINE

## 2017-09-26 PROCEDURE — 93016 CV STRESS TEST SUPVJ ONLY: CPT | Performed by: INTERNAL MEDICINE

## 2017-09-26 PROCEDURE — 34300033 ZZH RX 343: Performed by: INTERNAL MEDICINE

## 2017-09-26 PROCEDURE — 25000128 H RX IP 250 OP 636: Performed by: INTERNAL MEDICINE

## 2017-09-26 PROCEDURE — 93017 CV STRESS TEST TRACING ONLY: CPT | Performed by: REHABILITATION PRACTITIONER

## 2017-09-26 PROCEDURE — A9502 TC99M TETROFOSMIN: HCPCS | Performed by: INTERNAL MEDICINE

## 2017-09-26 PROCEDURE — 78452 HT MUSCLE IMAGE SPECT MULT: CPT

## 2017-09-26 PROCEDURE — 93018 CV STRESS TEST I&R ONLY: CPT | Performed by: INTERNAL MEDICINE

## 2017-09-26 RX ORDER — AMINOPHYLLINE 25 MG/ML
50 INJECTION, SOLUTION INTRAVENOUS ONCE
Status: DISCONTINUED | OUTPATIENT
Start: 2017-09-26 | End: 2017-09-27 | Stop reason: HOSPADM

## 2017-09-26 RX ORDER — REGADENOSON 0.08 MG/ML
0.4 INJECTION, SOLUTION INTRAVENOUS ONCE
Status: COMPLETED | OUTPATIENT
Start: 2017-09-26 | End: 2017-09-26

## 2017-09-26 RX ADMIN — TETROFOSMIN 32.7 MCI.: 1.38 INJECTION, POWDER, LYOPHILIZED, FOR SOLUTION INTRAVENOUS at 11:45

## 2017-09-26 RX ADMIN — REGADENOSON 0.4 MG: 0.08 INJECTION, SOLUTION INTRAVENOUS at 11:36

## 2017-09-26 RX ADMIN — TETROFOSMIN 10.4 MCI.: 1.38 INJECTION, POWDER, LYOPHILIZED, FOR SOLUTION INTRAVENOUS at 09:30

## 2017-09-26 NOTE — PROGRESS NOTES
The following medication was given:     MEDICATION: Aminophylline   ROUTE: IV  SITE: Forearm - Left  DOSE: 50 mg x2  LOT #: -[id number]  :  Brady  EXPIRATION DATE:  03/01/2018  NDC#: 4738-4105-48  Zuri Vizcarra RN

## 2017-10-03 ENCOUNTER — OFFICE VISIT (OUTPATIENT)
Dept: CARDIOLOGY | Facility: CLINIC | Age: 77
End: 2017-10-03
Payer: MEDICARE

## 2017-10-03 VITALS
HEART RATE: 66 BPM | SYSTOLIC BLOOD PRESSURE: 138 MMHG | BODY MASS INDEX: 26.67 KG/M2 | WEIGHT: 150.5 LBS | HEIGHT: 63 IN | OXYGEN SATURATION: 99 % | DIASTOLIC BLOOD PRESSURE: 78 MMHG

## 2017-10-03 DIAGNOSIS — R07.89 ATYPICAL CHEST PAIN: ICD-10-CM

## 2017-10-03 PROCEDURE — 99214 OFFICE O/P EST MOD 30 MIN: CPT | Performed by: NURSE PRACTITIONER

## 2017-10-03 NOTE — PATIENT INSTRUCTIONS
If you have questions or concerns please call clinic at [phone].    Please call [phone] for scheduling.      It was a pleasure seeing you today!     Reminder: Please bring in all current medications, over the counter supplements and vitamin bottles to your next appointment.

## 2017-10-03 NOTE — MR AVS SNAPSHOT
"              After Visit Summary   10/3/2017    Monik Santiago    MRN: [id number]           Patient Information     Date Of Birth          1940        Visit Information        Provider Department      10/3/2017 11:15 AM Elizabeth Laguna APRN CNP [facility]        Today's Diagnoses     Atypical chest pain          Care Instructions    If you have questions or concerns please call clinic at [phone].    Please call [phone] for scheduling.      It was a pleasure seeing you today!     Reminder: Please bring in all current medications, over the counter supplements and vitamin bottles to your next appointment.            Follow-ups after your visit        Who to contact     If you have questions or need follow up information about today's clinic visit or your schedule please contact [facility] directly at [phone].  Normal or non-critical lab and imaging results will be communicated to you by MyChart, letter or phone within 4 business days after the clinic has received the results. If you do not hear from us within 7 days, please contact the clinic through MyChart or phone. If you have a critical or abnormal lab result, we will notify you by phone as soon as possible.  Submit refill requests through locr or call your pharmacy and they will forward the refill request to us. Please allow 3 business days for your refill to be completed.          Additional Information About Your Visit        MyChart Information     locr lets you send messages to your doctor, view your test results, renew your prescriptions, schedule appointments and more. To sign up, go to [email or web address] . Click on \"Log in\" on the left side of the screen, which will take you to the Welcome page. Then click on \"Sign up Now\" on the right side of the page.     You will be asked to enter the access code listed below, as well as some personal information. Please follow the directions to create " "your username and password.     Your access code is: [email or web address]  Expires: 2017  3:45 PM     Your access code will  in 90 days. If you need help or a new code, please call your [facility] or [phone].        Care EveryWhere ID     This is your Care EveryWhere ID. This could be used by other organizations to access your [place] medical records  [id number]        Your Vitals Were     Pulse Height Pulse Oximetry BMI (Body Mass Index)          66 1.6 m (5' 3\") 99% 26.66 kg/m2         Blood Pressure from Last 3 Encounters:   10/03/17 138/78   17 126/78   17 182/84    Weight from Last 3 Encounters:   10/03/17 68.3 kg (150 lb 8 oz)   17 66.7 kg (147 lb)   17 66.2 kg (146 lb)              We Performed the Following     Follow-Up with Cardiac Advanced Practice Provider        Primary Care Provider Office Phone # Fax #    Ken Mar [phone]       [facility] [address] [place] [address]        Equal Access to Services     DANIEL SMITH AH: Hadii aad ku hadasho Soomaali, waaxda luqadaha, qaybta kaalmada adeegyada, waxay idiin haydestinyn lala martinez lasonian ah. So [facility] [phone].    ATENCIÓN: Si habla español, tiene a hamm disposición servicios gratuitos de asistencia lingüística. Llame al [phone].    We comply with applicable federal civil rights laws and [place] laws. We do not discriminate on the basis of race, color, national origin, age, disability, sex, sexual orientation, or gender identity.            Thank you!     Thank you for choosing [facility]  for your care. Our goal is always to provide you with excellent care. Hearing back from our patients is one way we can continue to improve our services. Please take a few minutes to complete the written survey that you may receive in the mail after your visit with us. Thank you!             Your Updated Medication List - Protect others around you: Learn how to " safely use, store and throw away your medicines at www.disposemymeds.org.          This list is accurate as of: 10/3/17 11:28 AM.  Always use your most recent med list.                   Brand Name Dispense Instructions for use Diagnosis    ACETAMINOPHEN PO      Take 500-1,000 mg by mouth every 8 hours as needed for pain        amLODIPine 5 MG tablet    NORVASC    30 tablet    Take 1 tablet (5 mg) by mouth daily    Hypertension goal BP (blood pressure) < 140/90       ASPIRIN PO      Take 81 mg by mouth daily        atorvastatin 40 MG tablet    LIPITOR    90 tablet    Take 1 tablet (40 mg) by mouth daily    ASCVD (arteriosclerotic cardiovascular disease), Hyperlipidemia LDL goal <70       DULoxetine 20 MG EC capsule    CYMBALTA    180 capsule    Take 1 capsule (20 mg) by mouth 2 times daily    Adjustment disorder with mixed anxiety and depressed mood       furosemide 20 MG tablet    LASIX    90 tablet    Take 1 tablet (20 mg) by mouth daily    Swelling of lower limb       lisinopril 20 MG tablet    PRINIVIL/ZESTRIL    60 tablet    Take 1 tablet (20 mg) by mouth 2 times daily    Essential hypertension with goal blood pressure less than 140/90       loperamide 2 MG capsule    IMODIUM     Take 2 mg by mouth 4 times daily as needed for diarrhea        Lysine 500 MG Tabs      Take 1 tablet by mouth daily as needed        metoprolol 25 MG tablet    LOPRESSOR    180 tablet    Take 1 tablet (25 mg) by mouth 2 times daily    Essential hypertension with goal blood pressure less than 140/90       MULTIVITAMIN ADULT Chew      Take 2 chew tab by mouth daily        NITROGLYCERIN SL      Place 0.4 mg under the tongue        PROBIOTIC DAILY PO      Take 1 capsule by mouth daily

## 2017-10-03 NOTE — LETTER
10/3/2017      RE: Monik Santiago  [address] [facility] [id number]       Dear Colleague,    Thank you for the opportunity to participate in the care of your patient, Monik Santiago, at the [facility]. Please see a copy of my visit note below.    Cardiology Clinic Progress Note  Monik Santiago MRN# [id number]   YOB: 1940 Age: 77 year old     Reason for visit: Follow up nuclear stress test           Assessment and Plan:     1. Atypical chest pain and known history CAD    History of PCI to her LAD in 2012 with repeat angiogram in 2014 showing patent LAD stent and only mildly non-flow-limiting coronary artery disease elsewhere    Recent nuclear stress test did not reveal ischemia or infarction    Continue present medical therapy and follow-up with Dr. Angel in August 2018    2. Hypertension    Blood pressure was 138/78     Continue amlodipine 5 mg daily, lisinopril 20 mg twice daily and metoprolol 25 mg twice daily    3. Hyperlipidemia    Last LDL was 45 and January 2016    Continue atorvastatin 40 mg daily         History of Presenting Illness:    Monik Santiago is a very pleasant 77 year old patient of Dr. Angel who presents today for a follow up to review a nuclear stress test.  She has a past medical history significant for coronary artery disease status post PCI to her LAD in 2012.  A repeat angiogram was completed in 2014 that showed a patent LAD stent with only mildly non-flow-limiting coronary artery disease elsewhere.  She also has a history of hypertension, hyperlipidemia, chronic kidney disease hiatal hernia, and palpitations.    She's been following closely with Dr. Angel for palpitations and hypertension.  He increased lisinopril and ordered an event monitor for 10 days.  She had been recently admitted to the hospital due to chest discomfort and ruled out for acute coronary syndrome with her EKG showing no acute ST-T changes  and negative serial troponins.  An echocardiogram during that admission showed normal LV and RV function without significant valvular disease.  She did have continued elevated blood pressure and her lisinopril dose was again increased.  She saw her primary care provider Dr. Mar, and her blood pressure continued to be elevated and amlodipine was added.    At her visit with Dr. Angel last month her blood pressure was under control at 126/78.  She states that her chest pain was nonexertional and it would be associated with using her upper extremities for example washing dishes or changing clothes.  The event monitor did not reveal any significant abnormalities with the summary showing only a baseline rhythm of sinus.  Due to her recent admission for chest discomfort, and the patient being unclear if she had different chest discomfort that lead to admission other than the musculoskeletal sounding pain and a nuclear stress test was ordered.  The stress test revealed no evidence of ischemia or infarct and her LVEF was 73%. The results were reviewed with the patient and her daughter.    The patient presents today with her daughter without any recurrent chest discomfort.  She continues to get palpitations in the evening that resolved on their own.  Her blood pressure is under adequate control today.          This note was completed in part using Dragon voice recognition software. Although reviewed after completion, some word and grammatical errors may occur       Review of Systems:   Review of Systems:  Skin:  Negative     Eyes:  Positive for glasses  ENT:  Negative    Respiratory:  Positive for shortness of breath;dyspnea on exertion  Cardiovascular:  Negative for;palpitations;edema Positive for;chest pain;lightheadedness;dizziness  Gastroenterology: Negative    Genitourinary:  Negative    Musculoskeletal:  Positive for arthritis;back pain;neck pain;joint pain  Neurologic:  Positive for    Psychiatric:  Positive for  "anxiety  Heme/Lymph/Imm:  Positive for allergies  Endocrine:  Positive for night sweats              Physical Exam:     Vitals: /78  Pulse 66  Ht 1.6 m (5' 3\")  Wt 68.3 kg (150 lb 8 oz)  SpO2 99%  BMI 26.66 kg/m2  Constitutional:  cooperative, alert and oriented, well developed, well nourished, in no acute distress        Skin:  warm and dry to the touch, no apparent skin lesions or masses noted        Head:  normocephalic, no masses or lesions        Eyes:  pupils equal and round;conjunctivae and lids unremarkable        ENT:  no pallor or cyanosis, dentition good        Chest:  clear to auscultation;normal symmetry        Cardiac: regular rhythm;normal S1 and S2;apical impulse not displaced;no murmurs, gallops or rubs detected                  Extremities and Back:  no edema        Neurological:  no gross motor deficits;affect appropriate               Medications:     Current Outpatient Prescriptions   Medication Sig Dispense Refill     furosemide (LASIX) 20 MG tablet Take 1 tablet (20 mg) by mouth daily 90 tablet 1     amLODIPine (NORVASC) 5 MG tablet Take 1 tablet (5 mg) by mouth daily 30 tablet 3     lisinopril (PRINIVIL/ZESTRIL) 20 MG tablet Take 1 tablet (20 mg) by mouth 2 times daily 60 tablet 0     Multiple Vitamins-Minerals (MULTIVITAMIN ADULT) CHEW Take 2 chew tab by mouth daily       loperamide (IMODIUM) 2 MG capsule Take 2 mg by mouth 4 times daily as needed for diarrhea       Probiotic Product (PROBIOTIC DAILY PO) Take 1 capsule by mouth daily       DULoxetine (CYMBALTA) 20 MG EC capsule Take 1 capsule (20 mg) by mouth 2 times daily 180 capsule 3     metoprolol (LOPRESSOR) 25 MG tablet Take 1 tablet (25 mg) by mouth 2 times daily 180 tablet 3     atorvastatin (LIPITOR) 40 MG tablet Take 1 tablet (40 mg) by mouth daily 90 tablet 3     ASPIRIN PO Take 81 mg by mouth daily       ACETAMINOPHEN PO Take 500-1,000 mg by mouth every 8 hours as needed for pain       Lysine 500 MG TABS Take 1 tablet " by mouth daily as needed        NITROGLYCERIN SL Place 0.4 mg under the tongue             Family History   Problem Relation Age of Onset     DIABETES No family hx of      Cardiovascular Brother      Cardiovascular Father      Cardiovascular Mother      Hypertension Mother      Lipids Mother      Cardiovascular Sister      stents x 2-3     Hypertension Sister      Cardiovascular Sister      MI 64       Social History     Social History     Marital status:      Spouse name: N/A     Number of children: N/A     Years of education: N/A     Occupational History     retired      Social History Main Topics     Smoking status: Never Smoker     Smokeless tobacco: Never Used     Alcohol use No     Drug use: No     Sexual activity: No     Other Topics Concern     Not on file     Social History Narrative    Lives with daughters family since June 2007. Moved from [place].            Past Medical History:     Past Medical History:   Diagnosis Date     Clostridium difficile diarrhea      Coronary artery disease      Generalized anxiety disorder      GERD (gastroesophageal reflux disease)      History of MRI of brain and brain stem 4/10/2015    MR BRAIN FOR STROKE COMPLETE W/O & W CONTRAST 4/9/2015 9:34 PM MRI of the brain without and with contrast MRA of the head without contrast MRA of the neck without and with contrast History: eval for PRESS, Comparison: 3/19/2015,  Technique:  Brain: Axial diffusion-weighted with ADC map, T2-weighted with fat saturation, T1-weighted and turboFLAIR and coronal T1-weighted images of the brain were obt     Myocardial infarction (H)     NSTEMI     Other alteration of consciousness 7/2007    see neuro consult 7/25/2007. MinCep THOMSON was neg. Does not have seizures.      Other and unspecified hyperlipidemia      Syncope 10/19/2009    related to BP medications     Unspecified essential hypertension               Past Surgical History:     Past Surgical History:   Procedure Laterality Date     C  NONSPECIFIC PROCEDURE      abd hernia repair     C TOTAL KNEE ARTHROPLASTY  08/23/10    Right knee     Cardiac stents       COLONOSCOPY N/A 2/17/2016    Procedure: COMBINED COLONOSCOPY, SINGLE OR MULTIPLE BIOPSY/POLYPECTOMY BY BIOPSY;  Surgeon: Jose Gan MD;  Location: [facility]     ESOPHAGOSCOPY, GASTROSCOPY, DUODENOSCOPY (EGD), COMBINED N/A 12/17/2014    Procedure: COMBINED ESOPHAGOSCOPY, GASTROSCOPY, DUODENOSCOPY (EGD);  Surgeon: Kaz Mccoy MD;  Location: [facility]     HC REMV CATARACT EXTRACAP,INSERT LENS,COMP      darrian     HC UGI ENDOSCOPY DIAG W BIOPSY  12/16/09     HC YAG LASER CAPSULOTOMY  9/17/2009    Right eye     HEART CATH, ANGIOPLASTY  10/03/2012    Stent of LAD     HEART CATH, ANGIOPLASTY  05/17/2014     CAD, patent stent of LAD     LAPAROSCOPIC CHOLECYSTECTOMY N/A 12/3/2016    Procedure: LAPAROSCOPIC CHOLECYSTECTOMY;  Surgeon: Viral Meyers MD;  [facility]     LAPAROSCOPIC HERNIORRHAPHY HIATAL N/A 1/30/2015    Procedure: LAPAROSCOPIC HERNIORRHAPHY HIATAL;  Surgeon: Chase Camara MD;  [facility]     LAPAROSCOPIC NISSEN FUNDOPLICATION N/A 1/30/2015    Procedure: LAPAROSCOPIC NISSEN FUNDOPLICATION;  Surgeon: Chase Camara MD;  [facility]: [facility]     MOHS MICROGRAPHIC PROCEDURE  09/14/11    left upper forehead-09/14/11              Allergies:   Codeine; Latex; Lidocaine; Sulfa drugs; Trimethoprim; and Valium       Data:   All laboratory data reviewed:    LAST CHOLESTEROL:  Lab Results   Component Value Date    CHOL 136 01/25/2016     Lab Results   Component Value Date    HDL 82 01/25/2016     Lab Results   Component Value Date    LDL 45 01/25/2016     Lab Results   Component Value Date    TRIG 44 01/25/2016     Lab Results   Component Value Date    CHOLHDLRATIO 1.9 03/04/2015       LAST BMP:  Lab Results   Component Value Date     08/17/2017      Lab Results   Component Value Date    POTASSIUM 3.9 08/17/2017     Lab Results   Component Value Date     CHLORIDE 108 08/17/2017     Lab Results   Component Value Date    MONSERRAT 8.9 08/17/2017     Lab Results   Component Value Date    CO2 31 08/17/2017     Lab Results   Component Value Date    BUN 23 08/17/2017     Lab Results   Component Value Date    CR 0.90 08/17/2017     Lab Results   Component Value Date    GLC 93 08/17/2017       LAST CBC:  Lab Results   Component Value Date    WBC 5.6 08/17/2017     Lab Results   Component Value Date    RBC 4.16 08/17/2017     Lab Results   Component Value Date    HGB 12.1 08/17/2017     Lab Results   Component Value Date    HCT 39.1 08/17/2017     Lab Results   Component Value Date    MCV 94 08/17/2017     Lab Results   Component Value Date    MCH 29.1 08/17/2017     Lab Results   Component Value Date    MCHC 30.9 08/17/2017     Lab Results   Component Value Date    RDW 15.0 08/17/2017     Lab Results   Component Value Date     08/17/2017         STEVEN ESCUDERO, APRN, CNP

## 2017-10-03 NOTE — PROGRESS NOTES
Cardiology Clinic Progress Note  Monik Santiago MRN# [id number]   YOB: 1940 Age: 77 year old     Reason for visit: Follow up nuclear stress test           Assessment and Plan:     1. Atypical chest pain and known history CAD    History of PCI to her LAD in 2012 with repeat angiogram in 2014 showing patent LAD stent and only mildly non-flow-limiting coronary artery disease elsewhere    Recent nuclear stress test did not reveal ischemia or infarction    Continue present medical therapy and follow-up with Dr. Angel in August 2018    2. Hypertension    Blood pressure was 138/78     Continue amlodipine 5 mg daily, lisinopril 20 mg twice daily and metoprolol 25 mg twice daily    3. Hyperlipidemia    Last LDL was 45 and January 2016    Continue atorvastatin 40 mg daily         History of Presenting Illness:    Monik Santiago is a very pleasant 77 year old patient of Dr. Angel who presents today for a follow up to review a nuclear stress test.  She has a past medical history significant for coronary artery disease status post PCI to her LAD in 2012.  A repeat angiogram was completed in 2014 that showed a patent LAD stent with only mildly non-flow-limiting coronary artery disease elsewhere.  She also has a history of hypertension, hyperlipidemia, chronic kidney disease hiatal hernia, and palpitations.    She's been following closely with Dr. Angel for palpitations and hypertension.  He increased lisinopril and ordered an event monitor for 10 days.  She had been recently admitted to the hospital due to chest discomfort and ruled out for acute coronary syndrome with her EKG showing no acute ST-T changes and negative serial troponins.  An echocardiogram during that admission showed normal LV and RV function without significant valvular disease.  She did have continued elevated blood pressure and her lisinopril dose was again increased.  She saw her primary care provider Dr. Mar, and her blood pressure continued to  "be elevated and amlodipine was added.    At her visit with Dr. Angel last month her blood pressure was under control at 126/78.  She states that her chest pain was nonexertional and it would be associated with using her upper extremities for example washing dishes or changing clothes.  The event monitor did not reveal any significant abnormalities with the summary showing only a baseline rhythm of sinus.  Due to her recent admission for chest discomfort, and the patient being unclear if she had different chest discomfort that lead to admission other than the musculoskeletal sounding pain and a nuclear stress test was ordered.  The stress test revealed no evidence of ischemia or infarct and her LVEF was 73%. The results were reviewed with the patient and her daughter.    The patient presents today with her daughter without any recurrent chest discomfort.  She continues to get palpitations in the evening that resolved on their own.  Her blood pressure is under adequate control today.          This note was completed in part using Dragon voice recognition software. Although reviewed after completion, some word and grammatical errors may occur       Review of Systems:   Review of Systems:  Skin:  Negative     Eyes:  Positive for glasses  ENT:  Negative    Respiratory:  Positive for shortness of breath;dyspnea on exertion  Cardiovascular:  Negative for;palpitations;edema Positive for;chest pain;lightheadedness;dizziness  Gastroenterology: Negative    Genitourinary:  Negative    Musculoskeletal:  Positive for arthritis;back pain;neck pain;joint pain  Neurologic:  Positive for    Psychiatric:  Positive for anxiety  Heme/Lymph/Imm:  Positive for allergies  Endocrine:  Positive for night sweats              Physical Exam:     Vitals: /78  Pulse 66  Ht 1.6 m (5' 3\")  Wt 68.3 kg (150 lb 8 oz)  SpO2 99%  BMI 26.66 kg/m2  Constitutional:  cooperative, alert and oriented, well developed, well nourished, in no acute " distress        Skin:  warm and dry to the touch, no apparent skin lesions or masses noted        Head:  normocephalic, no masses or lesions        Eyes:  pupils equal and round;conjunctivae and lids unremarkable        ENT:  no pallor or cyanosis, dentition good        Chest:  clear to auscultation;normal symmetry        Cardiac: regular rhythm;normal S1 and S2;apical impulse not displaced;no murmurs, gallops or rubs detected                  Extremities and Back:  no edema        Neurological:  no gross motor deficits;affect appropriate               Medications:     Current Outpatient Prescriptions   Medication Sig Dispense Refill     furosemide (LASIX) 20 MG tablet Take 1 tablet (20 mg) by mouth daily 90 tablet 1     amLODIPine (NORVASC) 5 MG tablet Take 1 tablet (5 mg) by mouth daily 30 tablet 3     lisinopril (PRINIVIL/ZESTRIL) 20 MG tablet Take 1 tablet (20 mg) by mouth 2 times daily 60 tablet 0     Multiple Vitamins-Minerals (MULTIVITAMIN ADULT) CHEW Take 2 chew tab by mouth daily       loperamide (IMODIUM) 2 MG capsule Take 2 mg by mouth 4 times daily as needed for diarrhea       Probiotic Product (PROBIOTIC DAILY PO) Take 1 capsule by mouth daily       DULoxetine (CYMBALTA) 20 MG EC capsule Take 1 capsule (20 mg) by mouth 2 times daily 180 capsule 3     metoprolol (LOPRESSOR) 25 MG tablet Take 1 tablet (25 mg) by mouth 2 times daily 180 tablet 3     atorvastatin (LIPITOR) 40 MG tablet Take 1 tablet (40 mg) by mouth daily 90 tablet 3     ASPIRIN PO Take 81 mg by mouth daily       ACETAMINOPHEN PO Take 500-1,000 mg by mouth every 8 hours as needed for pain       Lysine 500 MG TABS Take 1 tablet by mouth daily as needed        NITROGLYCERIN SL Place 0.4 mg under the tongue             Family History   Problem Relation Age of Onset     DIABETES No family hx of      Cardiovascular Brother      Cardiovascular Father      Cardiovascular Mother      Hypertension Mother      Lipids Mother      Cardiovascular Sister       stents x 2-3     Hypertension Sister      Cardiovascular Sister      MI 64       Social History     Social History     Marital status:      Spouse name: N/A     Number of children: N/A     Years of education: N/A     Occupational History     retired      Social History Main Topics     Smoking status: Never Smoker     Smokeless tobacco: Never Used     Alcohol use No     Drug use: No     Sexual activity: No     Other Topics Concern     Not on file     Social History Narrative    Lives with daughters family since June 2007. Moved from [place].            Past Medical History:     Past Medical History:   Diagnosis Date     Clostridium difficile diarrhea      Coronary artery disease      Generalized anxiety disorder      GERD (gastroesophageal reflux disease)      History of MRI of brain and brain stem 4/10/2015    MR BRAIN FOR STROKE COMPLETE W/O & W CONTRAST 4/9/2015 9:34 PM MRI of the brain without and with contrast MRA of the head without contrast MRA of the neck without and with contrast History: eval for PRESS, Comparison: 3/19/2015,  Technique:  Brain: Axial diffusion-weighted with ADC map, T2-weighted with fat saturation, T1-weighted and turboFLAIR and coronal T1-weighted images of the brain were obt     Myocardial infarction (H)     NSTEMI     Other alteration of consciousness 7/2007    see neuro consult 7/25/2007. MinCep THOMSON was neg. Does not have seizures.      Other and unspecified hyperlipidemia      Syncope 10/19/2009    related to BP medications     Unspecified essential hypertension               Past Surgical History:     Past Surgical History:   Procedure Laterality Date     C NONSPECIFIC PROCEDURE      abd hernia repair     C TOTAL KNEE ARTHROPLASTY  08/23/10    Right knee     Cardiac stents       COLONOSCOPY N/A 2/17/2016    Procedure: COMBINED COLONOSCOPY, SINGLE OR MULTIPLE BIOPSY/POLYPECTOMY BY BIOPSY;  Surgeon: Jose Gan MD;  Location:  GI     ESOPHAGOSCOPY, GASTROSCOPY,  DUODENOSCOPY (EGD), COMBINED N/A 12/17/2014    Procedure: COMBINED ESOPHAGOSCOPY, GASTROSCOPY, DUODENOSCOPY (EGD);  Surgeon: Kaz Mccoy MD;  [facility]     HC REMV CATARACT EXTRACAP,INSERT LENS,COMP      darrian     HC UGI ENDOSCOPY DIAG W BIOPSY  12/16/09     HC YAG LASER CAPSULOTOMY  9/17/2009    Right eye     HEART CATH, ANGIOPLASTY  10/03/2012    Stent of LAD     HEART CATH, ANGIOPLASTY  05/17/2014     CAD, patent stent of LAD     LAPAROSCOPIC CHOLECYSTECTOMY N/A 12/3/2016    Procedure: LAPAROSCOPIC CHOLECYSTECTOMY;  Surgeon: Viral Meyers MD;  [facility]     LAPAROSCOPIC HERNIORRHAPHY HIATAL N/A 1/30/2015    Procedure: LAPAROSCOPIC HERNIORRHAPHY HIATAL;  Surgeon: Chase Camara MD;  [facility]     LAPAROSCOPIC NISSEN FUNDOPLICATION N/A 1/30/2015    Procedure: LAPAROSCOPIC NISSEN FUNDOPLICATION;  Surgeon: Chase Camara MD;  [facility]: [facility]     MOHS MICROGRAPHIC PROCEDURE  09/14/11    left upper forehead-09/14/11              Allergies:   Codeine; Latex; Lidocaine; Sulfa drugs; Trimethoprim; and Valium       Data:   All laboratory data reviewed:    LAST CHOLESTEROL:  Lab Results   Component Value Date    CHOL 136 01/25/2016     Lab Results   Component Value Date    HDL 82 01/25/2016     Lab Results   Component Value Date    LDL 45 01/25/2016     Lab Results   Component Value Date    TRIG 44 01/25/2016     Lab Results   Component Value Date    CHOLHDLRATIO 1.9 03/04/2015       LAST BMP:  Lab Results   Component Value Date     08/17/2017      Lab Results   Component Value Date    POTASSIUM 3.9 08/17/2017     Lab Results   Component Value Date    CHLORIDE 108 08/17/2017     Lab Results   Component Value Date    MONSERRAT 8.9 08/17/2017     Lab Results   Component Value Date    CO2 31 08/17/2017     Lab Results   Component Value Date    BUN 23 08/17/2017     Lab Results   Component Value Date    CR 0.90 08/17/2017     Lab Results   Component Value Date    GLC 93  08/17/2017       LAST CBC:  Lab Results   Component Value Date    WBC 5.6 08/17/2017     Lab Results   Component Value Date    RBC 4.16 08/17/2017     Lab Results   Component Value Date    HGB 12.1 08/17/2017     Lab Results   Component Value Date    HCT 39.1 08/17/2017     Lab Results   Component Value Date    MCV 94 08/17/2017     Lab Results   Component Value Date    MCH 29.1 08/17/2017     Lab Results   Component Value Date    MCHC 30.9 08/17/2017     Lab Results   Component Value Date    RDW 15.0 08/17/2017     Lab Results   Component Value Date     08/17/2017         STEVEN ESCUDERO, AMANDA, CNP

## 2017-10-06 DIAGNOSIS — I10 ESSENTIAL HYPERTENSION WITH GOAL BLOOD PRESSURE LESS THAN 140/90: ICD-10-CM

## 2017-10-06 NOTE — TELEPHONE ENCOUNTER
lisinopril      Last Written Prescription Date: 8/18/17  Last Fill Quantity: 60, # refills: 0  Last Office Visit with [facility], [facility] or [facility] prescribing provider: 8/22/17       Potassium   Date Value Ref Range Status   08/17/2017 3.9 3.4 - 5.3 mmol/L Final     Creatinine   Date Value Ref Range Status   08/17/2017 0.90 0.52 - 1.04 mg/dL Final     BP Readings from Last 3 Encounters:   10/03/17 138/78   09/05/17 126/78   08/22/17 182/84

## 2017-10-11 RX ORDER — LISINOPRIL 20 MG/1
TABLET ORAL
Qty: 60 TABLET | Refills: 8 | Status: SHIPPED | OUTPATIENT
Start: 2017-10-11 | End: 2018-06-29

## 2017-10-11 NOTE — TELEPHONE ENCOUNTER
Prescription approved per [facility] Refill Protocol.    Carolina Alberto RN, BSN

## 2017-10-19 ENCOUNTER — OFFICE VISIT (OUTPATIENT)
Dept: OBGYN | Facility: OTHER | Age: 77
End: 2017-10-19
Payer: MEDICARE

## 2017-10-19 VITALS
WEIGHT: 148.5 LBS | HEART RATE: 64 BPM | BODY MASS INDEX: 26.31 KG/M2 | SYSTOLIC BLOOD PRESSURE: 136 MMHG | DIASTOLIC BLOOD PRESSURE: 64 MMHG

## 2017-10-19 DIAGNOSIS — N90.89 LABIAL LESION: Primary | ICD-10-CM

## 2017-10-19 DIAGNOSIS — N90.4 LICHEN SCLEROSUS ET ATROPHICUS OF THE VULVA: ICD-10-CM

## 2017-10-19 PROCEDURE — 56605 BIOPSY OF VULVA/PERINEUM: CPT | Performed by: OBSTETRICS & GYNECOLOGY

## 2017-10-19 PROCEDURE — 88305 TISSUE EXAM BY PATHOLOGIST: CPT | Performed by: OBSTETRICS & GYNECOLOGY

## 2017-10-19 PROCEDURE — 99203 OFFICE O/P NEW LOW 30 MIN: CPT | Mod: 25 | Performed by: OBSTETRICS & GYNECOLOGY

## 2017-10-19 PROCEDURE — 88305 TISSUE EXAM BY PATHOLOGIST: CPT | Mod: 26 | Performed by: OBSTETRICS & GYNECOLOGY

## 2017-10-19 NOTE — PROGRESS NOTES
OB/GYN New   10/19/2017      NAME:  Monik Santiago  PCP:  MarKen RONAK  MRN:  [id number]      Impression / Plan     77 year old  with:      ICD-10-CM    1. Labial lesion N90.89 BIOPSY SKIN/SUBQ/MUC MEM, SINGLE LESION     Surgical pathology exam   2. Lichen sclerosus et atrophicus of the vulva N90.4 BIOPSY SKIN/SUBQ/MUC MEM, SINGLE LESION     Surgical pathology exam       Discussed possible etiologies for her symptoms. She has some thickening in the area that is bleeding. Biopsy done today. See below. I will contact her with results and follow-up recommendations.  If biopsy shows no ARCHANA or cancer, then recommend clobetasol ointment. Instructions and precautions given. Patient was given a diagram today outlining the area of the biopsy and the area to which she should apply the ointment.  All questions answered and she is in agreement with plan.     Chief Complaint     Chief Complaint   Patient presents with     Lesion     lesion that bleeds       HPI     Monik Santiago is a  77 year old female who is seen for vulvar lesion.    Patient has previously seen Dr. Maloney for lichen sclerosis.  She has not been seen for that in almost 4 years.      Patient has had intermittent itching of the vulva. She states it's controlled with hand lotion.  Recently she has noticed bleeding from the right side. It has been off and on for about a week.  She has no other abnormal discharge. No other concerns today.    No LMP recorded. Patient is postmenopausal.       Problem List     Patient Active Problem List    Diagnosis Date Noted     Chest pain, atypical 2012     Priority: High     ASCVD (arteriosclerotic cardiovascular disease) 2012     Priority: High     Chest pain 2017     Priority: Medium     RUQ abdominal pain 2016     Priority: Medium     S/P laparoscopic cholecystectomy 2016     Priority: Medium     History of MRI of brain and brain stem 04/10/2015     Priority: Medium     MR BRAIN  FOR STROKE COMPLETE W/O & W CONTRAST 4/9/2015 9:34 PM  MRI of the brain without and with contrast  MRA of the head without contrast  MRA of the neck without and with contrast  History: eval for PRESS,  Comparison: 3/19/2015,   Technique:   Brain: Axial diffusion-weighted with ADC map, T2-weighted with fat  saturation, T1-weighted and turboFLAIR and coronal T1-weighted images  of the brain were obtained without intravenous contrast. Following  intravenous administration of gadolinium, axial turboFLAIR and coronal  T1-weighted images of the brain were obtained.   MRA: 3D time-of-flight MR angiography of the major arteries at the  base of the brain was performed without contrast. 2D time-of-flight  MRA without contrast with superior and inferior saturation bands and  3D T1-weighted postgadolinium MRA of the neck/cervical vessels were  also obtained. 3-dimensional reconstructions were created of the MRA  of both the head and the neck, which were reviewed by the radiologist.  Contrast: 10mL Gadavist  Findings: No areas of restricted diffusion. Marked volume loss  particularly affecting frontal, parietal and medial temporal lobe.s  Moderate confluent periventricular deep white white matter FLAIR  hyperintensities similar in appearance to previous exam. Some apparent  faint postcontrast T1 hyperintensity in the left cerebellar hemisphere  adjacent to the fourth ventricle is favored to be related to artifact.  No abnormal contrast enhancement is noted. Acute intracranial  hemorrhage or extra-axial collection. There is no hydrocephalus.  MR angiogram of the head: Posterior circulation appears patent.  Evaluation of anterior circulation is markedly limited due to motion  artifact. Bilateral intracranial internal carotid arteries are grossly  patent.  MR angiography of the neck demonstrates patent origins of the carotid  and vertebral arteries. There are codominant vertebral arteries which  are patent throughout the neck.  Bilateral common carotid arteries,  carotid bifurcations and internal carotid arteries are patent.  IMPRESSION  Impression:   No acute infarction acute intracranial hemorrhage or extra-axial  collection. No hydrocephalus.  Marked parenchymal volume loss particularly affecting the frontal  convexity, parietal lobes and bilateral medial temporal lobes.  Confluent white matter T2 hyperintensities in the periventricular deep  white matter are most compatible with moderate chronic microvascular  ischemic changes.  MR angiogram of the head is partially limited; particularly the  anterior circulation evaluation is limited.   Neck MR angiogram demonstrates no hemodynamically significant  stenosis.  I have personally reviewed the examination and initial interpretation  and I agree with the findings.  RAVINDER REYNOSO MD       Altered mental status 04/08/2015     Priority: Medium     Headache 04/08/2015     Priority: Medium     Problem list name updated by automated process. Provider to review       History of C.diff colitis 04/08/2015     Priority: Medium     Anxiety 04/08/2015     Priority: Medium     Lightheaded 03/07/2015     Priority: Medium     Lightheadedness 03/06/2015     Priority: Medium     Nausea without vomiting 02/06/2015     Priority: Medium     Problem list name updated by automated process. 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 Provider to review       Near syncope 05/20/2014     Priority: Medium     Coronary atherosclerosis of native coronary artery (VESSEL) 05/20/2014     Priority: Medium     Lichen sclerosus et atrophicus of the vulva 10/08/2013     Priority: Medium     CKD (chronic kidney disease) stage 3, GFR 30-59 ml/min 11/19/2012     Priority: Medium     Acute worsening of stage 3 chronic kidney disease 11/19/2012     Priority: Medium     Diagnosis updated by automated process. Provider to review and confirm.       NSTEMI (non-ST elevated myocardial infarction), history 10/06/2012     Priority: Medium     Acute myocardial infarction 10/05/2012     Priority: Medium     Hypoxia 10/05/2012     Priority: Medium     Hypertension goal BP (blood pressure) < 140/90 05/02/2011     Priority: Medium     Osteoarthritis 09/13/2010     Priority: Medium     GERD (gastroesophageal reflux disease)      Priority: Medium     Other alteration of consciousness 07/01/2007     Priority: Medium     see neuro consult 7/25/2007       Systolic CHF, chronic (H) 11/20/2012     Priority: Low     Anemia 11/20/2012     Priority: Low     DVT prophylaxis 11/20/2012     Priority: Low     Advanced directives, counseling/discussion 05/16/2012     Priority: Low     Advance Directive received and scanned. Click on Code in the patient header to view. 5/16/2012          Hyperlipidemia LDL goal <70 10/31/2010     Priority: Low     Generalized anxiety disorder      Priority: Low       Medications     Current Outpatient Prescriptions   Medication     lisinopril (PRINIVIL/ZESTRIL) 20 MG tablet     furosemide (LASIX) 20 MG tablet     amLODIPine (NORVASC) 5 MG tablet     Multiple Vitamins-Minerals (MULTIVITAMIN ADULT) CHEW     loperamide (IMODIUM) 2 MG capsule     Probiotic Product (PROBIOTIC DAILY PO)     DULoxetine (CYMBALTA) 20 MG EC capsule      metoprolol (LOPRESSOR) 25 MG tablet     atorvastatin (LIPITOR) 40 MG tablet     ASPIRIN PO     ACETAMINOPHEN PO     NITROGLYCERIN SL     Lysine 500 MG TABS     No current facility-administered medications for this visit.         Allergies     Allergies   Allergen Reactions     Codeine Fatigue     Latex      Lidocaine Other (See Comments)     was one of 3 drugs given during GA that caused difficulty with anesthesia reversal     Sulfa Drugs Hives     Trimethoprim Hives     Valium Fatigue       Past Medical/Surgical History     Past Medical History:   Diagnosis Date     Clostridium difficile diarrhea      Coronary artery disease      Generalized anxiety disorder      GERD (gastroesophageal reflux disease)      History of MRI of brain and brain stem 4/10/2015    MR BRAIN FOR STROKE COMPLETE W/O & W CONTRAST 4/9/2015 9:34 PM MRI of the brain without and with contrast MRA of the head without contrast MRA of the neck without and with contrast History: eval for PRESS, Comparison: 3/19/2015,  Technique:  Brain: Axial diffusion-weighted with ADC map, T2-weighted with fat saturation, T1-weighted and turboFLAIR and coronal T1-weighted images of the brain were obt     Myocardial infarction     NSTEMI     Other alteration of consciousness 7/2007    see neuro consult 7/25/2007. MinCep THOMSON was neg. Does not have seizures.      Other and unspecified hyperlipidemia      Syncope 10/19/2009    related to BP medications     Unspecified essential hypertension        Past Surgical History:   Procedure Laterality Date     C NONSPECIFIC PROCEDURE      abd hernia repair     C TOTAL KNEE ARTHROPLASTY  08/23/10    Right knee     Cardiac stents       COLONOSCOPY N/A 2/17/2016    Procedure: COMBINED COLONOSCOPY, SINGLE OR MULTIPLE BIOPSY/POLYPECTOMY BY BIOPSY;  Surgeon: Jose Gan MD;  Location:  GI     ESOPHAGOSCOPY, GASTROSCOPY, DUODENOSCOPY (EGD), COMBINED N/A 12/17/2014    Procedure: COMBINED ESOPHAGOSCOPY, GASTROSCOPY, DUODENOSCOPY  (EGD);  Surgeon: Kaz Mccoy MD;  Location: [facility]     HC REMV CATARACT EXTRACAP,INSERT LENS,COMP      darrian     HC UGI ENDOSCOPY DIAG W BIOPSY  12/16/09     HC YAG LASER CAPSULOTOMY  9/17/2009    Right eye     HEART CATH, ANGIOPLASTY  10/03/2012    Stent of LAD     HEART CATH, ANGIOPLASTY  05/17/2014     CAD, patent stent of LAD     LAPAROSCOPIC CHOLECYSTECTOMY N/A 12/3/2016    Procedure: LAPAROSCOPIC CHOLECYSTECTOMY;  Surgeon: Viral Meyers MD;  Location: [facility]     LAPAROSCOPIC HERNIORRHAPHY HIATAL N/A 1/30/2015    Procedure: LAPAROSCOPIC HERNIORRHAPHY HIATAL;  Surgeon: Chase Camara MD;  Location: [facility]     LAPAROSCOPIC NISSEN FUNDOPLICATION N/A 1/30/2015    Procedure: LAPAROSCOPIC NISSEN FUNDOPLICATION;  Surgeon: Chase Camara MD;  Location: [facility]     MOHS MICROGRAPHIC PROCEDURE  09/14/11    left upper forehead-09/14/11        Social History     Social History     Social History     Marital status:      Spouse name: N/A     Number of children: N/A     Years of education: N/A     Occupational History     retired      Social History Main Topics     Smoking status: Never Smoker     Smokeless tobacco: Never Used     Alcohol use No     Drug use: No     Sexual activity: No     Other Topics Concern     Not on file     Social History Narrative    Lives with daughters family since June 2007. Moved from [place].       Family History      Family History   Problem Relation Age of Onset     Cardiovascular Father      Cardiovascular Brother      Cardiovascular Mother      Hypertension Mother      Lipids Mother      Cardiovascular Sister      stents x 2-3     Hypertension Sister      Cardiovascular Sister      MI 64     DIABETES No family hx of        ROS     A GI/ organ review of systems was asked and the pertinent positives and negatives are listed in the HPI.      Physical Exam   Vitals: /64 (BP Location: Left arm, Patient Position: Chair, Cuff Size: Adult Regular)  Pulse 64   Wt 148 lb 8 oz (67.4 kg)  BMI 26.31 kg/m2    General: Comfortable, no obvious distress, normal  body habitus  Psych: Alert and orientated x 3. Appropriate affect, good insight.   : Lesion of the right labia. There is fusion of the majora and minora at the posterior aspect of the introitus bilaterally. There is loss of architecture due to lichen sclerosus. She also has erosion at the posterior fourchette. Extending from 6:00 to 9:00 on the right side there is a purple lesion that appears to have been bleeding. From 9:00 to the clitoris there is hypopigmentation consistent with lichen sclerosus.             20 minutes was spent with patient, more than 50% counseling and coordinating care, exclusive of the biopsy    Elida Pruett MD       PROCEDURE: Biopsy    After discussing the procedure and obtaining consent the patient was prepped in the usual fashion with betadine.  The area to be biopsied was injected with local anesthetic, and a 3 mm punch biopsy was used and the specimen removed with scissors.  The area was treated with silver nitrate and hemostasis noted.  The patient tolerated it well.     Biopsy site:  9 oclock on the right labia (where the majora and minora appear to have met).

## 2017-10-19 NOTE — MR AVS SNAPSHOT
After Visit Summary   10/19/2017    Monik Santiago    MRN: [id number]           Patient Information     Date Of Birth          1940        Visit Information        Provider Department      10/19/2017 11:00 AM Elida Pruett MD [facility]        Today's Diagnoses     Labial lesion    -  1    Lichen sclerosus et atrophicus of the vulva           Follow-ups after your visit        Follow-up notes from your care team     Return in about 2 weeks (around 11/2/2017).      Your next 10 appointments already scheduled     Nov 13, 2017 10:00 AM CST   Office Visit with Elida Pruett MD   [facility] ([facility])    [address] [facility] [address] [phone]           Bring a current list of meds and any records pertaining to this visit. For Physicals, please bring immunization records and any forms needing to be filled out. Please arrive 10 minutes early to complete paperwork.              Who to contact     If you have questions or need follow up information about today's clinic visit or your schedule please contact [facility] directly at [phone].  Normal or non-critical lab and imaging results will be communicated to you by MyChart, letter or phone within 4 business days after the clinic has received the results. If you do not hear from us within 7 days, please contact the clinic through [employer] or phone. If you have a critical or abnormal lab result, we will notify you by phone as soon as possible.  Submit refill requests through [employer] or call your pharmacy and they will forward the refill request to us. Please allow 3 business days for your refill to be completed.          Additional Information About Your Visit        MyChart Information     [employer] lets you send messages to your doctor, view your test results, renew your prescriptions, schedule appointments and more. To sign up, go to [email or web address] .  "Click on \"Log in\" on the left side of the screen, which will take you to the Welcome page. Then click on \"Sign up Now\" on the right side of the page.     You will be asked to enter the access code listed below, as well as some personal information. Please follow the directions to create your username and password.     Your access code is: [email or web address]  Expires: 2017  3:45 PM     Your access code will  in 90 days. If you need help or a new code, please call your [facility] clinic or [phone].        Care EveryWhere ID     This is your Care EveryWhere ID. This could be used by other organizations to access your [facility] medical records  [id number]        Your Vitals Were     Pulse BMI (Body Mass Index)                64 26.31 kg/m2           Blood Pressure from Last 3 Encounters:   10/19/17 136/64   10/03/17 138/78   17 126/78    Weight from Last 3 Encounters:   10/19/17 148 lb 8 oz (67.4 kg)   10/03/17 150 lb 8 oz (68.3 kg)   17 147 lb (66.7 kg)              We Performed the Following     BIOPSY SKIN/SUBQ/MUC MEM, SINGLE LESION     Surgical pathology exam        Primary Care Provider Office Phone # Fax #    Ken Mar [phone]       [facility] [address] [place] [address]        Equal Access to Services     [facility]: Hadii aad ku hadasho Soomaali, waaxda luqadaha, qaybta kaalmada adeegyada, waxay kathy pickett . So [facility] [phone].    ATENCIÓN: Si habla español, tiene a hamm disposición servicios gratuitos de asistencia lingüística. Llame al [phone].    We comply with applicable federal civil rights laws and [place] laws. We do not discriminate on the basis of race, color, national origin, age, disability, sex, sexual orientation, or gender identity.            Thank you!     Thank you for choosing [facility]  for your care. Our goal is always to provide you with excellent care. Hearing back from " our patients is one way we can continue to improve our services. Please take a few minutes to complete the written survey that you may receive in the mail after your visit with us. Thank you!             Your Updated Medication List - Protect others around you: Learn how to safely use, store and throw away your medicines at www.disposemymeds.org.          This list is accurate as of: 10/19/17  5:33 PM.  Always use your most recent med list.                   Brand Name Dispense Instructions for use Diagnosis    ACETAMINOPHEN PO      Take 500-1,000 mg by mouth every 8 hours as needed for pain        amLODIPine 5 MG tablet    NORVASC    30 tablet    Take 1 tablet (5 mg) by mouth daily    Hypertension goal BP (blood pressure) < 140/90       ASPIRIN PO      Take 81 mg by mouth daily        atorvastatin 40 MG tablet    LIPITOR    90 tablet    Take 1 tablet (40 mg) by mouth daily    ASCVD (arteriosclerotic cardiovascular disease), Hyperlipidemia LDL goal <70       DULoxetine 20 MG EC capsule    CYMBALTA    180 capsule    Take 1 capsule (20 mg) by mouth 2 times daily    Adjustment disorder with mixed anxiety and depressed mood       furosemide 20 MG tablet    LASIX    90 tablet    Take 1 tablet (20 mg) by mouth daily    Swelling of lower limb       lisinopril 20 MG tablet    PRINIVIL/ZESTRIL    60 tablet    TAKE 1 TABLET (20 MG) BY MOUTH 2 TIMES DAILY    Essential hypertension with goal blood pressure less than 140/90       loperamide 2 MG capsule    IMODIUM     Take 2 mg by mouth 4 times daily as needed for diarrhea        Lysine 500 MG Tabs      Take 1 tablet by mouth daily as needed        metoprolol 25 MG tablet    LOPRESSOR    180 tablet    Take 1 tablet (25 mg) by mouth 2 times daily    Essential hypertension with goal blood pressure less than 140/90       MULTIVITAMIN ADULT Chew      Take 2 chew tab by mouth daily        NITROGLYCERIN SL      Place 0.4 mg under the tongue        PROBIOTIC DAILY PO      Take 1  capsule by mouth daily

## 2017-10-24 LAB — COPATH REPORT: NORMAL

## 2017-10-25 ENCOUNTER — TELEPHONE (OUTPATIENT)
Dept: OBGYN | Facility: OTHER | Age: 77
End: 2017-10-25

## 2017-10-25 DIAGNOSIS — L90.0 LICHEN SCLEROSUS: Primary | ICD-10-CM

## 2017-10-25 RX ORDER — CLOBETASOL PROPIONATE 0.5 MG/G
OINTMENT TOPICAL
Qty: 30 G | Refills: 2 | Status: SHIPPED | OUTPATIENT
Start: 2017-10-25 | End: 2018-03-22

## 2017-10-25 NOTE — TELEPHONE ENCOUNTER
MITZY on her daughter's phone asking the patient to return our call. When she calls back, please let her know her biopsy showed lichen sclerosis, which was discussed at her visit.  I recommend she start clobetasol and use a very small amount every night until I see her for her follow up visit. This was sent to the [facility] in [place].  A diagram and instructions were given to her at her last visit.  I am happy to talk with her if she has questions.  I am on call today and only in clinic in the am tomorrow.

## 2017-11-13 ENCOUNTER — OFFICE VISIT (OUTPATIENT)
Dept: OBGYN | Facility: OTHER | Age: 77
End: 2017-11-13
Payer: MEDICARE

## 2017-11-13 VITALS
SYSTOLIC BLOOD PRESSURE: 120 MMHG | BODY MASS INDEX: 27.01 KG/M2 | HEART RATE: 76 BPM | WEIGHT: 152.5 LBS | DIASTOLIC BLOOD PRESSURE: 60 MMHG

## 2017-11-13 DIAGNOSIS — L90.0 LICHEN SCLEROSUS: Primary | ICD-10-CM

## 2017-11-13 PROCEDURE — 99213 OFFICE O/P EST LOW 20 MIN: CPT | Performed by: OBSTETRICS & GYNECOLOGY

## 2017-11-13 NOTE — NURSING NOTE
"Chief Complaint   Patient presents with     Lesion     labial lesion       Initial /60 (BP Location: Left arm, Patient Position: Chair, Cuff Size: Adult Regular)  Pulse 76  Wt 152 lb 8 oz (69.2 kg)  BMI 27.01 kg/m2 Estimated body mass index is 27.01 kg/(m^2) as calculated from the following:    Height as of 10/3/17: 5' 3\" (1.6 m).    Weight as of this encounter: 152 lb 8 oz (69.2 kg).  BP completed using cuff size: regular        The following HM Due: NONE      The following patient reported/Care Every where data was sent to:  P ABSTRACT QUALITY INITIATIVES [[id number]]       N/a    Sade Tariq CMA  2017           "

## 2017-11-13 NOTE — MR AVS SNAPSHOT
"              After Visit Summary   11/13/2017    Monik Santiago    MRN: [id number]           Patient Information     Date Of Birth          1940        Visit Information        Provider Department      11/13/2017 10:00 AM Elida Pruett MD [facility]        Today's Diagnoses     Lichen sclerosus    -  1       Follow-ups after your visit        Follow-up notes from your care team     Return in about 2 months (around 1/13/2018) for Follow up visit.      Your next 10 appointments already scheduled     Caleb 15, 2018 10:00 AM CST   Office Visit with Elida Pruett MD   [facility] ([facility])    [address]  [facility] [address] [phone]           Bring a current list of meds and any records pertaining to this visit. For Physicals, please bring immunization records and any forms needing to be filled out. Please arrive 10 minutes early to complete paperwork.              Who to contact     If you have questions or need follow up information about today's clinic visit or your schedule please contact [facility] directly at [phone].  Normal or non-critical lab and imaging results will be communicated to you by MyChart, letter or phone within 4 business days after the clinic has received the results. If you do not hear from us within 7 days, please contact the clinic through Accerahart or phone. If you have a critical or abnormal lab result, we will notify you by phone as soon as possible.  Submit refill requests through [employer]venteon or call your pharmacy and they will forward the refill request to us. Please allow 3 business days for your refill to be completed.          Additional Information About Your Visit        [employer] Information     Aventeon lets you send messages to your doctor, view your test results, renew your prescriptions, schedule appointments and more. To sign up, go to [email or web address] . Click on \"Log in\" on the left " "side of the screen, which will take you to the Welcome page. Then click on \"Sign up Now\" on the right side of the page.     You will be asked to enter the access code listed below, as well as some personal information. Please follow the directions to create your username and password.     Your access code is: [email or web address]  Expires: 2018  2:36 PM     Your access code will  in 90 days. If you need help or a new code, please call your [place] clinic or [phone].        Care EveryWhere ID     This is your Care EveryWhere ID. This could be used by other organizations to access your [place] medical records  [id number]        Your Vitals Were     Pulse BMI (Body Mass Index)                76 27.01 kg/m2           Blood Pressure from Last 3 Encounters:   17 120/60   10/19/17 136/64   10/03/17 138/78    Weight from Last 3 Encounters:   17 152 lb 8 oz (69.2 kg)   10/19/17 148 lb 8 oz (67.4 kg)   10/03/17 150 lb 8 oz (68.3 kg)              Today, you had the following     No orders found for display       Primary Care Provider Office Phone # Fax #    Ken Mar [phone]       [place]  [address] [place] [address]        Equal Access to Services     DANIEL SMITH AH: Hadii aad ku hadasho Soomaali, waaxda luqadaha, qaybta kaalmada adeegyada, francisca douglas. So [facility] [phone].    ATENCIÓN: Si habla español, tiene a hamm disposición servicios gratuitos de asistencia lingüística. Geni al [phone].    We comply with applicable federal civil rights laws and [place] laws. We do not discriminate on the basis of race, color, national origin, age, disability, sex, sexual orientation, or gender identity.            Thank you!     Thank you for choosing [facility]  for your care. Our goal is always to provide you with excellent care. Hearing back from our patients is one way we can continue to improve our services. " Please take a few minutes to complete the written survey that you may receive in the mail after your visit with us. Thank you!             Your Updated Medication List - Protect others around you: Learn how to safely use, store and throw away your medicines at www.disposemymeds.org.          This list is accurate as of: 11/13/17  2:36 PM.  Always use your most recent med list.                   Brand Name Dispense Instructions for use Diagnosis    ACETAMINOPHEN PO      Take 500-1,000 mg by mouth every 8 hours as needed for pain        amLODIPine 5 MG tablet    NORVASC    30 tablet    Take 1 tablet (5 mg) by mouth daily    Hypertension goal BP (blood pressure) < 140/90       ASPIRIN PO      Take 81 mg by mouth daily        atorvastatin 40 MG tablet    LIPITOR    90 tablet    Take 1 tablet (40 mg) by mouth daily    ASCVD (arteriosclerotic cardiovascular disease), Hyperlipidemia LDL goal <70       clobetasol 0.05 % ointment    TEMOVATE    30 g    Apply sparingly to affected area for two weeks, then 2-3 times per week.    Lichen sclerosus       DULoxetine 20 MG EC capsule    CYMBALTA    180 capsule    Take 1 capsule (20 mg) by mouth 2 times daily    Adjustment disorder with mixed anxiety and depressed mood       furosemide 20 MG tablet    LASIX    90 tablet    Take 1 tablet (20 mg) by mouth daily    Swelling of lower limb       lisinopril 20 MG tablet    PRINIVIL/ZESTRIL    60 tablet    TAKE 1 TABLET (20 MG) BY MOUTH 2 TIMES DAILY    Essential hypertension with goal blood pressure less than 140/90       loperamide 2 MG capsule    IMODIUM     Take 2 mg by mouth 4 times daily as needed for diarrhea        Lysine 500 MG Tabs      Take 1 tablet by mouth daily as needed        metoprolol 25 MG tablet    LOPRESSOR    180 tablet    Take 1 tablet (25 mg) by mouth 2 times daily    Essential hypertension with goal blood pressure less than 140/90       MULTIVITAMIN ADULT Chew      Take 2 chew tab by mouth daily         NITROGLYCERIN SL      Place 0.4 mg under the tongue        PROBIOTIC DAILY PO      Take 1 capsule by mouth daily

## 2017-11-13 NOTE — PROGRESS NOTES
OB/GYN  2017      NAME:  Monik Santiago  PCP:  Zaid Ken RONAK  MRN:  [id number]    Impression / Plan     77 year old  with:      ICD-10-CM    1. Lichen sclerosus L90.0        She will used the clobetasol every other day until I see her in 2-3 months.  She will follow up sooner as needed.  Reviewed instructions.  She had no further questions.      Chief Complaint     Chief Complaint   Patient presents with     Lesion     labial lesion       HPI     Monik Santiago is a  77 year old female who is seen for follow up.    I last saw the patient 10/19/17 for labial lesion.  Biopsy was done and demonstrated lichen sclerosis.   FINAL DIAGNOSIS:   Labia biopsy, right majora/minora junction, 9:00:   - Lichen sclerosus.     She was prescribed clobetasol and instructed to followup.   She feels a little better since starting the clobetasol.  She has been using it daily since I saw her last.  She has no new concerns.      No LMP recorded. Patient is postmenopausal.       Problem List     Patient Active Problem List    Diagnosis Date Noted     Chest pain, atypical 2012     Priority: High     ASCVD (arteriosclerotic cardiovascular disease) 2012     Priority: High     Chest pain 2017     Priority: Medium     RUQ abdominal pain 2016     Priority: Medium     S/P laparoscopic cholecystectomy 2016     Priority: Medium     History of MRI of brain and brain stem 04/10/2015     Priority: Medium     MR BRAIN FOR STROKE COMPLETE W/O & W CONTRAST 2015 9:34 PM  MRI of the brain without and with contrast  MRA of the head without contrast  MRA of the neck without and with contrast  History: deniz for PRESS,  Comparison: 3/19/2015,   Technique:   Brain: Axial diffusion-weighted with ADC map, T2-weighted with fat  saturation, T1-weighted and turboFLAIR and coronal T1-weighted images  of the brain were obtained without intravenous contrast. Following  intravenous administration of gadolinium, axial  turboFLAIR and coronal  T1-weighted images of the brain were obtained.   MRA: 3D time-of-flight MR angiography of the major arteries at the  base of the brain was performed without contrast. 2D time-of-flight  MRA without contrast with superior and inferior saturation bands and  3D T1-weighted postgadolinium MRA of the neck/cervical vessels were  also obtained. 3-dimensional reconstructions were created of the MRA  of both the head and the neck, which were reviewed by the radiologist.  Contrast: 10mL Gadavist  Findings: No areas of restricted diffusion. Marked volume loss  particularly affecting frontal, parietal and medial temporal lobe.s  Moderate confluent periventricular deep white white matter FLAIR  hyperintensities similar in appearance to previous exam. Some apparent  faint postcontrast T1 hyperintensity in the left cerebellar hemisphere  adjacent to the fourth ventricle is favored to be related to artifact.  No abnormal contrast enhancement is noted. Acute intracranial  hemorrhage or extra-axial collection. There is no hydrocephalus.  MR angiogram of the head: Posterior circulation appears patent.  Evaluation of anterior circulation is markedly limited due to motion  artifact. Bilateral intracranial internal carotid arteries are grossly  patent.  MR angiography of the neck demonstrates patent origins of the carotid  and vertebral arteries. There are codominant vertebral arteries which  are patent throughout the neck. Bilateral common carotid arteries,  carotid bifurcations and internal carotid arteries are patent.  IMPRESSION  Impression:   No acute infarction acute intracranial hemorrhage or extra-axial  collection. No hydrocephalus.  Marked parenchymal volume loss particularly affecting the frontal  convexity, parietal lobes and bilateral medial temporal lobes.  Confluent white matter T2 hyperintensities in the periventricular deep  white matter are most compatible with moderate chronic  microvascular  ischemic changes.  MR angiogram of the head is partially limited; particularly the  anterior circulation evaluation is limited.   Neck MR angiogram demonstrates no hemodynamically significant  stenosis.  I have personally reviewed the examination and initial interpretation  and I agree with the findings.  RAVINDER REYNOSO MD       Altered mental status 04/08/2015     Priority: Medium     Headache 04/08/2015     Priority: Medium     Problem list name updated by automated process. Provider to review       History of C.diff colitis 04/08/2015     Priority: Medium     Anxiety 04/08/2015     Priority: Medium     Lightheaded 03/07/2015     Priority: Medium     Lightheadedness 03/06/2015     Priority: Medium     Nausea without vomiting 02/06/2015     Priority: Medium     Problem list name updated by automated process. Provider to review       Recent repair of hiatal hernia 1/30/15 02/06/2015     Priority: Medium     Urine retention - singh placed 2/4/15 02/06/2015     Priority: Medium     Bilateral leg edema 02/06/2015     Priority: Medium     Old myocardial infarction 2012 02/06/2015     Priority: Medium     Pseudoseizure 01/30/2015     Priority: Medium     Acidosis-metabolic 01/30/2015     Priority: Medium     Syncope 06/16/2014     Priority: Medium     Health Care Home 05/27/2014     Priority: Medium     State Tier Level:  Tier 3  Status:  Closed  Care Coordinator:  Michelle Gaytan RN, BSN    See Letters for [facility] Care Plan             Abdominal pain, unspecified abdominal location 05/21/2014     Priority: Medium     Problem list name updated by automated process. Provider to review       Near syncope 05/20/2014     Priority: Medium     Coronary atherosclerosis of native coronary artery (VESSEL) 05/20/2014     Priority: Medium     Lichen sclerosus et atrophicus of the vulva 10/08/2013     Priority: Medium     CKD (chronic kidney disease) stage 3, GFR 30-59 ml/min 11/19/2012     Priority: Medium     Acute  worsening of stage 3 chronic kidney disease 11/19/2012     Priority: Medium     Diagnosis updated by automated process. Provider to review and confirm.       NSTEMI (non-ST elevated myocardial infarction), history 10/06/2012     Priority: Medium     Acute myocardial infarction 10/05/2012     Priority: Medium     Hypoxia 10/05/2012     Priority: Medium     Hypertension goal BP (blood pressure) < 140/90 05/02/2011     Priority: Medium     Osteoarthritis 09/13/2010     Priority: Medium     GERD (gastroesophageal reflux disease)      Priority: Medium     Other alteration of consciousness 07/01/2007     Priority: Medium     see neuro consult 7/25/2007       Systolic CHF, chronic (H) 11/20/2012     Priority: Low     Anemia 11/20/2012     Priority: Low     DVT prophylaxis 11/20/2012     Priority: Low     Advanced directives, counseling/discussion 05/16/2012     Priority: Low     Advance Directive received and scanned. Click on Code in the patient header to view. 5/16/2012          Hyperlipidemia LDL goal <70 10/31/2010     Priority: Low     Generalized anxiety disorder      Priority: Low       Medications     Current Outpatient Prescriptions   Medication     clobetasol (TEMOVATE) 0.05 % ointment     lisinopril (PRINIVIL/ZESTRIL) 20 MG tablet     furosemide (LASIX) 20 MG tablet     amLODIPine (NORVASC) 5 MG tablet     Multiple Vitamins-Minerals (MULTIVITAMIN ADULT) CHEW     loperamide (IMODIUM) 2 MG capsule     Probiotic Product (PROBIOTIC DAILY PO)     DULoxetine (CYMBALTA) 20 MG EC capsule     metoprolol (LOPRESSOR) 25 MG tablet     atorvastatin (LIPITOR) 40 MG tablet     NITROGLYCERIN SL     ASPIRIN PO     ACETAMINOPHEN PO     Lysine 500 MG TABS     No current facility-administered medications for this visit.         Allergies     Allergies   Allergen Reactions     Codeine Fatigue     Latex      Lidocaine Other (See Comments)     was one of 3 drugs given during GA that caused difficulty with anesthesia reversal     Sulfa  Drugs Hives     Trimethoprim Hives     Valium Fatigue       ROS     A /GI organ review of systems was asked and the pertinent positives and negatives are listed in the HPI.     Physical Exam   Vitals: /60 (BP Location: Left arm, Patient Position: Chair, Cuff Size: Adult Regular)  Pulse 76  Wt 152 lb 8 oz (69.2 kg)  BMI 27.01 kg/m2    General: Comfortable, no obvious distress, normal  body habitus  Psych: Alert and orientated x 3. Appropriate affect, good insight.   : Lesion of the right labia. There is fusion of the majora and minora at the posterior aspect of the introitus bilaterally. There is loss of architecture due to lichen sclerosus. She also has erosion at the posterior fourchette. Extending from 6:00 to 9:00 on the right side there is a purple lesion that appears to have been bleeding. From 9:00 to the clitoris there is hypopigmentation consistent with lichen sclerosus.  Biopsy sight is well healed.        Labs/Imaging       Labs were reviewed in Epic       15 minutes was spent with patient, more than 50% counseling and coordinating care    Elida Pruett MD

## 2017-11-17 DIAGNOSIS — I10 HYPERTENSION GOAL BP (BLOOD PRESSURE) < 140/90: ICD-10-CM

## 2017-11-17 NOTE — TELEPHONE ENCOUNTER
Amlodipine 5 MG      Last Written Prescription Date: 8-22-17  Last Fill Quantity: 30, # refills: 3    Last Office Visit with [facility], [facility] or [facility] prescribing provider:  8-22-17   Future Office Visit:    Next 5 appointments (look out 90 days)     Caleb 15, 2018 10:00 AM CST   Office Visit with Elida Pruett MD   [facility]    [address] [facility] [facility] [address] [facility] [address] [phone]                    BP Readings from Last 3 Encounters:   11/13/17 120/60   10/19/17 136/64   10/03/17 138/78

## 2017-11-22 RX ORDER — AMLODIPINE BESYLATE 5 MG/1
5 TABLET ORAL DAILY
Qty: 30 TABLET | Refills: 3 | Status: SHIPPED | OUTPATIENT
Start: 2017-11-22 | End: 2017-12-12

## 2017-12-12 DIAGNOSIS — I10 HYPERTENSION GOAL BP (BLOOD PRESSURE) < 140/90: ICD-10-CM

## 2017-12-12 NOTE — TELEPHONE ENCOUNTER
Requested Prescriptions   Pending Prescriptions Disp Refills     amLODIPine (NORVASC) 5 MG tablet [Pharmacy Med Name: AMLODIPINE BESYLATE 5 MG TAB] 30 tablet 2     Sig: TAKE 1 TABLET BY MOUTH EVERY DAY    Calcium Channel Blockers Protocol  Passed    12/12/2017  4:37 AM       Passed - Blood pressure under 140/90    BP Readings from Last 3 Encounters:   11/13/17 120/60   10/19/17 136/64   10/03/17 138/78                Passed - Recent or future visit with authorizing provider    Patient had office visit in the last year or has a visit in the next 30 days with authorizing provider.  See chart review.              Passed - Patient is age 18 or older       Passed - No active pregnancy on record       Passed - Normal serum creatinine on file in past 12 months    Recent Labs   Lab Test  08/17/17   1329   CR  0.90            Passed - No positive pregnancy test in past 12 months

## 2017-12-18 ENCOUNTER — OFFICE VISIT (OUTPATIENT)
Dept: INTERNAL MEDICINE | Facility: CLINIC | Age: 77
End: 2017-12-18
Payer: MEDICARE

## 2017-12-18 VITALS
BODY MASS INDEX: 26.57 KG/M2 | DIASTOLIC BLOOD PRESSURE: 66 MMHG | RESPIRATION RATE: 16 BRPM | WEIGHT: 150 LBS | TEMPERATURE: 98.6 F | SYSTOLIC BLOOD PRESSURE: 126 MMHG | HEART RATE: 88 BPM | OXYGEN SATURATION: 99 %

## 2017-12-18 DIAGNOSIS — M21.41 PES PLANUS OF BOTH FEET: ICD-10-CM

## 2017-12-18 DIAGNOSIS — M79.671 BILATERAL FOOT PAIN: Primary | ICD-10-CM

## 2017-12-18 DIAGNOSIS — M21.42 PES PLANUS OF BOTH FEET: ICD-10-CM

## 2017-12-18 DIAGNOSIS — M79.672 BILATERAL FOOT PAIN: Primary | ICD-10-CM

## 2017-12-18 PROCEDURE — 99213 OFFICE O/P EST LOW 20 MIN: CPT | Performed by: INTERNAL MEDICINE

## 2017-12-18 ASSESSMENT — PAIN SCALES - GENERAL: PAINLEVEL: NO PAIN (0)

## 2017-12-18 NOTE — MR AVS SNAPSHOT
After Visit Summary   12/18/2017    Monik Santiago    MRN: [id number]           Patient Information     Date Of Birth          1940        Visit Information        Provider Department      12/18/2017 9:45 AM Ken Mar MD [facility]        Today's Diagnoses     Bilateral foot pain    -  1       Follow-ups after your visit        Additional Services     PODIATRY/FOOT & ANKLE SURGERY REFERRAL       Your provider [place] you [place] [facility] [phone]   [email or web address]    Please be aware that coverage of these services is subject to the terms and limitations of your health insurance plan.  Call member services at your health plan with any benefit or coverage questions.      Please bring the following to your appointment:  >>   Any x-rays, CTs or MRIs which have been performed.  Contact the facility where they were done to arrange for  prior to your scheduled appointment.    >>   List of current medications   >>   This referral request   >>   Any documents/labs given to you for this referral                  Your next 10 appointments already scheduled     Jan 04, 2018 11:30 AM CST   New Visit with Chinedu Thorne DPM   [facility]    [address] [address] [phone]            Caleb 15, 2018 10:00 AM CST   Office Visit with Elida Pruett MD   [facility]    [address] [id number] [phone]           Bring a current list of meds and any records pertaining to this visit. For Physicals, please bring immunization records and any forms needing to be filled out. Please arrive 10 minutes early to complete paperwork.              Who to contact     If you have questions or need follow up information about today's clinic visit or your schedule please contact [place]  "[facility] directly at [phone].  Normal or non-critical lab and imaging results will be communicated to you by MyChart, letter or phone within 4 business days after the clinic has received the results. If you do not hear from us within 7 days, please contact the clinic through [employer] or phone. If you have a critical or abnormal lab result, we will notify you by phone as soon as possible.  Submit refill requests through [employer] or call your pharmacy and they will forward the refill request to us. Please allow 3 business days for your refill to be completed.          Additional Information About Your Visit        [employer] Information     [employer] lets you send messages to your doctor, view your test results, renew your prescriptions, schedule appointments and more. To sign up, go to [email or web address] . Click on \"Log in\" on the left side of the screen, which will take you to the Welcome page. Then click on \"Sign up Now\" on the right side of the page.     You will be asked to enter the access code listed below, as well as some personal information. Please follow the directions to create your username and password.     Your access code is: [email or web address]  Expires: 2018  2:36 PM     Your access code will  in 90 days. If you need help or a new code, please call your [place] clinic or [phone].        Care EveryWhere ID     This is your Care EveryWhere ID. This could be used by other organizations to access your [place] medical records  [id number]        Your Vitals Were     Pulse Temperature Respirations Pulse Oximetry BMI (Body Mass Index)       88 98.6  F (37  C) (Temporal) 16 99% 26.57 kg/m2        Blood Pressure from Last 3 Encounters:   17 126/66   17 120/60   10/19/17 136/64    Weight from Last 3 Encounters:   17 150 lb (68 kg)   17 152 lb 8 oz (69.2 kg)   10/19/17 148 lb 8 oz (67.4 kg)              We Performed the Following     PODIATRY/FOOT & ANKLE SURGERY " REFERRAL        Primary Care Provider Office Phone # Fax #    Ken Mar [phone]       [facility] [address] [place] [address]        Equal Access to Services     DANIEL SMITH : Hadii aad ku hadleonardoo Soomaali, waaxda luqadaha, qaybta kaalmada adeegyada, francisca nairchip douglas. So [facility] [phone].    ATENCIÓN: Si habla español, tiene a hamm disposición servicios gratuitos de asistencia lingüística. Llame al [phone].    We comply with applicable federal civil rights laws and [place] laws. We do not discriminate on the basis of race, color, national origin, age, disability, sex, sexual orientation, or gender identity.            Thank you!     Thank you for choosing [facility]  for your care. Our goal is always to provide you with excellent care. Hearing back from our patients is one way we can continue to improve our services. Please take a few minutes to complete the written survey that you may receive in the mail after your visit with us. Thank you!             Your Updated Medication List - Protect others around you: Learn how to safely use, store and throw away your medicines at www.disposemymeds.org.          This list is accurate as of: 12/18/17 10:32 AM.  Always use your most recent med list.                   Brand Name Dispense Instructions for use Diagnosis    ACETAMINOPHEN PO      Take 500-1,000 mg by mouth every 8 hours as needed for pain        amLODIPine 5 MG tablet    NORVASC    30 tablet    Take 1 tablet (5 mg) by mouth daily    Hypertension goal BP (blood pressure) < 140/90       ASPIRIN PO      Take 81 mg by mouth daily        atorvastatin 40 MG tablet    LIPITOR    90 tablet    Take 1 tablet (40 mg) by mouth daily    ASCVD (arteriosclerotic cardiovascular disease), Hyperlipidemia LDL goal <70       clobetasol 0.05 % ointment    TEMOVATE    30 g    Apply sparingly to affected area for two weeks, then 2-3 times  per week.    Lichen sclerosus       DULoxetine 20 MG EC capsule    CYMBALTA    180 capsule    Take 1 capsule (20 mg) by mouth 2 times daily    Adjustment disorder with mixed anxiety and depressed mood       furosemide 20 MG tablet    LASIX    90 tablet    Take 1 tablet (20 mg) by mouth daily    Swelling of lower limb       lisinopril 20 MG tablet    PRINIVIL/ZESTRIL    60 tablet    TAKE 1 TABLET (20 MG) BY MOUTH 2 TIMES DAILY    Essential hypertension with goal blood pressure less than 140/90       loperamide 2 MG capsule    IMODIUM     Take 2 mg by mouth 4 times daily as needed for diarrhea        Lysine 500 MG Tabs      Take 1 tablet by mouth daily as needed        metoprolol 25 MG tablet    LOPRESSOR    180 tablet    Take 1 tablet (25 mg) by mouth 2 times daily    Essential hypertension with goal blood pressure less than 140/90       MULTIVITAMIN ADULT Chew      Take 2 chew tab by mouth daily        NITROGLYCERIN SL      Place 0.4 mg under the tongue        PROBIOTIC DAILY PO      Take 1 capsule by mouth daily

## 2017-12-18 NOTE — NURSING NOTE
"Chief Complaint   Patient presents with     Foot Problems       Initial /66  Pulse 88  Temp 98.6  F (37  C) (Temporal)  Resp 16  Wt 150 lb (68 kg)  SpO2 99%  BMI 26.57 kg/m2 Estimated body mass index is 26.57 kg/(m^2) as calculated from the following:    Height as of 10/3/17: 5' 3\" (1.6 m).    Weight as of this encounter: 150 lb (68 kg).  Medication Reconciliation: complete    "

## 2017-12-18 NOTE — PROGRESS NOTES
SUBJECTIVE:   Monik Santiago is a 77 year old female who presents to clinic today for the following health issues:      Chief Complaint   Patient presents with     Foot Problems     Patient comes into the clinic today with her daughter.  She is having foot pain bilaterally.  She does have very flat internally rotated feet.  She has seen podiatry in 2012.  He does not wear the orthotics that were prescribed as they cause more pain.  He has a large bony prominence of callus she reports.    Past Medical History:   Diagnosis Date     Clostridium difficile diarrhea      Coronary artery disease      Generalized anxiety disorder      GERD (gastroesophageal reflux disease)      History of MRI of brain and brain stem 4/10/2015    MR BRAIN FOR STROKE COMPLETE W/O & W CONTRAST 4/9/2015 9:34 PM MRI of the brain without and with contrast MRA of the head without contrast MRA of the neck without and with contrast History: lisaal for PRESS, Comparison: 3/19/2015,  Technique:  Brain: Axial diffusion-weighted with ADC map, T2-weighted with fat saturation, T1-weighted and turboFLAIR and coronal T1-weighted images of the brain were obt     Myocardial infarction     NSTEMI     Other alteration of consciousness 7/2007    see neuro consult 7/25/2007. MinCep THOMSON was neg. Does not have seizures.      Other and unspecified hyperlipidemia      Syncope 10/19/2009    related to BP medications     Unspecified essential hypertension      Physical Exam  /66  Pulse 88  Temp 98.6  F (37  C) (Temporal)  Resp 16  Wt 150 lb (68 kg)  SpO2 99%  BMI 26.57 kg/m2  General Appearance-healthy, alert, no distress.he   Flat feet with arch that appears to be dissolved.  Bony prominence in the medial aspect with a callus over it bilaterally right worse than left.    ASSESSMENT:  Patient who is 77 years old.  She has bilateral flat feet.  She has a severe inversion and bony protrusion.  She has a callus over this on both sides of her feet.  She has  previously seen podiatry in 2012 was treated with orthotics.  She does not wear the orthotics because they cause pain.  We will get her back to see podiatry now.  I think a surgical correction would be very difficult but hopefully bracing can give her some mild relief.  I did try to give her realistic expectations and that this will always give her some issues with pain.

## 2017-12-19 RX ORDER — AMLODIPINE BESYLATE 5 MG/1
TABLET ORAL
Qty: 30 TABLET | Refills: 9 | Status: SHIPPED | OUTPATIENT
Start: 2017-12-19 | End: 2018-09-14

## 2018-01-04 ENCOUNTER — OFFICE VISIT (OUTPATIENT)
Dept: PODIATRY | Facility: CLINIC | Age: 78
End: 2018-01-04
Payer: MEDICARE

## 2018-01-04 ENCOUNTER — RADIANT APPOINTMENT (OUTPATIENT)
Dept: GENERAL RADIOLOGY | Facility: CLINIC | Age: 78
End: 2018-01-04
Attending: PODIATRIST
Payer: MEDICARE

## 2018-01-04 VITALS — TEMPERATURE: 97.9 F | HEIGHT: 63 IN | BODY MASS INDEX: 26.75 KG/M2 | WEIGHT: 151 LBS

## 2018-01-04 DIAGNOSIS — M21.41 ACQUIRED PES PLANUS OF BOTH FEET: Primary | ICD-10-CM

## 2018-01-04 DIAGNOSIS — M21.41 ACQUIRED PES PLANUS OF BOTH FEET: ICD-10-CM

## 2018-01-04 DIAGNOSIS — M21.42 ACQUIRED PES PLANUS OF BOTH FEET: Primary | ICD-10-CM

## 2018-01-04 DIAGNOSIS — M21.42 ACQUIRED PES PLANUS OF BOTH FEET: ICD-10-CM

## 2018-01-04 PROCEDURE — 73630 X-RAY EXAM OF FOOT: CPT | Mod: TC

## 2018-01-04 PROCEDURE — 99203 OFFICE O/P NEW LOW 30 MIN: CPT | Performed by: PODIATRIST

## 2018-01-04 ASSESSMENT — PAIN SCALES - GENERAL: PAINLEVEL: WORST PAIN (10)

## 2018-01-04 NOTE — MR AVS SNAPSHOT
After Visit Summary   1/4/2018    Monik Santiago    MRN: [id number]           Patient Information     Date Of Birth          1940        Visit Information        Provider Department      1/4/2018 11:30 AM Chinedu Thorne DPM [facility] Diagnoses     Acquired pes planus of both feet    -  1      Care Instructions    Reliable shoe stores: To maximize your experience and provide the best possible fit.  Be sure to show them your foot concerns and tell them Dr. Thorne sent you.      Stores listed in bold have only athletic shoes, and stores that are not bold are mostly casual or variety of shoes    [employer]  [address] [place] [address]  [phone]    [employer] [place]  [address] [place] [address]  [phone]    [employer] [place]  [address] [place] [address]  [phone]    [employer]  [address] [facility]  [facility] [address]  [phone]    [employer]  [address] [place] [address]  [phone]    [employer]  [address] [place] [address]  [phone]                         [employer] Locations:     [address] [place] [address]   [phone]     [address] [place] [address] [address] [place] [address]   [phone]     [address] [place] [address]   [phone]     [address] [place] [address]   [phone]     [address] [place] [address]   [phone]     [address] [place] [address]   [phone]     [address]  [facility] [address]   [place] [address]   [phone]     [address]   [place], [place] [address] [phone]             How to find reasonable shoes          The correct width    Correct Fitting    Correct Length      Foot Distortion    Posture Distortion                          Torsional Rigidity      Grasp behind the heel and underneath the foot and twist       Bad    Excessive torsion/twist in midfoot     Less torsion/twist in midfoot is better                   Heel Counter Rigidity      Grasp just above   midsole and squeeze      Bad    Soft heel counter      Good    Rigid Heel Counter      Flexion Rigidity      Grasp shoe and bend from forefoot to rearfoot                      Follow-ups after your visit        Your next 10 appointments already scheduled     Caleb 15, 2018 10:00 AM CST   Office Visit with Elida Pruett MD   [facility] ([facility]    [address] [facility] [id number] [phone]           Bring a current list of meds and any records pertaining to this visit. For Physicals, please bring immunization records and any forms needing to be filled out. Please arrive 10 minutes early to complete paperwork.            Feb 07, 2018  1:15 PM CST   Return Visit with Chinedu Thorne DPM   [facility] ([facility])    [address] [facility] [id number] [phone]              Who to contact     If you have questions or need follow up information about today's clinic visit or your schedule please contact [facility] directly at [phone].  Normal or non-critical lab and imaging results will be communicated to you by Panonohart, letter or phone within 4 business days after the clinic has received the results. If you do not hear from us within 7 days, please contact the clinic through Panonohart or phone. If you have a critical or abnormal lab result, we will notify you by phone as soon as possible.  Submit refill requests through Corrigo or call your pharmacy and they will forward the refill request to us. Please allow 3 business days for your refill to be completed.          Additional Information About Your Visit        PanonoharFastCAP Information     Corrigo lets you send messages to your doctor, view your test results, renew your prescriptions, schedule appointments  "and more. To sign up, go to [email or web address] . Click on \"Log in\" on the left side of the screen, which will take you to the Welcome page. Then click on \"Sign up Now\" on the right side of the page.     You will be asked to enter the access code listed below, as well as some personal information. Please follow the directions to create your username and password.     Your access code is: [email or web address]  Expires: 2018  2:36 PM     Your access code will  in 90 days. If you need help or a new code, please call your [facility] clinic or [phone].        Care EveryWhere ID     This is your Care EveryWhere ID. This could be used by other organizations to access your [facility] medical records  [id number]        Your Vitals Were     Temperature Height BMI (Body Mass Index)             97.9  F (36.6  C) (Temporal) 5' 2.6\" (1.59 m) 27.09 kg/m2          Blood Pressure from Last 3 Encounters:   17 126/66   17 120/60   10/19/17 136/64    Weight from Last 3 Encounters:   18 151 lb (68.5 kg)   17 150 lb (68 kg)   17 152 lb 8 oz (69.2 kg)               Primary Care Provider Office Phone # Fax #    Ken Mar [phone]       [facility] [address] [place] [address]        Equal Access to Services     [facility] AH: Hadii aad ku hadasho Soomaali, waaxda luqadaha, qaybta kaalmada adeegyada, waxay kathy douglas. So [facility] [phone].    ATENCIÓN: Si habla español, tiene a hamm disposición servicios gratuitos de asistencia lingüística. Llame al [phone].    We comply with applicable federal civil rights laws and [place] laws. We do not discriminate on the basis of race, color, national origin, age, disability, sex, sexual orientation, or gender identity.            Thank you!     Thank you for choosing [facility]  for your care. Our goal is always to provide you with excellent care. Hearing back from our " patients is one way we can continue to improve our services. Please take a few minutes to complete the written survey that you may receive in the mail after your visit with us. Thank you!             Your Updated Medication List - Protect others around you: Learn how to safely use, store and throw away your medicines at www.disposemymeds.org.          This list is accurate as of: 1/4/18 12:43 PM.  Always use your most recent med list.                   Brand Name Dispense Instructions for use Diagnosis    ACETAMINOPHEN PO      Take 500-1,000 mg by mouth every 8 hours as needed for pain        amLODIPine 5 MG tablet    NORVASC    30 tablet    TAKE 1 TABLET BY MOUTH EVERY DAY    Hypertension goal BP (blood pressure) < 140/90       ASPIRIN PO      Take 81 mg by mouth daily        atorvastatin 40 MG tablet    LIPITOR    90 tablet    Take 1 tablet (40 mg) by mouth daily    ASCVD (arteriosclerotic cardiovascular disease), Hyperlipidemia LDL goal <70       clobetasol 0.05 % ointment    TEMOVATE    30 g    Apply sparingly to affected area for two weeks, then 2-3 times per week.    Lichen sclerosus       DULoxetine 20 MG EC capsule    CYMBALTA    180 capsule    Take 1 capsule (20 mg) by mouth 2 times daily    Adjustment disorder with mixed anxiety and depressed mood       furosemide 20 MG tablet    LASIX    90 tablet    Take 1 tablet (20 mg) by mouth daily    Swelling of lower limb       lisinopril 20 MG tablet    PRINIVIL/ZESTRIL    60 tablet    TAKE 1 TABLET (20 MG) BY MOUTH 2 TIMES DAILY    Essential hypertension with goal blood pressure less than 140/90       loperamide 2 MG capsule    IMODIUM     Take 2 mg by mouth 4 times daily as needed for diarrhea        Lysine 500 MG Tabs      Take 1 tablet by mouth daily as needed        metoprolol 25 MG tablet    LOPRESSOR    180 tablet    Take 1 tablet (25 mg) by mouth 2 times daily    Essential hypertension with goal blood pressure less than 140/90       MULTIVITAMIN ADULT Chew       Take 2 chew tab by mouth daily        NITROGLYCERIN SL      Place 0.4 mg under the tongue        PROBIOTIC DAILY PO      Take 1 capsule by mouth daily

## 2018-01-04 NOTE — LETTER
1/4/2018         RE: Monik Santiago  [address] [facility] [address]        Dear Colleague,    Thank you for referring your patient, Monik Santiago, to the [facility]. Please see a copy of my visit note below.    HPI:  Monik Santiago is a 77 year old female who is seen in consultation at the request of Ken Mar MD.    Pt presents for eval of:   (Onset, Location, L/R, Character, Treatments, Injury if yes)    XR Left and Right foot today, 1/4/2018     1. Onset Summer 2017, callus medial Left and Right foot > Left.   With pressure sharp, stabbing, pain 10  No barefoot walking  2. Flat feet  3. Left and Right ankle pain    2012 custom orthotics fabricated at [place], but does not wear them causes her feet to hurt w/swelling when she got them.    Retired.    BMI is normal.    ROS:  10 point ROS neg other than the symptoms noted above in the HPI.    PAST MEDICAL HISTORY:   Past Medical History:   Diagnosis Date     Clostridium difficile diarrhea      Coronary artery disease      Generalized anxiety disorder      GERD (gastroesophageal reflux disease)      History of MRI of brain and brain stem 4/10/2015    MR BRAIN FOR STROKE COMPLETE W/O & W CONTRAST 4/9/2015 9:34 PM MRI of the brain without and with contrast MRA of the head without contrast MRA of the neck without and with contrast History: eval for PRESS, Comparison: 3/19/2015,  Technique:  Brain: Axial diffusion-weighted with ADC map, T2-weighted with fat saturation, T1-weighted and turboFLAIR and coronal T1-weighted images of the brain were obt     Myocardial infarction     NSTEMI     Other alteration of consciousness 7/2007    see neuro consult 7/25/2007. MinCep THOMSON was neg. Does not have seizures.      Other and unspecified hyperlipidemia      Syncope 10/19/2009    related to BP medications     Unspecified essential hypertension         PAST SURGICAL HISTORY:   Past Surgical History:   Procedure Laterality Date     C NONSPECIFIC  PROCEDURE      abd hernia repair     C TOTAL KNEE ARTHROPLASTY  08/23/10    Right knee     Cardiac stents       COLONOSCOPY N/A 2/17/2016    Procedure: COMBINED COLONOSCOPY, SINGLE OR MULTIPLE BIOPSY/POLYPECTOMY BY BIOPSY;  Surgeon: Jose Gan MD;  Location: [facility]     ESOPHAGOSCOPY, GASTROSCOPY, DUODENOSCOPY (EGD), COMBINED N/A 12/17/2014    Procedure: COMBINED ESOPHAGOSCOPY, GASTROSCOPY, DUODENOSCOPY (EGD);  Surgeon: Kaz Mccoy MD;  Location: [facility]     HC REMV CATARACT EXTRACAP,INSERT LENS,COMP      darrian     HC UGI ENDOSCOPY DIAG W BIOPSY  12/16/09     HC YAG LASER CAPSULOTOMY  9/17/2009    Right eye     HEART CATH, ANGIOPLASTY  10/03/2012    Stent of LAD     HEART CATH, ANGIOPLASTY  05/17/2014     CAD, patent stent of LAD     LAPAROSCOPIC CHOLECYSTECTOMY N/A 12/3/2016    Procedure: LAPAROSCOPIC CHOLECYSTECTOMY;  Surgeon: Viral Meyers MD;  [facility]     LAPAROSCOPIC HERNIORRHAPHY HIATAL N/A 1/30/2015    Procedure: LAPAROSCOPIC HERNIORRHAPHY HIATAL;  Surgeon: Chase Camara MD;  [facility]     LAPAROSCOPIC NISSEN FUNDOPLICATION N/A 1/30/2015    Procedure: LAPAROSCOPIC NISSEN FUNDOPLICATION;  Surgeon: Chase Camara MD;  [facility]: [facility]     MOHS MICROGRAPHIC PROCEDURE  09/14/11    left upper forehead-09/14/11        MEDICATIONS:   Current Outpatient Prescriptions:      amLODIPine (NORVASC) 5 MG tablet, TAKE 1 TABLET BY MOUTH EVERY DAY, Disp: 30 tablet, Rfl: 9     clobetasol (TEMOVATE) 0.05 % ointment, Apply sparingly to affected area for two weeks, then 2-3 times per week., Disp: 30 g, Rfl: 2     lisinopril (PRINIVIL/ZESTRIL) 20 MG tablet, TAKE 1 TABLET (20 MG) BY MOUTH 2 TIMES DAILY, Disp: 60 tablet, Rfl: 8     furosemide (LASIX) 20 MG tablet, Take 1 tablet (20 mg) by mouth daily, Disp: 90 tablet, Rfl: 1     Multiple Vitamins-Minerals (MULTIVITAMIN ADULT) CHEW, Take 2 chew tab by mouth daily, Disp: , Rfl:      loperamide (IMODIUM) 2 MG capsule, Take 2 mg by mouth  "4 times daily as needed for diarrhea, Disp: , Rfl:      Probiotic Product (PROBIOTIC DAILY PO), Take 1 capsule by mouth daily, Disp: , Rfl:      DULoxetine (CYMBALTA) 20 MG EC capsule, Take 1 capsule (20 mg) by mouth 2 times daily, Disp: 180 capsule, Rfl: 3     metoprolol (LOPRESSOR) 25 MG tablet, Take 1 tablet (25 mg) by mouth 2 times daily, Disp: 180 tablet, Rfl: 3     atorvastatin (LIPITOR) 40 MG tablet, Take 1 tablet (40 mg) by mouth daily, Disp: 90 tablet, Rfl: 3     ASPIRIN PO, Take 81 mg by mouth daily, Disp: , Rfl:      ACETAMINOPHEN PO, Take 500-1,000 mg by mouth every 8 hours as needed for pain, Disp: , Rfl:      Lysine 500 MG TABS, Take 1 tablet by mouth daily as needed , Disp: , Rfl:      NITROGLYCERIN SL, Place 0.4 mg under the tongue, Disp: , Rfl:      ALLERGIES:    Allergies   Allergen Reactions     Codeine Fatigue     Latex      Lidocaine Other (See Comments)     was one of 3 drugs given during GA that caused difficulty with anesthesia reversal     Sulfa Drugs Hives     Trimethoprim Hives     Valium Fatigue        SOCIAL HISTORY:   Social History     Social History     Marital status:      Spouse name: N/A     Number of children: N/A     Years of education: N/A     Occupational History     retired      Social History Main Topics     Smoking status: Never Smoker     Smokeless tobacco: Never Used     Alcohol use No     Drug use: No     Sexual activity: No     Other Topics Concern     Not on file     Social History Narrative    Lives with daughters family since June 2007. Moved from [place].        FAMILY HISTORY:   Family History   Problem Relation Age of Onset     Cardiovascular Father      Cardiovascular Brother      Cardiovascular Mother      Hypertension Mother      Lipids Mother      Cardiovascular Sister      stents x 2-3     Hypertension Sister      Cardiovascular Sister      MI 64     DIABETES No family hx of         EXAM:Vitals: Temp 97.9  F (36.6  C) (Temporal)  Ht 5' 2.6\" (1.59 m)  " Wt 151 lb (68.5 kg)  BMI 27.09 kg/m2  BMI= Body mass index is 27.09 kg/(m^2).    General appearance: Patient is alert and fully cooperative with history & exam.  No sign of distress is noted during the visit.     Psychiatric: Affect is pleasant & appropriate.  Patient appears motivated to improve health.     Respiratory: Breathing is regular & unlabored while sitting.     HEENT: Hearing is intact to spoken word.  Speech is clear.  No gross evidence of visual impairment that would impact ambulation.     Vascular: DP & PT pulses are intact & regular bilaterally.       Neurologic: Lower extremity sensation is intact to light touch.  No evidence of weakness or contracture in the lower extremities.  No evidence of neuropathy.    Dermatologic: Skin is intact to both lower extremities with adequate texture, turgor and tone about the integument.  No paronychia or evidence of soft tissue infection is noted.     Musculoskeletal: Patient is ambulatory without assistive device or brace.  Severe triplanar valgus deformity is noted about bilateral feet.  There is hyperkeratosis at the navicular head left much greater than right however no hematogenous exudate is noted.  This is rigid deformity partially reducible.  Equinus noted as well when attempting to reduce the deformity.    Radiographs: Generalized osteopenia noted throughout both radiographs with dislocation of the talus navicular joint.  Talus is near vertical with severe collapse of the medial column severe pes valgus and abnormal morphology of bones of the midfoot and rear foot.     ASSESSMENT:       ICD-10-CM    1. Acquired pes planus of both feet M21.41 XR Foot Bilateral G/E 3 Views    M21.42         PLAN:  [facility]      1/4/2018   Interpreted radiographs  This is severe pes valgus and bilateral.  Conservative options include custom molded orthotics and much more aggressive shoe gear versus AFO such as ankle gauntlet with Velcro strap and  then progressed to surgical options.  She is not a strong surgical candidate and this would require months of nonweightbearing and is bilateral.  After discussing these options with the patient and daughter who presents with her today they would like to progress through conservative treatment options.  Her current custom molded orthotic conforms quite well but is quite flexible in her shoe gear, slippers, would not be adequate.  We discussed utilizing OTC shoes and appropriate fillers and written instructions were dispensed and if this is not satisfying or reasonable for the patient we will then consider molding AFO rigo and then obtain orthopedic shoes for her.  Follow-up with me in about 1 month.    Patient and daughter agree this is the best plan for now.    Chinedu Thorne DPM        Again, thank you for allowing me to participate in the care of your patient.        Sincerely,        Chinedu Thorne DPM

## 2018-01-04 NOTE — NURSING NOTE
"Chief Complaint   Patient presents with     Consult     B/L flat foot and callus; new pt       Initial Temp 97.9  F (36.6  C) (Temporal)  Ht 5' 2.6\" (1.59 m)  Wt 151 lb (68.5 kg)  BMI 27.09 kg/m2 Estimated body mass index is 27.09 kg/(m^2) as calculated from the following:    Height as of this encounter: 5' 2.6\" (1.59 m).    Weight as of this encounter: 151 lb (68.5 kg).  BP completed using cuff size: NA (Not Taken)  Medication Reconciliation: complete    Florence Ortega CMA, January 4, 2018    "

## 2018-01-04 NOTE — PATIENT INSTRUCTIONS
Reliable shoe stores: To maximize your experience and provide the best possible fit.  Be sure to show them your foot concerns and tell them Dr. Thorne sent you.      Stores listed in bold have only athletic shoes, and stores that are not bold are mostly casual or variety of shoes    [employer]  [address] [place] [address]  [phone]    [employer] [place]  [address] [place] [address]  [phone]     [employer] [place]  [address] [place] [address]  [phone]    [employer]  [address] [facility] [address]  [phone]    [employer]  [address] [place], [place] [address] [phone]    [employer]  [address] [place] [address]  [phone]                         [employer] Locations:     [address] [place] [address]   [phone]     [address] [place] [address] [address] [place] [address]   [phone]     [address] [place] [address]   [phone]     [address] [facility] [place] [address]   [phone]     [address] [place] [address]   [phone]     [address] [place] [address]   [phone]     [address] [facility] [address]   [place] [address]   [phone]     [address] [facility]   [place] [address]   [phone]             How to find reasonable shoes          The correct width    Correct Fitting    Correct Length      Foot Distortion    Posture Distortion                          Torsional Rigidity      Grasp behind the heel and underneath the foot and twist      Bad    Excessive torsion/twist in midfoot     Less torsion/twist in midfoot is better                   Heel Counter Rigidity      Grasp just above   midsole and squeeze      Bad    Soft heel counter      Good    Rigid Heel Counter      Flexion Rigidity      Grasp shoe and bend from forefoot to rearfoot

## 2018-01-04 NOTE — PROGRESS NOTES
Appropriate assistive devices provided during their visit. yes (Yes, No, N/A) can3 (list device)    Exam table and/or cart  placed in the lowest position. yes (Yes, No, N/A)    Brakes on tables/carts/wheelchairs used at all times. yes (Yes, No, N/A)    Non slip footwear applied. na (Yes, No, NA)    Patient was accompanied by staff throughout visit. yes (Yes, No, N/A)    Equipment safety straps used. na (Yes, No, N/A)    Assist with toileting. na (Yes, No, N/A)  Corine Houston  1/4/2018  11:23 AM

## 2018-01-20 DIAGNOSIS — I25.10 ASCVD (ARTERIOSCLEROTIC CARDIOVASCULAR DISEASE): ICD-10-CM

## 2018-01-20 DIAGNOSIS — E78.5 HYPERLIPIDEMIA LDL GOAL <70: ICD-10-CM

## 2018-01-22 NOTE — TELEPHONE ENCOUNTER
"Last Written Prescription Date:  11/08/2016  Last Fill Quantity: 90,  # refills: 3   Last Office Visit with [facility], P or [facility] prescribing provider:  8/22/2017   Future Office Visit:    Next 5 appointments (look out 90 days)     Jan 29, 2018  4:00 PM CST   [facility] Elida [facility]ett MD   [facility]    [address] [facility] [address]   [phone]            Feb 07, 2018  1:15 PM CST   [facility] Visit with [facility]inedu [facility]orne D[facility]    [address] [facility] [id number] [phone]                 Requested Prescriptions   Pending Prescriptions Disp Refills     atorvastatin (LIPITOR) 40 MG tablet [Pharmacy Med Name: ATORVASTATIN 40 MG TABLET] 90 tablet 3     Sig: TAKE 1 TABLET (40 MG) BY MOUTH DAILY    Statins Protocol Failed    1/20/2018 12:26 AM       Failed - LDL on file in past 12 months    Recent Labs   Lab Test  01/25/16   1035   LDL  45            Passed - No abnormal creatine kinase in past 12 months    No lab results found.         Passed - Recent or future visit with authorizing provider    Patient had office visit in the last year or has a visit in the next 30 days with authorizing provider.  See \"Patient Info\" tab in inbasket, or \"Choose Columns\" in Meds & Orders section of the refill encounter.            Passed - Patient is age 18 or older       Passed - No active pregnancy on record       Passed - No positive pregnancy test in past 12 months          "

## 2018-01-23 DIAGNOSIS — I25.10 ASCVD (ARTERIOSCLEROTIC CARDIOVASCULAR DISEASE): ICD-10-CM

## 2018-01-23 DIAGNOSIS — F43.23 ADJUSTMENT DISORDER WITH MIXED ANXIETY AND DEPRESSED MOOD: ICD-10-CM

## 2018-01-23 DIAGNOSIS — R07.89 CHEST PAIN, ATYPICAL: Primary | ICD-10-CM

## 2018-01-23 DIAGNOSIS — I10 HYPERTENSION GOAL BP (BLOOD PRESSURE) < 140/90: ICD-10-CM

## 2018-01-23 RX ORDER — DULOXETIN HYDROCHLORIDE 20 MG/1
CAPSULE, DELAYED RELEASE ORAL
Qty: 180 CAPSULE | Refills: 3 | Status: SHIPPED | OUTPATIENT
Start: 2018-01-23 | End: 2019-01-28

## 2018-01-23 RX ORDER — ATORVASTATIN CALCIUM 40 MG/1
TABLET, FILM COATED ORAL
Qty: 90 TABLET | Refills: 0 | Status: SHIPPED | OUTPATIENT
Start: 2018-01-23 | End: 2018-04-20

## 2018-01-23 NOTE — TELEPHONE ENCOUNTER
Medication is being filled for 1 time refill only due to:  Patient needs labs fasting lipid check. Future labs ordered  as listed. Notified per comment section of Rx due for fasting lipid. .....JUSTIN Carr

## 2018-01-23 NOTE — TELEPHONE ENCOUNTER
Patient was told by pharmacy that she is due for lab work before she gets this filled again. There are no orders for her to have this done. Please place orders and call back to assist with scheduling this appt. She can be reached at [phone].  Thank you,  Nighat Giron   for [facility]

## 2018-01-23 NOTE — TELEPHONE ENCOUNTER
Left message for patient to call back and speak with any .    Thank you,  Nighat Giron   for [facility]

## 2018-01-23 NOTE — TELEPHONE ENCOUNTER
"Requested Prescriptions   Pending Prescriptions Disp Refills     DULoxetine (CYMBALTA) 20 MG EC capsule [Pharmacy Med Name: DULOXETINE HCL DR 20 MG CAP] 180 capsule 3     Sig: TAKE 1 CAPSULE (20 MG) BY MOUTH 2 TIMES DAILY    Serotonin-Norepinephrine Reuptake Inhibitors  Passed    1/23/2018 12:27 PM       Passed - Blood pressure under 140/90    BP Readings from Last 3 Encounters:   12/18/17 126/66   11/13/17 120/60   10/19/17 136/64                Passed - Recent or future visit with authorizing provider's specialty    Patient had office visit in the last year or has a visit in the next 30 days with authorizing provider.  See \"Patient Info\" tab in inbasket, or \"Choose Columns\" in Meds & Orders section of the refill encounter.            Passed - Patient is age 18 or older       Passed - No active pregnancy on record       Passed - No positive pregnancy test in past 12 months          Last Written Prescription Date:  1/23/18  Last Fill Quantity: 180,  # refills: 3   Last Office Visit with [facility] or [facility] prescribing provider:  12/18/17   Future Office Visit:    Next 5 appointments (look out 90 days)     Jan 29, 2018  4:00 PM CST   Office Visit with Elida [facility]ett MD   [facility]    [address] [facility] [address] [facility] [address] [phone]            Feb 07, 2018  1:15 PM CST   [facility]    [address] [facility] [id number] [phone]                   "

## 2018-01-29 ENCOUNTER — OFFICE VISIT (OUTPATIENT)
Dept: OBGYN | Facility: OTHER | Age: 78
End: 2018-01-29
Payer: MEDICARE

## 2018-01-29 VITALS
BODY MASS INDEX: 27.23 KG/M2 | HEART RATE: 68 BPM | SYSTOLIC BLOOD PRESSURE: 130 MMHG | WEIGHT: 151.75 LBS | DIASTOLIC BLOOD PRESSURE: 70 MMHG

## 2018-01-29 DIAGNOSIS — L90.0 LICHEN SCLEROSUS: Primary | ICD-10-CM

## 2018-01-29 PROCEDURE — 99212 OFFICE O/P EST SF 10 MIN: CPT | Performed by: OBSTETRICS & GYNECOLOGY

## 2018-01-29 NOTE — MR AVS SNAPSHOT
After Visit Summary   1/29/2018    Monik Santiago    MRN: [id number]           Patient Information     Date Of Birth          1940        Visit Information        Provider Department      1/29/2018 4:00 PM Elida Pruett MD [facility]        Today's Diagnoses     Lichen sclerosus    -  1       Follow-ups after your visit        Follow-up notes from your care team     Return in about 6 months (around 7/29/2018) for Follow up visit.      Your next 10 appointments already scheduled     Jan 30, 2018 10:00 AM CST   LAB with [facility]   [facility]    [address] [place] [id number]   [phone]           Please do not eat 10-12 hours before your appointment if you are coming in fasting for labs on lipids, cholesterol, or glucose (sugar). This does not apply to pregnant women. Water, hot tea and black coffee (with nothing added) are okay. Do not drink other fluids, diet soda or chew gum.            Feb 07, 2018  1:15 PM CST   Return Visit with Chinedu Thorne DPM   [facility]    [address] [place] [address]   [phone]            Jul 23, 2018  4:15 PM CDT   Office Visit with Elida Pruett MD   [facility]    [address] [facility] [id number] [phone]           Bring a current list of meds and any records pertaining to this visit. For Physicals, please bring immunization records and any forms needing to be filled out. Please arrive 10 minutes early to complete paperwork.              Who to contact     If you have questions or need follow up information about today's clinic visit or your schedule please contact [facility] directly at [phone].  Normal or non-critical lab and imaging results will be communicated to you by MyChart, letter or phone within 4 business days after the  "clinic has received the results. If you do not hear from us within 7 days, please contact the clinic through [employer] or phone. If you have a critical or abnormal lab result, we will notify you by phone as soon as possible.  Submit refill requests through [employer] or call your pharmacy and they will forward the refill request to us. Please allow 3 business days for your refill to be completed.          Additional Information About Your Visit        [employer] Information     [employer] lets you send messages to your doctor, view your test results, renew your prescriptions, schedule appointments and more. To sign up, go to [email or web address] . Click on \"Log in\" on the left side of the screen, which will take you to the Welcome page. Then click on \"Sign up Now\" on the right side of the page.     You will be asked to enter the access code listed below, as well as some personal information. Please follow the directions to create your username and password.     Your access code is: [email or web address]  Expires: 2018  2:36 PM     Your access code will  in 90 days. If you need help or a new code, please call your [place] clinic or [phone].        Care EveryWhere ID     This is your Care EveryWhere ID. This could be used by other organizations to access your [place] medical records  [id number]        Your Vitals Were     Pulse BMI (Body Mass Index)                68 27.23 kg/m2           Blood Pressure from Last 3 Encounters:   18 130/70   17 126/66   17 120/60    Weight from Last 3 Encounters:   18 151 lb 12 oz (68.8 kg)   18 151 lb (68.5 kg)   17 150 lb (68 kg)              Today, you had the following     No orders found for display       Primary Care Provider Office Phone # Fax #    Ken Mar [phone]       [facility] [address] [place] [address]        Equal Access to Services     DANIEL SMITH AH: Hadii leonora Alba, " waaxda lufedreicoadaha, qaybta kaalmada sb, francisca nairaajoycelyn ah. So [facility] [phone].    ATENCIÓN: Si juan pendleton, tiene a hamm disposición servicios gratuitos de asistencia lingüística. Geni al [phone].    We comply with applicable federal civil rights laws and [place] laws. We do not discriminate on the basis of race, color, national origin, age, disability, sex, sexual orientation, or gender identity.            Thank you!     Thank you for choosing [facility]  for your care. Our goal is always to provide you with excellent care. Hearing back from our patients is one way we can continue to improve our services. Please take a few minutes to complete the written survey that you may receive in the mail after your visit with us. Thank you!             Your Updated Medication List - Protect others around you: Learn how to safely use, store and throw away your medicines at www.disposemymeds.org.          This list is accurate as of 1/29/18  4:12 PM.  Always use your most recent med list.                   Brand Name Dispense Instructions for use Diagnosis    ACETAMINOPHEN PO      Take 500-1,000 mg by mouth every 8 hours as needed for pain        amLODIPine 5 MG tablet    NORVASC    30 tablet    TAKE 1 TABLET BY MOUTH EVERY DAY    Hypertension goal BP (blood pressure) < 140/90       ASPIRIN PO      Take 81 mg by mouth daily        atorvastatin 40 MG tablet    LIPITOR    90 tablet    TAKE 1 TABLET (40 MG) BY MOUTH DAILY    ASCVD (arteriosclerotic cardiovascular disease), Hyperlipidemia LDL goal <70       clobetasol 0.05 % ointment    TEMOVATE    30 g    Apply sparingly to affected area for two weeks, then 2-3 times per week.    Lichen sclerosus       DULoxetine 20 MG EC capsule    CYMBALTA    180 capsule    TAKE 1 CAPSULE (20 MG) BY MOUTH 2 TIMES DAILY    Adjustment disorder with mixed anxiety and depressed mood       furosemide 20 MG tablet    LASIX    90 tablet    Take 1  tablet (20 mg) by mouth daily    Swelling of lower limb       lisinopril 20 MG tablet    PRINIVIL/ZESTRIL    60 tablet    TAKE 1 TABLET (20 MG) BY MOUTH 2 TIMES DAILY    Essential hypertension with goal blood pressure less than 140/90       loperamide 2 MG capsule    IMODIUM     Take 2 mg by mouth 4 times daily as needed for diarrhea        Lysine 500 MG Tabs      Take 1 tablet by mouth daily as needed        metoprolol tartrate 25 MG tablet    LOPRESSOR    180 tablet    Take 1 tablet (25 mg) by mouth 2 times daily    Essential hypertension with goal blood pressure less than 140/90       MULTIVITAMIN ADULT Chew      Take 2 chew tab by mouth daily        NITROGLYCERIN SL      Place 0.4 mg under the tongue        PROBIOTIC DAILY PO      Take 1 capsule by mouth daily

## 2018-01-29 NOTE — NURSING NOTE
"Chief Complaint   Patient presents with     RECHECK     follow up labia lesion       Initial /70 (BP Location: Left arm, Patient Position: Chair, Cuff Size: Adult Regular)  Pulse 68  Wt 151 lb 12 oz (68.8 kg)  BMI 27.23 kg/m2 Estimated body mass index is 27.23 kg/(m^2) as calculated from the following:    Height as of 18: 5' 2.6\" (1.59 m).    Weight as of this encounter: 151 lb 12 oz (68.8 kg).  BP completed using cuff size: small regular        The following HM Due: NONE      The following patient reported/Care Every where data was sent to:  P ABSTRACT QUALITY INITIATIVES [90281]       N/a    Sade Tariq CMA  2018           "

## 2018-01-29 NOTE — PROGRESS NOTES
OB/GYN  2017      NAME:  Monik Santiago  PCP:  Ken Mar  MRN:  [id number]    Impression / Plan     77 year old  with:      ICD-10-CM    1. Lichen sclerosus L90.0        She will continue to use the clobetasol 2-3 times per week as previously instructed.  She will follow up sooner as needed.  Reviewed instructions.   She will use replens and petroleum jelly as needed for vaginal dryness.  We may need to consider topical estrace cream.   She had no further questions.      Chief Complaint     Chief Complaint   Patient presents with     RECHECK     follow up labia lesion       HPI     Monik Santiago is a  77 year old female who is seen for follow up.    Biopsy was done 10/19/17 for labial lesion and demonstrated lichen sclerosis.   FINAL DIAGNOSIS:   Labia biopsy, right majora/minora junction, 9:00:   - Lichen sclerosus.     She continues to feel better while using clobetasol.  She has been using it every other day since I saw her last.  No itching or burning.  No vulvar pain.     She has had dryness that is bothering her.  She has tried vasoline but that does not last very long.      No LMP recorded. Patient is postmenopausal.       Problem List     Patient Active Problem List    Diagnosis Date Noted     Chest pain, atypical 2012     Priority: High     ASCVD (arteriosclerotic cardiovascular disease) 2012     Priority: High     Chest pain 2017     Priority: Medium     RUQ abdominal pain 2016     Priority: Medium     S/P laparoscopic cholecystectomy 2016     Priority: Medium     History of MRI of brain and brain stem 04/10/2015     Priority: Medium     MR BRAIN FOR STROKE COMPLETE W/O & W CONTRAST 2015 9:34 PM  MRI of the brain without and with contrast  MRA of the head without contrast  MRA of the neck without and with contrast  History: eval for PRESS,  Comparison: 3/19/2015,   Technique:   Brain: Axial diffusion-weighted with ADC map, T2-weighted with  fat  saturation, T1-weighted and turboFLAIR and coronal T1-weighted images  of the brain were obtained without intravenous contrast. Following  intravenous administration of gadolinium, axial turboFLAIR and coronal  T1-weighted images of the brain were obtained.   MRA: 3D time-of-flight MR angiography of the major arteries at the  base of the brain was performed without contrast. 2D time-of-flight  MRA without contrast with superior and inferior saturation bands and  3D T1-weighted postgadolinium MRA of the neck/cervical vessels were  also obtained. 3-dimensional reconstructions were created of the MRA  of both the head and the neck, which were reviewed by the radiologist.  Contrast: 10mL Gadavist  Findings: No areas of restricted diffusion. Marked volume loss  particularly affecting frontal, parietal and medial temporal lobe.s  Moderate confluent periventricular deep white white matter FLAIR  hyperintensities similar in appearance to previous exam. Some apparent  faint postcontrast T1 hyperintensity in the left cerebellar hemisphere  adjacent to the fourth ventricle is favored to be related to artifact.  No abnormal contrast enhancement is noted. Acute intracranial  hemorrhage or extra-axial collection. There is no hydrocephalus.  MR angiogram of the head: Posterior circulation appears patent.  Evaluation of anterior circulation is markedly limited due to motion  artifact. Bilateral intracranial internal carotid arteries are grossly  patent.  MR angiography of the neck demonstrates patent origins of the carotid  and vertebral arteries. There are codominant vertebral arteries which  are patent throughout the neck. Bilateral common carotid arteries,  carotid bifurcations and internal carotid arteries are patent.  IMPRESSION  Impression:   No acute infarction acute intracranial hemorrhage or extra-axial  collection. No hydrocephalus.  Marked parenchymal volume loss particularly affecting the frontal  convexity,  parietal lobes and bilateral medial temporal lobes.  Confluent white matter T2 hyperintensities in the periventricular deep  white matter are most compatible with moderate chronic microvascular  ischemic changes.  MR angiogram of the head is partially limited; particularly the  anterior circulation evaluation is limited.   Neck MR angiogram demonstrates no hemodynamically significant  stenosis.  I have personally reviewed the examination and initial interpretation  and I agree with the findings.  RAVINDER REYNOSO MD       Altered mental status 04/08/2015     Priority: Medium     Headache 04/08/2015     Priority: Medium     Problem list name updated by automated process. Provider to review       History of C.diff colitis 04/08/2015     Priority: Medium     Anxiety 04/08/2015     Priority: Medium     Lightheaded 03/07/2015     Priority: Medium     Lightheadedness 03/06/2015     Priority: Medium     Nausea without vomiting 02/06/2015     Priority: Medium     Problem list name updated by automated process. 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 Provider to review       Near syncope 05/20/2014     Priority: Medium     Coronary atherosclerosis of native coronary artery (VESSEL) 05/20/2014     Priority: Medium      Lichen sclerosus et atrophicus of the vulva 10/08/2013     Priority: Medium     CKD (chronic kidney disease) stage 3, GFR 30-59 ml/min 11/19/2012     Priority: Medium     Acute worsening of stage 3 chronic kidney disease 11/19/2012     Priority: Medium     Diagnosis updated by automated process. Provider to review and confirm.       NSTEMI (non-ST elevated myocardial infarction), history 10/06/2012     Priority: Medium     Acute myocardial infarction 10/05/2012     Priority: Medium     Hypoxia 10/05/2012     Priority: Medium     Hypertension goal BP (blood pressure) < 140/90 05/02/2011     Priority: Medium     Osteoarthritis 09/13/2010     Priority: Medium     GERD (gastroesophageal reflux disease)      Priority: Medium     Other alteration of consciousness 07/01/2007     Priority: Medium     see neuro consult 7/25/2007       Systolic CHF, chronic (H) 11/20/2012     Priority: Low     Anemia 11/20/2012     Priority: Low     DVT prophylaxis 11/20/2012     Priority: Low     Advanced directives, counseling/discussion 05/16/2012     Priority: Low     Advance Directive received and scanned. Click on Code in the patient header to view. 5/16/2012          Hyperlipidemia LDL goal <70 10/31/2010     Priority: Low     Generalized anxiety disorder      Priority: Low       Medications     Current Outpatient Prescriptions   Medication     atorvastatin (LIPITOR) 40 MG tablet     DULoxetine (CYMBALTA) 20 MG EC capsule     amLODIPine (NORVASC) 5 MG tablet     clobetasol (TEMOVATE) 0.05 % ointment     lisinopril (PRINIVIL/ZESTRIL) 20 MG tablet     furosemide (LASIX) 20 MG tablet     Multiple Vitamins-Minerals (MULTIVITAMIN ADULT) CHEW     loperamide (IMODIUM) 2 MG capsule     Probiotic Product (PROBIOTIC DAILY PO)     metoprolol (LOPRESSOR) 25 MG tablet     NITROGLYCERIN SL     ASPIRIN PO     ACETAMINOPHEN PO     Lysine 500 MG TABS     No current facility-administered medications for this visit.         Allergies     Allergies    Allergen Reactions     Codeine Fatigue     Latex      Lidocaine Other (See Comments)     was one of 3 drugs given during GA that caused difficulty with anesthesia reversal     Sulfa Drugs Hives     Trimethoprim Hives     Valium Fatigue       ROS     A /GI organ review of systems was asked and the pertinent positives and negatives are listed in the HPI.     Physical Exam   Vitals: /70 (BP Location: Left arm, Patient Position: Chair, Cuff Size: Adult Regular)  Pulse 68  Wt 151 lb 12 oz (68.8 kg)  BMI 27.23 kg/m2    General: Comfortable, no obvious distress, normal  body habitus  Psych: Alert and orientated x 3. Appropriate affect, good insight.   : Lesion of the right labia. There is fusion of the majora and minora at the posterior aspect of the introitus bilaterally. There is loss of architecture due to lichen sclerosus and atrophy.  She also has erosion at the posterior fourchette but this is improved.  From 9:00 to the clitoris there is hypopigmentation consistent with lichen sclerosus.  2 mm purple papules noted bilaterally.            13 minutes was spent with patient, more than 50% counseling and coordinating care    Elida Pruett MD

## 2018-01-30 DIAGNOSIS — F43.23 ADJUSTMENT DISORDER WITH MIXED ANXIETY AND DEPRESSED MOOD: ICD-10-CM

## 2018-01-30 DIAGNOSIS — R07.89 CHEST PAIN, ATYPICAL: ICD-10-CM

## 2018-01-30 DIAGNOSIS — I10 HYPERTENSION GOAL BP (BLOOD PRESSURE) < 140/90: ICD-10-CM

## 2018-01-30 DIAGNOSIS — I25.10 ASCVD (ARTERIOSCLEROTIC CARDIOVASCULAR DISEASE): ICD-10-CM

## 2018-01-30 LAB
CHOLEST SERPL-MCNC: 123 MG/DL
HDLC SERPL-MCNC: 78 MG/DL
LDLC SERPL CALC-MCNC: 29 MG/DL
NONHDLC SERPL-MCNC: 45 MG/DL
TRIGL SERPL-MCNC: 81 MG/DL

## 2018-01-30 PROCEDURE — 36415 COLL VENOUS BLD VENIPUNCTURE: CPT | Performed by: INTERNAL MEDICINE

## 2018-01-30 PROCEDURE — 80061 LIPID PANEL: CPT | Performed by: INTERNAL MEDICINE

## 2018-02-07 ENCOUNTER — OFFICE VISIT (OUTPATIENT)
Dept: PODIATRY | Facility: CLINIC | Age: 78
End: 2018-02-07
Payer: MEDICARE

## 2018-02-07 VITALS — TEMPERATURE: 97.8 F | WEIGHT: 156 LBS | BODY MASS INDEX: 27.64 KG/M2 | HEIGHT: 63 IN

## 2018-02-07 DIAGNOSIS — M21.42 ACQUIRED PES PLANUS OF BOTH FEET: Primary | ICD-10-CM

## 2018-02-07 DIAGNOSIS — M21.41 ACQUIRED PES PLANUS OF BOTH FEET: Primary | ICD-10-CM

## 2018-02-07 PROCEDURE — 99213 OFFICE O/P EST LOW 20 MIN: CPT | Performed by: PODIATRIST

## 2018-02-07 NOTE — PATIENT INSTRUCTIONS
"Nail Debridement    A high quality instrument makes trimming toenails MUCH easier.  Search [employer]bay for any 5\" nail nipper manufactured by reliable brands such as Miltex, Integra or Jarit as these quality instruments will help manage difficult nails more effectively and comfortably. We use Miltex -SS.  A physician is not necessary to trim nails even if you are taking blood thinners or are diabetic.  Your family or care givers may help manage your toenails.      Trim or sand the nails once weekly.  Do not wait until they are long and painful or trimming will become too difficult and painful and will increase your risk of complications or infection.  A course file or 120 grit sandpaper on a sanding block can be helpful.  For very thick nails many people prefer battery operated bender such as an Amope', Personal Pedi and Emjoi for regular use or heavy painful callouses or thick toenails.    Trim or skive any portion of nail that is thick, loose, crumbling, or not well attached. Do not tear the nail away, but rather cut them with a nail nipperor sand or sand them down.  You may follow up with your Podiatric Physician if you have pain, bleeding, infection, questions or other concerns.      You may also contact the following Registered Nurses for further help with nail debridement and minor hygiene concnerns.  They may come to your home or meet them at their clinic to trim your toenails and soak your feet, as well as monitor for any complications that would require evaluation by a Physician.        [facility]  Clementina Whaley RN  Office [phone]    [facility]  Yvonne Jefferson RN  [phone]   Call or text for appointment  Some home visits and has a clinic at:  [address] [place] [id number]    [facility]  [phone]  [facility]  [phone]  [email or web address]  [facility]    For up to date list and " to find foot care nurses in other communities visit [employer] website:  afcna.org.         Calluses, Corns, IPKs, Porokeratosis    When there is excessive friction or pressure on the skin, the body responds by making the skin thicker.  While this may protect the deeper structures, the thickened skin can take up more space and thus increase pressure over a bony prominence or become an open sore or skin ulcer as this skin becomes less flexible.    Flat, diffuse thickening are simple calluses and they are usually caused by friction.  Often these are the result of rubbing on a shoe or going barefoot.    Calluses with a central core between the toes are called corns.  These often result from prominent joints on adjacent toes rubbing together.  Theses are often a symptom of bone malalignment and will usually recur unless the underlying bones are addressed.    Many of these lesions can be kept comfortable with routine maintenance. This consists of filing them with a Ped Egg, callus file, or 120 grit sandpaper on a block, every day during your bath or shower.  Most people prefer battery operated bender such as an Amope', Personal Pedi and Emjoi for regular use or heavy painful callouses.  Heavy creams or ointments can be applied 1-2 times every day to keep them soft. Toe spacers can be used for corns, gel pads can be used for other lesions on the bottom of the foot. If there is a deformity noted, such as a prominent bone, often this can be addressed to minimize recurrence. However, sometimes the pressure and lesion simply migrates to another spot after surgery, so it is not a guaranteed cure.     If you have severe callouses and cracking, you may apply heavy greasy ointments that you scoop up such as Cetaphil, Eucerin, Aquaphor or Vaseline.  For more aggressive help apply heavy creams or ointment under occlusive dressings such as Saran Wrap or Jelly Feet while sleeping.   Jelly Feet can be  obtained at [email or web address].     To be successful with managing hyperkeratotic skin, you must manage hygiene daily.  At your bath or shower time is the easiest time to work on this when skin is most soft.  There is no medical or surgical treatment that will absolutely eliminate many of these symptoms.      [employer] is a reliable source for all sorts of foot pads, cushions, or interdigital spacers and foot appliances. Go to www.[employer] or request a catalog at 0-145-[employer].        Please call with any additional questions.

## 2018-02-07 NOTE — NURSING NOTE
"Chief Complaint   Patient presents with     RECHECK     Acquired pes planus of both feet       Initial Temp 97.8  F (36.6  C) (Temporal)  Ht 1.59 m (5' 2.6\")  Wt 70.8 kg (156 lb)  BMI 27.99 kg/m2 Estimated body mass index is 27.99 kg/(m^2) as calculated from the following:    Height as of this encounter: 1.59 m (5' 2.6\").    Weight as of this encounter: 70.8 kg (156 lb).  Medication Reconciliation: complete    "

## 2018-02-07 NOTE — PROGRESS NOTES
"HPI:  Monki Santiago is a 77 year old female who is seen in consultation at the request of Ken Mar MD.    Pt presents for eval of:   (Onset, Location, L/R, Character, Treatments, Injury if yes)    XR Left and Right foot today, 1/4/2018     1. Onset Summer 2017, callus medial Left and Right foot > Left.   With pressure sharp, stabbing, pain 10  No barefoot walking  2. Flat feet  3. Left and Right ankle pain    2012 custom orthotics fabricated at [place], but does not wear them causes her feet to hurt w/swelling when she got them.    Retired.    BMI is normal.    EXAM:Vitals: Temp 97.8  F (36.6  C) (Temporal)  Ht 5' 2.6\" (1.59 m)  Wt 156 lb (70.8 kg)  BMI 27.99 kg/m2  BMI= Body mass index is 27.99 kg/(m^2).    General appearance: Patient is alert and fully cooperative with history & exam.  No sign of distress is noted during the visit.     Psychiatric: Affect is pleasant & appropriate.  Patient appears motivated to improve health.     Respiratory: Breathing is regular & unlabored while sitting.     HEENT: Hearing is intact to spoken word.  Speech is clear.  No gross evidence of visual impairment that would impact ambulation.     Vascular: DP & PT pulses are intact & regular bilaterally.       Neurologic: Lower extremity sensation is intact to light touch.  No evidence of weakness or contracture in the lower extremities.  No evidence of neuropathy.    Dermatologic: Skin is intact to both lower extremities with adequate texture, turgor and tone about the integument.  No paronychia or evidence of soft tissue infection is noted.     Musculoskeletal: Patient is ambulatory without assistive device or brace.  Severe triplanar valgus deformity is noted about bilateral feet.  There is hyperkeratosis at the navicular head left much greater than right however no hematogenous exudate is noted.  This is rigid deformity partially reducible.  Equinus noted as well when attempting to reduce the deformity.    Radiographs: " Generalized osteopenia noted throughout both radiographs with dislocation of the talus navicular joint.  Talus is near vertical with severe collapse of the medial column severe pes valgus and abnormal morphology of bones of the midfoot and rear foot.     ASSESSMENT:       ICD-10-CM    1. Acquired pes planus of both feet M21.41 ORTHOTICS REFERRAL    M21.42         PLAN:  Reviewed patient[facility] in [facility].      1/4/2018   Interpreted radiographs  This is severe pes valgus and bilateral.  Conservative options include custom molded orthotics and much more aggressive shoe gear versus AFO such as ankle gauntlet with Velcro strap and then progressed to surgical options.  She is not a strong surgical candidate and this would require months of nonweightbearing and is bilateral.  After discussing these options with the patient and daughter who presents with her today they would like to progress through conservative treatment options.  Her current custom molded orthotic conforms quite well but is quite flexible in her shoe gear, slippers, would not be adequate.  We discussed utilizing OTC shoes and appropriate fillers and written instructions were dispensed and if this is not satisfying or reasonable for the patient we will then consider molding AFO gauntlet and then obtain orthopedic shoes for her.  Follow-up with me in about 1 month.    Patient and daughter agree this is the best plan for now.    2/7/2018  Patient states her old custom molded orthotics that have been modified are not very comfortable and cause her more pain.  When she utilizes her silicone gel inserts, OTC inserts, she has much less pain.  Therefore I would recommend wearing the OTC silicone gel inserts if this is satisfactory.    If this and the custom molded orthotic are not adequate support and protection to reduce overloading of the navicular prominence I would recommend AFO ankle gauntlet type brace with cut out underneath the navicular.  The goal of  this would be to stabilize the motion in her midfoot and reduce loading about the navicular.  She and daughter admits understanding options and agreed that they will continue the over-the-counter insert versus the custom molded insert and will follow-up to have the AFO molded if this is not sufficient.  Follow-up as needed    Chinedu Thorne DPM

## 2018-02-07 NOTE — MR AVS SNAPSHOT
"              After Visit Summary   2/7/2018    Monik Santiago    MRN: [id number]           Patient Information     Date Of Birth          1940        Visit Information        Provider Department      2/7/2018 1:15 PM Chinedu Thorne DPM [facility]        Today's Diagnoses     Acquired pes planus of both feet    -  1      Care Instructions    Nail Debridement    A high quality instrument makes trimming toenails MUCH easier.  Search ebay for any 5\" nail nipper manufactured by reliable brands such as Miltex, Integra or Jarit as these quality instruments will help manage difficult nails more effectively and comfortably. We use Miltex -SS.  A physician is not necessary to trim nails even if you are taking blood thinners or are diabetic.  Your family or care givers may help manage your toenails.      Trim or sand the nails once weekly.  Do not wait until they are long and painful or trimming will become too difficult and painful and will increase your risk of complications or infection.  A course file or 120 grit sandpaper on a sanding block can be helpful.  For very thick nails many people prefer battery operated bender such as an Amope', Personal Pedi and Emjoi for regular use or heavy painful callouses or thick toenails.    Trim or skive any portion of nail that is thick, loose, crumbling, or not well attached. Do not tear the nail away, but rather cut them with a nail nipperor sand or sand them down.  You may follow up with your Podiatric Physician if you have pain, bleeding, infection, questions or other concerns.      You may also contact the following Registered Nurses for further help with nail debridement and minor hygiene concnerns.  They may come to your home or meet them at their clinic to trim your toenails and soak your feet, as well as monitor for any complications that would require evaluation by a Physician.        [facility]  Clementina Whaley RN  Office " [phone]    [facility]  Yvonne Jefferson RN  [phone]   Call or text for appointment  Some home visits and has a clinic at:  [address] [place] [id number]    [facility]  [phone]  [facility]  [place]    [facility]  [phone]  [email or web address]  [facility]    For up to date list and to find foot care nurses in other communities visit [employer]es Association website:  afcna.org.         Calluses, Corns, IPKs, Porokeratosis    When there is excessive friction or pressure on the skin, the body responds by making the skin thicker.  While this may protect the deeper structures, the thickened skin can take up more space and thus increase pressure over a bony prominence or become an open sore or skin ulcer as this skin becomes less flexible.    Flat, diffuse thickening are simple calluses and they are usually caused by friction.  Often these are the result of rubbing on a shoe or going barefoot.    Calluses with a central core between the toes are called corns.  These often result from prominent joints on adjacent toes rubbing together.  Theses are often a symptom of bone malalignment and will usually recur unless the underlying bones are addressed.    Many of these lesions can be kept comfortable with routine maintenance. This consists of filing them with a Ped Egg, callus file, or 120 grit sandpaper on a block, every day during your bath or shower.  Most people prefer battery operated bender such as an Amope', Personal Pedi and Emjoi for regular use or heavy painful callouses.  Heavy creams or ointments can be applied 1-2 times every day to keep them soft. Toe spacers can be used for corns, gel pads can be used for other lesions on the bottom of the foot. If there is a deformity noted, such as a prominent bone, often this can be addressed to minimize recurrence. However, sometimes the pressure and lesion simply migrates  to another spot after surgery, so it is not a guaranteed cure.     If you have severe callouses and cracking, you may apply heavy greasy ointments that you scoop up such as Cetaphil, Eucerin, Aquaphor or Vaseline.  For more aggressive help apply heavy creams or ointment under occlusive dressings such as Saran Wrap or Jelly Feet while sleeping.   Jelly Feet can be obtained at [email or web address].     To be successful with managing hyperkeratotic skin, you must manage hygiene daily.  At your bath or shower time is the easiest time to work on this when skin is most soft.  There is no medical or surgical treatment that will absolutely eliminate many of these symptoms.      Pedifix is a reliable source for all sorts of foot pads, cushions, or interdigital spacers and foot appliances. Go to www.cloud.IQ or request a catalog at 4-209-[employer].        Please call with any additional questions.                 Follow-ups after your visit        Additional Services     ORTHOTICS REFERRAL       [place] scheduling staff may contact patient to arrange appointments for casting of orthotics and often do not leave messages.  The patient may call this number for scheduling at their convenience. Scheduling Phone [phone].      Custom molded ankle gauntlet to stabilize and reduce motion of ankle/rearfoot. Gregg if you have other ideas please talk to me and will write order accordingly.  CC is pain with prominence about the lef navicular prominence.                  Your next 10 appointments already scheduled     Jul 23, 2018  4:15 PM CDT   [facility]    [address] [facility] [id number] [phone]           Bring a current list of meds and any records pertaining to this visit. For Physicals, please bring immunization records and any forms needing to be filled out. Please arrive 10 minutes early to complete paperwork.              Who  "to contact     If you have questions or need follow up information about today's clinic visit or your schedule please contact [facility] directly at [phone].  Normal or non-critical lab and imaging results will be communicated to you by MyChart, letter or phone within 4 business days after the clinic has received the results. If you do not hear from us within 7 days, please contact the clinic through MyChart or phone. If you have a critical or abnormal lab result, we will notify you by phone as soon as possible.  Submit refill requests through [employer] or call your pharmacy and they will forward the refill request to us. Please allow 3 business days for your refill to be completed.          Additional Information About Your Visit        [employer] Information     [employer] lets you send messages to your doctor, view your test results, renew your prescriptions, schedule appointments and more. To sign up, go to [email or web address] . Click on \"Log in\" on the left side of the screen, which will take you to the Welcome page. Then click on \"Sign up Now\" on the right side of the page.     You will be asked to enter the access code listed below, as well as some personal information. Please follow the directions to create your username and password.     Your access code is: [email or web address]  Expires: 2018  2:36 PM     Your access code will  in 90 days. If you need help or a new code, please call your [place] clinic or [phone].        Care EveryWhere ID     This is your Care EveryWhere ID. This could be used by other organizations to access your [place] medical records  [id number]        Your Vitals Were     Temperature Height BMI (Body Mass Index)             97.8  F (36.6  C) (Temporal) 1.59 m (5' 2.6\") 27.99 kg/m2          Blood Pressure from Last 3 Encounters:   18 130/70   17 126/66   17 120/60    Weight from Last 3 Encounters:   18 70.8 kg (156 lb)   18 " 68.8 kg (151 lb 12 oz)   01/04/18 68.5 kg (151 lb)              We Performed the Following     ORTHOTICS REFERRAL        Primary Care Provider Office Phone # Fax #    Ken Mar [phone]       [facility] [address] [place] [address]        Equal Access to Services     [facility] : Hadii aad ku hadasho Soomaali, waaxda luqadaha, qaybta kaalmada adeegyada, waxay idiin hayaan adeeg kharash la'aan ah. So [facility] [phone].    ATENCIÓN: Si habla español, tiene a hamm disposición servicios gratuitos de asistencia lingüística. Llame al [phone].    We comply with applicable federal civil rights laws and [place] laws. We do not discriminate on the basis of race, color, national origin, age, disability, sex, sexual orientation, or gender identity.            Thank you!     Thank you for choosing [facility]  for your care. Our goal is always to provide you with excellent care. Hearing back from our patients is one way we can continue to improve our services. Please take a few minutes to complete the written survey that you may receive in the mail after your visit with us. Thank you!             Your Updated Medication List - Protect others around you: Learn how to safely use, store and throw away your medicines at www.disposemymeds.org.          This list is accurate as of 2/7/18  1:40 PM.  Always use your most recent med list.                   Brand Name Dispense Instructions for use Diagnosis    ACETAMINOPHEN PO      Take 500-1,000 mg by mouth every 8 hours as needed for pain        amLODIPine 5 MG tablet    NORVASC    30 tablet    TAKE 1 TABLET BY MOUTH EVERY DAY    Hypertension goal BP (blood pressure) < 140/90       ASPIRIN PO      Take 81 mg by mouth daily        atorvastatin 40 MG tablet    LIPITOR    90 tablet    TAKE 1 TABLET (40 MG) BY MOUTH DAILY    ASCVD (arteriosclerotic cardiovascular disease), Hyperlipidemia LDL goal <70       clobetasol 0.05  % ointment    TEMOVATE    30 g    Apply sparingly to affected area for two weeks, then 2-3 times per week.    Lichen sclerosus       DULoxetine 20 MG EC capsule    CYMBALTA    180 capsule    TAKE 1 CAPSULE (20 MG) BY MOUTH 2 TIMES DAILY    Adjustment disorder with mixed anxiety and depressed mood       furosemide 20 MG tablet    LASIX    90 tablet    Take 1 tablet (20 mg) by mouth daily    Swelling of lower limb       lisinopril 20 MG tablet    PRINIVIL/ZESTRIL    60 tablet    TAKE 1 TABLET (20 MG) BY MOUTH 2 TIMES DAILY    Essential hypertension with goal blood pressure less than 140/90       loperamide 2 MG capsule    IMODIUM     Take 2 mg by mouth 4 times daily as needed for diarrhea        Lysine 500 MG Tabs      Take 1 tablet by mouth daily as needed        metoprolol tartrate 25 MG tablet    LOPRESSOR    180 tablet    Take 1 tablet (25 mg) by mouth 2 times daily    Essential hypertension with goal blood pressure less than 140/90       MULTIVITAMIN ADULT Chew      Take 2 chew tab by mouth daily        NITROGLYCERIN SL      Place 0.4 mg under the tongue        PROBIOTIC DAILY PO      Take 1 capsule by mouth daily

## 2018-02-14 ENCOUNTER — TELEPHONE (OUTPATIENT)
Dept: OBGYN | Facility: OTHER | Age: 78
End: 2018-02-14

## 2018-02-14 NOTE — TELEPHONE ENCOUNTER
FYI the ointment that was prescribed was for vaginal disturbances is no longer covered through insurance, do you have other recommendations for her?

## 2018-02-23 DIAGNOSIS — I10 ESSENTIAL HYPERTENSION WITH GOAL BLOOD PRESSURE LESS THAN 140/90: ICD-10-CM

## 2018-02-23 RX ORDER — METOPROLOL TARTRATE 25 MG/1
TABLET, FILM COATED ORAL
Qty: 60 TABLET | Refills: 0 | Status: SHIPPED | OUTPATIENT
Start: 2018-02-23 | End: 2018-02-23

## 2018-02-23 RX ORDER — METOPROLOL TARTRATE 25 MG/1
TABLET, FILM COATED ORAL
Qty: 180 TABLET | Refills: 2 | Status: SHIPPED | OUTPATIENT
Start: 2018-02-23 | End: 2018-11-18

## 2018-02-23 NOTE — TELEPHONE ENCOUNTER
"Requested Prescriptions   Pending Prescriptions Disp Refills     metoprolol tartrate (LOPRESSOR) 25 MG tablet [Pharmacy Med Name: METOPROLOL TARTRATE 25 MG TAB] 180 tablet 3     Sig: TAKE 1 TABLET (25 MG) BY MOUTH 2 TIMES DAILY    Beta-Blockers Protocol Passed    2/23/2018  1:24 AM       Passed - Blood pressure under 140/90 in past 12 months    BP Readings from Last 3 Encounters:   01/29/18 130/70   12/18/17 126/66   11/13/17 120/60                Passed - Patient is age 6 or older       Passed - Recent or future visit with authorizing provider's specialty    Patient had office visit in the last year or has a visit in the next 30 days with authorizing provider.  See \"Patient Info\" tab in inbasket, or \"Choose Columns\" in Meds & Orders section of the refill encounter.             Last Written Prescription Date:  2/13/17  Last Fill Quantity: 180,  # refills: 3  Last office visit: 12/18/2017 with prescribing provider:   Future Office Visit:      "

## 2018-03-21 ENCOUNTER — TELEPHONE (OUTPATIENT)
Dept: OBGYN | Facility: OTHER | Age: 78
End: 2018-03-21

## 2018-03-21 DIAGNOSIS — N90.4 LICHEN SCLEROSUS ET ATROPHICUS OF THE VULVA: Primary | ICD-10-CM

## 2018-03-22 RX ORDER — HALOBETASOL PROPIONATE 0.05 %
OINTMENT (GRAM) TOPICAL
Qty: 1 TUBE | Refills: 3 | Status: SHIPPED | OUTPATIENT
Start: 2018-03-22 | End: 2018-08-27

## 2018-04-13 DIAGNOSIS — I10 HYPERTENSION GOAL BP (BLOOD PRESSURE) < 140/90: ICD-10-CM

## 2018-04-13 RX ORDER — AMLODIPINE BESYLATE 5 MG/1
TABLET ORAL
Qty: 30 TABLET | Refills: 3 | OUTPATIENT
Start: 2018-04-13

## 2018-04-13 NOTE — TELEPHONE ENCOUNTER
"Requested Prescriptions   Pending Prescriptions Disp Refills     amLODIPine (NORVASC) 5 MG tablet [Pharmacy Med Name: AMLODIPINE BESYLATE 5 MG TAB] 30 tablet 3     Sig: TAKE 1 TABLET (5 MG) BY MOUTH DAILY    Calcium Channel Blockers Protocol  Passed    4/13/2018 12:55 AM       Passed - Blood pressure under 140/90 in past 12 months    BP Readings from Last 3 Encounters:   01/29/18 130/70   12/18/17 126/66   11/13/17 120/60                Passed - Recent (12 mo) or future (30 days) visit within the authorizing provider's specialty    Patient had office visit in the last 12 months or has a visit in the next 30 days with authorizing provider or within the authorizing provider's specialty.  See \"Patient Info\" tab in inbasket, or \"Choose Columns\" in Meds & Orders section of the refill encounter.           Passed - Patient is age 18 or older       Passed - No active pregnancy on record       Passed - Normal serum creatinine on file in past 12 months    Recent Labs   Lab Test  08/17/17   1329   CR  0.90            Passed - No positive pregnancy test in past 12 months          Last Written Prescription Date:  12/19/17  Last Fill Quantity: 30,  # refills: 9   Last Office Visit with G, P or [facility] prescribing provider:  12/18/17   Future Office Visit:       "

## 2018-04-20 DIAGNOSIS — I25.10 ASCVD (ARTERIOSCLEROTIC CARDIOVASCULAR DISEASE): ICD-10-CM

## 2018-04-20 DIAGNOSIS — E78.5 HYPERLIPIDEMIA LDL GOAL <70: ICD-10-CM

## 2018-04-20 RX ORDER — ATORVASTATIN CALCIUM 40 MG/1
40 TABLET, FILM COATED ORAL DAILY
Qty: 90 TABLET | Refills: 2 | Status: SHIPPED | OUTPATIENT
Start: 2018-04-20 | End: 2019-01-28

## 2018-04-20 NOTE — TELEPHONE ENCOUNTER
"Requested Prescriptions   Pending Prescriptions Disp Refills     atorvastatin (LIPITOR) 40 MG tablet [Pharmacy Med Name: ATORVASTATIN 40 MG TABLET] 90 tablet 0     Sig: TAKE 1 TABLET (40 MG) BY MOUTH DAILY  DUE FOR FASTING LAB     Statins Protocol Passed    4/20/2018  1:10 AM       Passed - LDL on file in past 12 months    Recent Labs   Lab Test  01/30/18   0955   LDL  29            Passed - No abnormal creatine kinase in past 12 months    Recent Labs   Lab Test  05/21/14   0605   CKT  44               Passed - Recent (12 mo) or future (30 days) visit within the authorizing provider's specialty    Patient had office visit in the last 12 months or has a visit in the next 30 days with authorizing provider or within the authorizing provider's specialty.  See \"Patient Info\" tab in inbasket, or \"Choose Columns\" in Meds & Orders section of the refill encounter.           Passed - Patient is age 18 or older       Passed - No active pregnancy on record       Passed - No positive pregnancy test in past 12 months          Last Written Prescription Date:  1/23/18  Last Fill Quantity: 90,  # refills: 0   Last Office Visit with G, P or [facility] prescribing provider:  2/7/18   Future Office Visit:       "

## 2018-05-22 DIAGNOSIS — R07.89 ATYPICAL CHEST PAIN: Primary | ICD-10-CM

## 2018-05-22 RX ORDER — NITROGLYCERIN 0.4 MG/1
TABLET SUBLINGUAL
Qty: 25 TABLET | Refills: 3 | Status: SHIPPED | OUTPATIENT
Start: 2018-05-22

## 2018-05-22 NOTE — TELEPHONE ENCOUNTER
"Script put in as historical    Last Written Prescription Date:  NA  Last Fill Quantity: NA,  # refills: NA   Last office visit: 1/29/2018 with prescribing provider:  1/29/18   Future Office Visit:      Requested Prescriptions   Pending Prescriptions Disp Refills     nitroGLYcerin (NITROSTAT) 0.4 MG sublingual tablet [Pharmacy Med Name: NITROGLYCERIN 0.4 MG TABLET SL] 25 tablet 0     Sig: TAKE EVERY 5 MINUTES BY MOUTH X 3 DOSES, IF NOT EFFECTIVE CALL MD    Nitrates Failed    5/22/2018 11:22 AM       Failed - Sublingual nitro order needs review    If refill exceeds 1 bottle per month, please forward request to provider.          Passed - Blood pressure under 140/90 in past 12 months    BP Readings from Last 3 Encounters:   01/29/18 130/70   12/18/17 126/66   11/13/17 120/60                Passed - Pt is not on erectile dysfunction medications       Passed - Recent (12 mo) or future (30 days) visit within the authorizing provider's specialty    Patient had office visit in the last 12 months or has a visit in the next 30 days with authorizing provider or within the authorizing provider's specialty.  See \"Patient Info\" tab in inbasket, or \"Choose Columns\" in Meds & Orders section of the refill encounter.           Passed - Patient is age 18 or older          "

## 2018-05-22 NOTE — TELEPHONE ENCOUNTER
Prescription approved per [facility] Refill Protocol.  Elisabet Briceño RN

## 2018-06-29 DIAGNOSIS — I10 ESSENTIAL HYPERTENSION WITH GOAL BLOOD PRESSURE LESS THAN 140/90: ICD-10-CM

## 2018-06-29 RX ORDER — LISINOPRIL 20 MG/1
TABLET ORAL
Qty: 60 TABLET | Refills: 2 | Status: SHIPPED | OUTPATIENT
Start: 2018-06-29 | End: 2018-09-14

## 2018-06-29 NOTE — TELEPHONE ENCOUNTER
"Requested Prescriptions   Pending Prescriptions Disp Refills     lisinopril (PRINIVIL/ZESTRIL) 20 MG tablet [Pharmacy Med Name: LISINOPRIL 20 MG TABLET] 60 tablet 2     Sig: TAKE 1 TABLET (20 MG) BY MOUTH 2 TIMES DAILY    ACE Inhibitors (Including Combos) Protocol Passed    6/29/2018  1:15 AM       Passed - Blood pressure under 140/90 in past 12 months    BP Readings from Last 3 Encounters:   01/29/18 130/70   12/18/17 126/66   11/13/17 120/60                Passed - Recent (12 mo) or future (30 days) visit within the authorizing provider's specialty    Patient had office visit in the last 12 months or has a visit in the next 30 days with authorizing provider or within the authorizing provider's specialty.  See \"Patient Info\" tab in inbasket, or \"Choose Columns\" in Meds & Orders section of the refill encounter.           Passed - Patient is age 18 or older       Passed - No active pregnancy on record       Passed - Normal serum creatinine on file in past 12 months    Recent Labs   Lab Test  08/17/17   1329   CR  0.90            Passed - Normal serum potassium on file in past 12 months    Recent Labs   Lab Test  08/17/17   1329   POTASSIUM  3.9            Passed - No positive pregnancy test in past 12 months          Last Written Prescription Date:  10/11/17  Last Fill Quantity: 60,  # refills: 8   Last Office Visit with [facility], [facility] or [facility] prescribing provider:  8/22/17   Future Office Visit:    Next 5 appointments (look out 90 days)     Aug 27, 2018  4:15 PM CDT   [facility]   [facility]    [address] [facility] [address]   [phone]                   "

## 2018-08-27 ENCOUNTER — OFFICE VISIT (OUTPATIENT)
Dept: OBGYN | Facility: OTHER | Age: 78
End: 2018-08-27
Payer: MEDICARE

## 2018-08-27 VITALS
DIASTOLIC BLOOD PRESSURE: 70 MMHG | HEART RATE: 66 BPM | SYSTOLIC BLOOD PRESSURE: 128 MMHG | WEIGHT: 160.25 LBS | BODY MASS INDEX: 28.75 KG/M2

## 2018-08-27 DIAGNOSIS — N95.2 ATROPHIC VAGINITIS: Primary | ICD-10-CM

## 2018-08-27 DIAGNOSIS — N90.4 LICHEN SCLEROSUS ET ATROPHICUS OF THE VULVA: ICD-10-CM

## 2018-08-27 PROCEDURE — 99213 OFFICE O/P EST LOW 20 MIN: CPT | Performed by: OBSTETRICS & GYNECOLOGY

## 2018-08-27 RX ORDER — HALOBETASOL PROPIONATE 0.05 %
OINTMENT (GRAM) TOPICAL
Qty: 1 TUBE | Refills: 3 | Status: SHIPPED | OUTPATIENT
Start: 2018-08-27

## 2018-08-27 RX ORDER — ESTRADIOL 0.1 MG/G
2 CREAM VAGINAL
Qty: 42.5 G | Refills: 3 | Status: SHIPPED | OUTPATIENT
Start: 2018-08-27

## 2018-08-27 NOTE — NURSING NOTE
"Chief Complaint   Patient presents with     Follow Up For     lichen sclerosus       Initial /70 (BP Location: Right arm, Patient Position: Chair, Cuff Size: Adult Regular)  Pulse 66  Wt 160 lb 4 oz (72.7 kg)  BMI 28.75 kg/m2 Estimated body mass index is 28.75 kg/(m^2) as calculated from the following:    Height as of 18: 5' 2.6\" (1.59 m).    Weight as of this encounter: 160 lb 4 oz (72.7 kg).  BP completed using cuff size: regular        The following HM Due: NONE      The following patient reported/Care Every where data was sent to:  P ABSTRACT QUALITY INITIATIVES [[id number]]       N/a    Sade Tariq, [facility]  2018             "

## 2018-08-27 NOTE — MR AVS SNAPSHOT
After Visit Summary   8/27/2018    Monik Santiago    MRN: [id number]           Patient Information     Date Of Birth          1940        Visit Information        Provider Department      8/27/2018 4:15 PM Elida Pruett MD [facility]        Today's Diagnoses     Atrophic vaginitis    -  1    Lichen sclerosus et atrophicus of the vulva           Follow-ups after your visit        Follow-up notes from your care team     Return in about 3 months (around 12/10/2018) for Follow up visit.      Your next 10 appointments already scheduled     Oct 09, 2018  9:30 AM CDT   Return Visit with Yung Angel MD   [facility] ([facility])    [address] [place] [id number] [phone]              Who to contact     If you have questions or need follow up information about today's clinic visit or your schedule please contact [facility] directly at [phone].  Normal or non-critical lab and imaging results will be communicated to you by MyChart, letter or phone within 4 business days after the clinic has received the results. If you do not hear from us within 7 days, please contact the clinic through MyChart or phone. If you have a critical or abnormal lab result, we will notify you by phone as soon as possible.  Submit refill requests through mSnap or call your pharmacy and they will forward the refill request to us. Please allow 3 business days for your refill to be completed.          Additional Information About Your Visit        Care EveryWhere ID     This is your Care EveryWhere ID. This could be used by other organizations to access your [place] medical records  [id number]        Your Vitals Were     Pulse BMI (Body Mass Index)                66 28.75 kg/m2           Blood Pressure from Last 3 Encounters:   08/27/18 128/70   01/29/18 130/70   12/18/17 126/66    Weight from Last 3  Encounters:   08/27/18 160 lb 4 oz (72.7 kg)   02/07/18 156 lb (70.8 kg)   01/29/18 151 lb 12 oz (68.8 kg)              Today, you had the following     No orders found for display         Today's Medication Changes          These changes are accurate as of 8/27/18  4:40 PM.  If you have any questions, ask your nurse or doctor.               Start taking these medicines.        Dose/Directions    estradiol 0.1 MG/GM cream   Commonly known as:  ESTRACE VAGINAL   Used for:  Atrophic vaginitis   Started by:  Elida Pruett MD        Dose:  2 g   Place 2 g vaginally twice a week   Quantity:  42.5 g   Refills:  3         These medicines have changed or have updated prescriptions.        Dose/Directions    halobetasol 0.05 % ointment   Commonly known as:  ULTRAVATE   This may have changed:  when to take this   Used for:  Lichen sclerosus et atrophicus of the vulva   Changed by:  Elida Pruett MD        Apply topically three times a week   Quantity:  1 Tube   Refills:  3            Where to get your medicines      These medications were sent to [place] [id number] [place] [id number]    Hours:  M-F 9-7 SAT 9-6 SUN 11-3 Phone:  [phone]     estradiol 0.1 MG/GM cream    halobetasol 0.05 % ointment                Primary Care Provider Office Phone # Fax #    Ken Mar [phone]       [address] [facility] [address]        Equal Access to Services     MALIK SMITH AH: Hadii aad ku hadasho Soomaali, waaxda luqadaha, qaybta kaalmada adeegyada, waxay kathy douglas. So [facility] [phone].    ATENCIÓN: Si miguella helder, tiene a hamm disposición servicios gratuitos de asistencia lingüística. Llame al [phone].    We comply with applicable federal civil rights laws and [place] laws. We do not discriminate on the basis of race, color, national origin, age, disability, sex, sexual orientation, or  gender identity.            Thank you!     Thank you for choosing [facility]  for your care. Our goal is always to provide you with excellent care. Hearing back from our patients is one way we can continue to improve our services. Please take a few minutes to complete the written survey that you may receive in the mail after your visit with us. Thank you!             Your Updated Medication List - Protect others around you: Learn how to safely use, store and throw away your medicines at www.disposemymeds.org.          This list is accurate as of 8/27/18  4:40 PM.  Always use your most recent med list.                   Brand Name Dispense Instructions for use Diagnosis    ACETAMINOPHEN PO      Take 500-1,000 mg by mouth every 8 hours as needed for pain        amLODIPine 5 MG tablet    NORVASC    30 tablet    TAKE 1 TABLET BY MOUTH EVERY DAY    Hypertension goal BP (blood pressure) < 140/90       ASPIRIN PO      Take 81 mg by mouth daily        atorvastatin 40 MG tablet    LIPITOR    90 tablet    Take 1 tablet (40 mg) by mouth daily    ASCVD (arteriosclerotic cardiovascular disease), Hyperlipidemia LDL goal <70       DULoxetine 20 MG EC capsule    CYMBALTA    180 capsule    TAKE 1 CAPSULE (20 MG) BY MOUTH 2 TIMES DAILY    Adjustment disorder with mixed anxiety and depressed mood       estradiol 0.1 MG/GM cream    ESTRACE VAGINAL    42.5 g    Place 2 g vaginally twice a week    Atrophic vaginitis       furosemide 20 MG tablet    LASIX    90 tablet    TAKE 1 TABLET (20 MG) BY MOUTH DAILY    Swelling of lower limb       halobetasol 0.05 % ointment    ULTRAVATE    1 Tube    Apply topically three times a week    Lichen sclerosus et atrophicus of the vulva       lisinopril 20 MG tablet    PRINIVIL/ZESTRIL    60 tablet    TAKE 1 TABLET (20 MG) BY MOUTH 2 TIMES DAILY    Essential hypertension with goal blood pressure less than 140/90       loperamide 2 MG capsule    IMODIUM     Take 2 mg by mouth 4 times  daily as needed for diarrhea        Lysine 500 MG Tabs      Take 1 tablet by mouth daily as needed        metoprolol tartrate 25 MG tablet    LOPRESSOR    180 tablet    TAKE 1 TABLET (25 MG) BY MOUTH 2 TIMES DAILY    Essential hypertension with goal blood pressure less than 140/90       MULTIVITAMIN ADULT Chew      Take 2 chew tab by mouth daily        nitroGLYcerin 0.4 MG sublingual tablet    NITROSTAT    25 tablet    TAKE EVERY 5 MINUTES BY MOUTH X 3 DOSES, IF NOT EFFECTIVE CALL MD    Atypical chest pain       PROBIOTIC DAILY PO      Take 1 capsule by mouth daily

## 2018-08-27 NOTE — PROGRESS NOTES
OB/GYN  8/27/2018       NAME:  Monik Santiago  PCP:  Zaid Ken RONAK  MRN:  [id number]    Impression / Plan     78 year old  with:      ICD-10-CM    1. Atrophic vaginitis N95.2 estradiol (ESTRACE VAGINAL) 0.1 MG/GM cream   2. Lichen sclerosus et atrophicus of the vulva N90.4 halobetasol (ULTRAVATE) 0.05 % ointment       She will use the halobetasol 3 times per week as previously instructed.  We will try estrogen cream to see if that helps her symptoms.  She will contact me if she has trouble with the cost.   We will plan to see her in December and will consider repeat biopsy of the right labia.  She will follow up sooner as needed.      Chief Complaint     Chief Complaint   Patient presents with     Follow Up For     lichen sclerosus       HPI     Monik Santiago is a 78 year old female who is seen for follow up.    Biopsy was done 10/19/17 for labial lesion and demonstrated lichen sclerosis.   FINAL DIAGNOSIS:   Labia biopsy, right majora/minora junction, 9:00:   - Lichen sclerosus.     We recently switched to halobetasol due to insurance reasons and this is working about the same.    She has been using it about twice per week.    She has continued to have dryness that is bothering her.  She has used replense and petroleum jelly, but this does not help.  We have not yet tried estrogen cream.          Problem List     Patient Active Problem List    Diagnosis Date Noted     Chest pain, atypical 11/19/2012     Priority: High     ASCVD (arteriosclerotic cardiovascular disease) 11/19/2012     Priority: High     Chest pain 08/17/2017     Priority: Medium     RUQ abdominal pain 12/03/2016     Priority: Medium     S/P laparoscopic cholecystectomy 12/03/2016     Priority: Medium     History of MRI of brain and brain stem 04/10/2015     Priority: Medium     MR BRAIN FOR STROKE COMPLETE W/O & W CONTRAST 4/9/2015 9:34 PM  MRI of the brain without and with contrast  MRA of the head without contrast  MRA of the neck without and with  contrast  History: eval for PRESS,  Comparison: 3/19/2015,   Technique:   Brain: Axial diffusion-weighted with ADC map, T2-weighted with fat  saturation, T1-weighted and turboFLAIR and coronal T1-weighted images  of the brain were obtained without intravenous contrast. Following  intravenous administration of gadolinium, axial turboFLAIR and coronal  T1-weighted images of the brain were obtained.   MRA: 3D time-of-flight MR angiography of the major arteries at the  base of the brain was performed without contrast. 2D time-of-flight  MRA without contrast with superior and inferior saturation bands and  3D T1-weighted postgadolinium MRA of the neck/cervical vessels were  also obtained. 3-dimensional reconstructions were created of the MRA  of both the head and the neck, which were reviewed by the radiologist.  Contrast: 10mL Gadavist  Findings: No areas of restricted diffusion. Marked volume loss  particularly affecting frontal, parietal and medial temporal lobe.s  Moderate confluent periventricular deep white white matter FLAIR  hyperintensities similar in appearance to previous exam. Some apparent  faint postcontrast T1 hyperintensity in the left cerebellar hemisphere  adjacent to the fourth ventricle is favored to be related to artifact.  No abnormal contrast enhancement is noted. Acute intracranial  hemorrhage or extra-axial collection. There is no hydrocephalus.  MR angiogram of the head: Posterior circulation appears patent.  Evaluation of anterior circulation is markedly limited due to motion  artifact. Bilateral intracranial internal carotid arteries are grossly  patent.  MR angiography of the neck demonstrates patent origins of the carotid  and vertebral arteries. There are codominant vertebral arteries which  are patent throughout the neck. Bilateral common carotid arteries,  carotid bifurcations and internal carotid arteries are patent.  IMPRESSION  Impression:   No acute infarction acute intracranial  hemorrhage or extra-axial  collection. No hydrocephalus.  Marked parenchymal volume loss particularly affecting the frontal  convexity, parietal lobes and bilateral medial temporal lobes.  Confluent white matter T2 hyperintensities in the periventricular deep  white matter are most compatible with moderate chronic microvascular  ischemic changes.  MR angiogram of the head is partially limited; particularly the  anterior circulation evaluation is limited.   Neck MR angiogram demonstrates no hemodynamically significant  stenosis.  I have personally reviewed the examination and initial interpretation  and I agree with the findings.  RAVINDER REYNOSO MD       Altered mental status 04/08/2015     Priority: Medium     Headache 04/08/2015     Priority: Medium     Problem list name updated by automated process. Provider to review       History of C.diff colitis 04/08/2015     Priority: Medium     Anxiety 04/08/2015     Priority: Medium     Lightheaded 03/07/2015     Priority: Medium     Lightheadedness 03/06/2015     Priority: Medium     Nausea without vomiting 02/06/2015     Priority: Medium     Problem list name updated by automated process. Provider to review       Recent repair of hiatal hernia 1/30/15 02/06/2015     Priority: Medium     Urine retention - singh placed 2/4/15 02/06/2015     Priority: Medium     Bilateral leg edema 02/06/2015     Priority: Medium     Old myocardial infarction 2012 02/06/2015     Priority: Medium     Pseudoseizure 01/30/2015     Priority: Medium     Acidosis-metabolic 01/30/2015     Priority: Medium     Syncope 06/16/2014     Priority: Medium     Health Care Home 05/27/2014     Priority: Medium     State Tier Level:  Tier 3  Status:  Closed  Care Coordinator:  Michelle Gaytan RN, BSN    See Letters for [facility] Care Plan             Abdominal pain, unspecified abdominal location 05/21/2014     Priority: Medium     Problem list name updated by automated process. Provider to review       Near syncope  05/20/2014     Priority: Medium     Coronary atherosclerosis of native coronary artery (VESSEL) 05/20/2014     Priority: Medium     Lichen sclerosus et atrophicus of the vulva 10/08/2013     Priority: Medium     CKD (chronic kidney disease) stage 3, GFR 30-59 ml/min 11/19/2012     Priority: Medium     Acute worsening of stage 3 chronic kidney disease 11/19/2012     Priority: Medium     Diagnosis updated by automated process. Provider to review and confirm.       NSTEMI (non-ST elevated myocardial infarction), history 10/06/2012     Priority: Medium     Acute myocardial infarction 10/05/2012     Priority: Medium     Hypoxia 10/05/2012     Priority: Medium     Hypertension goal BP (blood pressure) < 140/90 05/02/2011     Priority: Medium     Osteoarthritis 09/13/2010     Priority: Medium     GERD (gastroesophageal reflux disease)      Priority: Medium     Other alteration of consciousness 07/01/2007     Priority: Medium     see neuro consult 7/25/2007       Systolic CHF, chronic (H) 11/20/2012     Priority: Low     Anemia 11/20/2012     Priority: Low     DVT prophylaxis 11/20/2012     Priority: Low     Advanced directives, counseling/discussion 05/16/2012     Priority: Low     Advance Directive received and scanned. Click on Code in the patient header to view. 5/16/2012          Hyperlipidemia LDL goal <70 10/31/2010     Priority: Low     Generalized anxiety disorder      Priority: Low       Medications     Current Outpatient Prescriptions   Medication     ACETAMINOPHEN PO     amLODIPine (NORVASC) 5 MG tablet     ASPIRIN PO     atorvastatin (LIPITOR) 40 MG tablet     DULoxetine (CYMBALTA) 20 MG EC capsule     estradiol (ESTRACE VAGINAL) 0.1 MG/GM cream     furosemide (LASIX) 20 MG tablet     halobetasol (ULTRAVATE) 0.05 % ointment     lisinopril (PRINIVIL/ZESTRIL) 20 MG tablet     loperamide (IMODIUM) 2 MG capsule     Lysine 500 MG TABS     metoprolol tartrate (LOPRESSOR) 25 MG tablet     Multiple Vitamins-Minerals  (MULTIVITAMIN ADULT) CHEW     nitroGLYcerin (NITROSTAT) 0.4 MG sublingual tablet     Probiotic Product (PROBIOTIC DAILY PO)     No current facility-administered medications for this visit.         Allergies     Allergies   Allergen Reactions     Codeine Fatigue     Latex      Lidocaine Other (See Comments)     was one of 3 drugs given during GA that caused difficulty with anesthesia reversal     Sulfa Drugs Hives     Trimethoprim Hives     Valium Fatigue       ROS     A /GI organ review of systems was asked and the pertinent positives and negatives are listed in the HPI.     Physical Exam   Vitals: /70 (BP Location: Right arm, Patient Position: Chair, Cuff Size: Adult Regular)  Pulse 66  Wt 160 lb 4 oz (72.7 kg)  BMI 28.75 kg/m2    General: Comfortable, no obvious distress, normal  body habitus  Psych: Alert and orientated x 3. Appropriate affect, good insight.   : Lesion of the right labia. There is fusion of the majora and minora at the posterior aspect of the introitus bilaterally. There is loss of architecture due to lichen sclerosus and atrophy.  She also has small erosion at the posterior fourchette.  From 9:00 to the clitoris there is hypopigmentation consistent with lichen sclerosus.  2 mm purple papules noted bilaterally.   Essentially a stable exam          15 minutes was spent with patient, more than 50% counseling and coordinating care    Elida Pruett MD

## 2018-09-14 ENCOUNTER — OFFICE VISIT (OUTPATIENT)
Dept: INTERNAL MEDICINE | Facility: CLINIC | Age: 78
End: 2018-09-14
Payer: MEDICARE

## 2018-09-14 ENCOUNTER — HOSPITAL ENCOUNTER (OUTPATIENT)
Facility: CLINIC | Age: 78
Discharge: HOME | End: 2018-09-14
Attending: INTERNAL MEDICINE | Admitting: INTERNAL MEDICINE
Payer: MEDICARE

## 2018-09-14 ENCOUNTER — TELEPHONE (OUTPATIENT)
Dept: FAMILY MEDICINE | Facility: CLINIC | Age: 78
End: 2018-09-14

## 2018-09-14 VITALS
OXYGEN SATURATION: 96 % | DIASTOLIC BLOOD PRESSURE: 74 MMHG | HEART RATE: 89 BPM | WEIGHT: 160.19 LBS | RESPIRATION RATE: 16 BRPM | BODY MASS INDEX: 28.74 KG/M2 | SYSTOLIC BLOOD PRESSURE: 126 MMHG | TEMPERATURE: 97.5 F

## 2018-09-14 DIAGNOSIS — I10 HYPERTENSION GOAL BP (BLOOD PRESSURE) < 140/90: ICD-10-CM

## 2018-09-14 DIAGNOSIS — I10 ESSENTIAL HYPERTENSION WITH GOAL BLOOD PRESSURE LESS THAN 140/90: ICD-10-CM

## 2018-09-14 DIAGNOSIS — Z00.00 ENCOUNTER FOR ROUTINE ADULT HEALTH EXAMINATION WITHOUT ABNORMAL FINDINGS: Primary | ICD-10-CM

## 2018-09-14 DIAGNOSIS — Z00.00 ENCOUNTER FOR ROUTINE ADULT HEALTH EXAMINATION WITHOUT ABNORMAL FINDINGS: ICD-10-CM

## 2018-09-14 DIAGNOSIS — Z23 NEED FOR VACCINATION: ICD-10-CM

## 2018-09-14 DIAGNOSIS — H91.93 DECREASED HEARING OF BOTH EARS: ICD-10-CM

## 2018-09-14 DIAGNOSIS — Z12.39 BREAST CANCER SCREENING: ICD-10-CM

## 2018-09-14 DIAGNOSIS — Z23 NEED FOR PROPHYLACTIC VACCINATION AND INOCULATION AGAINST INFLUENZA: ICD-10-CM

## 2018-09-14 LAB
ALBUMIN SERPL-MCNC: 3.7 G/DL (ref 3.4–5)
ALP SERPL-CCNC: 95 U/L (ref 40–150)
ALT SERPL W P-5'-P-CCNC: 12 U/L (ref 0–50)
ANION GAP SERPL CALCULATED.3IONS-SCNC: 4 MMOL/L (ref 3–14)
AST SERPL W P-5'-P-CCNC: 18 U/L (ref 0–45)
BILIRUB SERPL-MCNC: 0.6 MG/DL (ref 0.2–1.3)
BUN SERPL-MCNC: 23 MG/DL (ref 7–30)
CALCIUM SERPL-MCNC: 9.3 MG/DL (ref 8.5–10.1)
CHLORIDE SERPL-SCNC: 109 MMOL/L (ref 94–109)
CHOLEST SERPL-MCNC: 127 MG/DL
CO2 SERPL-SCNC: 31 MMOL/L (ref 20–32)
CREAT SERPL-MCNC: 0.9 MG/DL (ref 0.52–1.04)
CREAT UR-MCNC: 45 MG/DL
GFR SERPL CREATININE-BSD FRML MDRD: 61 ML/MIN/1.7M2
GLUCOSE SERPL-MCNC: 86 MG/DL (ref 70–99)
HDLC SERPL-MCNC: 71 MG/DL
LDLC SERPL CALC-MCNC: 41 MG/DL
MICROALBUMIN UR-MCNC: 24 MG/L
MICROALBUMIN/CREAT UR: 53.36 MG/G CR (ref 0–25)
NONHDLC SERPL-MCNC: 56 MG/DL
POTASSIUM SERPL-SCNC: 4.2 MMOL/L (ref 3.4–5.3)
PROT SERPL-MCNC: 7.4 G/DL (ref 6.8–8.8)
SODIUM SERPL-SCNC: 144 MMOL/L (ref 133–144)
TRIGL SERPL-MCNC: 75 MG/DL

## 2018-09-14 PROCEDURE — 90670 PCV13 VACCINE IM: CPT | Performed by: INTERNAL MEDICINE

## 2018-09-14 PROCEDURE — 36415 COLL VENOUS BLD VENIPUNCTURE: CPT | Performed by: INTERNAL MEDICINE

## 2018-09-14 PROCEDURE — G0009 ADMIN PNEUMOCOCCAL VACCINE: HCPCS | Performed by: INTERNAL MEDICINE

## 2018-09-14 PROCEDURE — G0439 PPPS, SUBSEQ VISIT: HCPCS | Performed by: INTERNAL MEDICINE

## 2018-09-14 PROCEDURE — G0008 ADMIN INFLUENZA VIRUS VAC: HCPCS | Performed by: INTERNAL MEDICINE

## 2018-09-14 PROCEDURE — 80053 COMPREHEN METABOLIC PANEL: CPT | Performed by: INTERNAL MEDICINE

## 2018-09-14 PROCEDURE — 90662 IIV NO PRSV INCREASED AG IM: CPT | Performed by: INTERNAL MEDICINE

## 2018-09-14 PROCEDURE — 80061 LIPID PANEL: CPT | Performed by: INTERNAL MEDICINE

## 2018-09-14 PROCEDURE — 82043 UR ALBUMIN QUANTITATIVE: CPT | Performed by: INTERNAL MEDICINE

## 2018-09-14 RX ORDER — AMLODIPINE BESYLATE 5 MG/1
5 TABLET ORAL DAILY
Qty: 90 TABLET | Refills: 3 | Status: SHIPPED | OUTPATIENT
Start: 2018-09-14 | End: 2019-07-12

## 2018-09-14 RX ORDER — LISINOPRIL 20 MG/1
TABLET ORAL
Qty: 180 TABLET | Refills: 3 | Status: SHIPPED | OUTPATIENT
Start: 2018-09-14 | End: 2019-10-17

## 2018-09-14 ASSESSMENT — PAIN SCALES - GENERAL: PAINLEVEL: NO PAIN (0)

## 2018-09-14 NOTE — MR AVS SNAPSHOT
After Visit Summary   9/14/2018    Monik Santiago    MRN: [id number]           Patient Information     Date Of Birth          1940        Visit Information        Provider Department      9/14/2018 10:30 AM Ken Mar MD [facility]        Today's Diagnoses     Encounter for routine adult health examination without abnormal findings    -  1    Hypertension goal BP (blood pressure) < 140/90        Essential hypertension with goal blood pressure less than 140/90        Decreased hearing of both ears        Breast cancer screening          Care Instructions      Preventive Health Recommendations    Female Ages 65 +    Yearly exam:     See your health care provider every year in order to  o Review health changes.   o Discuss preventive care.    o Review your medicines if your doctor has prescribed any.      You no longer need a yearly Pap test unless you've had an abnormal Pap test in the past 10 years. If you have vaginal symptoms, such as bleeding or discharge, be sure to talk with your provider about a Pap test.      Every 1 to 2 years, have a mammogram.  If you are over 69, talk with your health care provider about whether or not you want to continue having screening mammograms.      Every 10 years, have a colonoscopy. Or, have a yearly FIT test (stool test). These exams will check for colon cancer.       Have a cholesterol test every 5 years, or more often if your doctor advises it.       Have a diabetes test (fasting glucose) every three years. If you are at risk for diabetes, you should have this test more often.       At age 65, have a bone density scan (DEXA) to check for osteoporosis (brittle bone disease).    Shots:    Get a flu shot each year.    Get a tetanus shot every 10 years.    Talk to your doctor about your pneumonia vaccines. There are now two you should receive - Pneumovax (PPSV 23) and Prevnar (PCV 13).    Talk to your pharmacist about the shingles  vaccine.    Talk to your doctor about the hepatitis B vaccine.    Nutrition:     Eat at least 5 servings of fruits and vegetables each day.      Eat whole-grain bread, whole-wheat pasta and brown rice instead of white grains and rice.      Get adequate Calcium and Vitamin D.     Lifestyle    Exercise at least 150 minutes a week (30 minutes a day, 5 days a week). This will help you control your weight and prevent disease.      Limit alcohol to one drink per day.      No smoking.       Wear sunscreen to prevent skin cancer.       See your dentist twice a year for an exam and cleaning.      See your eye doctor every 1 to 2 years to screen for conditions such as glaucoma, macular degeneration and cataracts.          Follow-ups after your visit        Additional Services     AUDIOLOGY ADULT REFERRAL       Your provider has referred you to: [facility]: [facility] [phone]   [email or web address]    Specialty Testing:  Audiogram w/Tymps and Reflexes (Comprehensive Audiology Evaluation)                  Your next 10 appointments already scheduled     Sep 25, 2018 11:30 AM CDT   CONSULT with Phuong Srivastava   [facility] ([facility])    [address] [place] [address] [id number] [phone]            Sep 25, 2018 12:30 PM CDT   MA SCREENING DIGITAL BILATERAL with PHMA1   [facility] ([facility])    [address] [place] [address] [id number] [phone]           Do not use any powder, lotion or deodorant under your arms or on your breast. If you do, we will ask you to remove it before your exam.  Wear comfortable, two-piece clothing.  If you have any allergies, tell your care team.  Bring any previous mammograms from other facilities or have them mailed to the breast center. Three-dimensional (3D) mammograms are available at [facility] locations in [facility],  "[facility], [place], [place], and [place]. [facility] locations include [place] and [facility] in [place]. Benefits of 3D mammograms include: - Improved rate of cancer detection - Decreases your chance of having to go back for more tests, which means fewer: - \"False-positive\" results (This means that there is an abnormal area but it isn't cancer.) - Invasive testing procedures, such as a biopsy or surgery - Can provide clearer images of the breast if you have dense breast tissue. 3D mammography is an optional exam that anyone can have with a 2D mammogram. It doesn't replace or take the place of a 2D mammogram. 2D mammograms remain an effective screening test for all women.  Not all insurance companies cover the cost of a 3D mammogram. Check with your insurance.            Oct 09, 2018  9:30 AM CDT   Return Visit with Yung Angel MD   [facility] ([facility])    [address] [facility] [id number] [phone]            Nov 29, 2018 10:00 AM CST   [facility] Visit with [facility]ett [facility]   [facility]    [address] [facility] [address] [phone]           Bring a current list of meds and any records pertaining to this visit. For Physicals, please bring immunization records and any forms needing to be filled out. Please arrive 10 minutes early to complete paperwork.              Future tests that were ordered for you today     Open Future Orders        Priority Expected Expires Ordered    *MA Screening Digital Bilateral Routine  9/14/2019 9/14/2018            Who to contact     If you have questions or need follow up information about today's clinic visit or your schedule please contact [facility] directly at [phone].  Normal or non-critical lab and imaging results will be communicated to you by MyChart, letter or phone within 4 business days " after the clinic has received the results. If you do not hear from us within 7 days, please contact the clinic through [employer] or phone. If you have a critical or abnormal lab result, we will notify you by phone as soon as possible.  Submit refill requests through [employer] or call your pharmacy and they will forward the refill request to us. Please allow 3 business days for your refill to be completed.          Additional Information About Your Visit        Care EveryWhere ID     This is your Care EveryWhere ID. This could be used by other organizations to access your [facility] medical records  [id number]        Your Vitals Were     Pulse Temperature Respirations Pulse Oximetry Breastfeeding? BMI (Body Mass Index)    89 97.5  F (36.4  C) (Temporal) 16 96% No 28.74 kg/m2       Blood Pressure from Last 3 Encounters:   09/14/18 126/74   08/27/18 128/70   01/29/18 130/70    Weight from Last 3 Encounters:   09/14/18 160 lb 3 oz (72.7 kg)   08/27/18 160 lb 4 oz (72.7 kg)   02/07/18 156 lb (70.8 kg)              We Performed the Following     Albumin Random Urine Quantitative with Creat Ratio     AUDIOLOGY ADULT REFERRAL     Comprehensive metabolic panel     Lipid Profile          Today's Medication Changes          These changes are accurate as of 9/14/18 11:35 AM.  If you have any questions, ask your nurse or doctor.               These medicines have changed or have updated prescriptions.        Dose/Directions    amLODIPine 5 MG tablet   Commonly known as:  NORVASC   This may have changed:  See the new instructions.   Used for:  Hypertension goal BP (blood pressure) < 140/90   Changed by:  Ken Mar MD        Dose:  5 mg   Take 1 tablet (5 mg) by mouth daily   Quantity:  90 tablet   Refills:  3            Where to get your medicines      These medications were sent to [place] [address] [place] [address] [address]    Hours:  M-F 9-7 SAT 9-6 SUN 11-3  Phone:  [phone]     amLODIPine 5 MG tablet    lisinopril 20 MG tablet                Primary Care Provider Office Phone # Fax #    Ken Mar [phone]       [address] [facility] [address]        Equal Access to Services     PHYLLISMALIK SARAH : Hadii aad ku hadasho Soomaali, waaxda luqadaha, qaybta kaalmada adeegyada, waxay idiin haydestinyn adekieran martinez lasoniajoycelyn douglas. So [facility] [phone].    ATENCIÓN: Si habla español, tiene a hamm disposición servicios gratuitos de asistencia lingüística. Llame al [phone].    We comply with applicable federal civil rights laws and [place] laws. We do not discriminate on the basis of race, color, national origin, age, disability, sex, sexual orientation, or gender identity.            Thank you!     Thank you for choosing [facility]  for your care. Our goal is always to provide you with excellent care. Hearing back from our patients is one way we can continue to improve our services. Please take a few minutes to complete the written survey that you may receive in the mail after your visit with us. Thank you!             Your Updated Medication List - Protect others around you: Learn how to safely use, store and throw away your medicines at www.disposemymeds.org.          This list is accurate as of 9/14/18 11:35 AM.  Always use your most recent med list.                   Brand Name Dispense Instructions for use Diagnosis    ACETAMINOPHEN PO      Take 500-1,000 mg by mouth every 8 hours as needed for pain        amLODIPine 5 MG tablet    NORVASC    90 tablet    Take 1 tablet (5 mg) by mouth daily    Hypertension goal BP (blood pressure) < 140/90       ASPIRIN PO      Take 81 mg by mouth daily        atorvastatin 40 MG tablet    LIPITOR    90 tablet    Take 1 tablet (40 mg) by mouth daily    ASCVD (arteriosclerotic cardiovascular disease), Hyperlipidemia LDL goal <70       DULoxetine 20 MG EC capsule    CYMBALTA    180 capsule    TAKE 1  CAPSULE (20 MG) BY MOUTH 2 TIMES DAILY    Adjustment disorder with mixed anxiety and depressed mood       estradiol 0.1 MG/GM cream    ESTRACE VAGINAL    42.5 g    Place 2 g vaginally twice a week    Atrophic vaginitis       furosemide 20 MG tablet    LASIX    90 tablet    TAKE 1 TABLET (20 MG) BY MOUTH DAILY    Swelling of lower limb       halobetasol 0.05 % ointment    ULTRAVATE    1 Tube    Apply topically three times a week    Lichen sclerosus et atrophicus of the vulva       lisinopril 20 MG tablet    PRINIVIL/ZESTRIL    180 tablet    TAKE 1 TABLET (20 MG) BY MOUTH 2 TIMES DAILY    Essential hypertension with goal blood pressure less than 140/90       loperamide 2 MG capsule    IMODIUM     Take 2 mg by mouth 4 times daily as needed for diarrhea        Lysine 500 MG Tabs      Take 1 tablet by mouth daily as needed        metoprolol tartrate 25 MG tablet    LOPRESSOR    180 tablet    TAKE 1 TABLET (25 MG) BY MOUTH 2 TIMES DAILY    Essential hypertension with goal blood pressure less than 140/90       MULTIVITAMIN ADULT Chew      Take 2 chew tab by mouth daily        nitroGLYcerin 0.4 MG sublingual tablet    NITROSTAT    25 tablet    TAKE EVERY 5 MINUTES BY MOUTH X 3 DOSES, IF NOT EFFECTIVE CALL MD    Atypical chest pain       PROBIOTIC DAILY PO      Take 1 capsule by mouth daily

## 2018-09-14 NOTE — PROGRESS NOTES
"SUBJECTIVE:       Error NOTE      Patient is here for her annual physical, would also like to talk about some hearing loss and having sweats during the day and night.   Are you in the first 12 months of your Medicare coverage?  No    HPI        Reviewed and updated as needed this visit by Provider            No flowsheet data found.        y's PHQ-2 Score:   PHQ-2 ( 1999 Pfizer) 4/8/2015   Q1: Little interest or pleasure in doing things 0   Q2: Feeling down, depressed or hopeless 0   PHQ-2 Score 0     Current providers sharing in care for this patient include:   Patient Care Team:  Ken Mar MD as PCP - General    The following health maintenance items are reviewed in Epic and correct as of today:  Health Maintenance   Topic Date Due     HF ACTION PLAN Q3 YR  1940     MEDICARE ANNUAL WELLNESS VISIT  07/03/1958     DEXA SCAN SCREENING (SYSTEM ASSIGNED)  07/03/2005     PNEUMOCOCCAL (2 of 2 - PCV13) 10/21/2010     ADVANCE DIRECTIVE PLANNING Q5 YRS  05/16/2017     FALL RISK ASSESSMENT  02/13/2018     PHQ-2 Q1 YR  02/13/2018     BMP Q6 MOS  02/17/2018     ALT Q1 YR  08/17/2018     CBC Q1 YR  08/17/2018     INFLUENZA VACCINE (1) 09/01/2018     LIPID MONITORING Q1 YEAR  01/30/2019     TETANUS IMMUNIZATION (SYSTEM ASSIGNED)  05/16/2022       Review of Systems      OBJECTIVE:   /74 (BP Location: Right arm, Patient Position: Sitting, Cuff Size: Adult Regular)  Pulse 89  Temp 97.5  F (36.4  C) (Temporal)  Resp 16  Wt 160 lb 3 oz (72.7 kg)  SpO2 96%  Breastfeeding? No  BMI 28.74 kg/m2 Estimated body mass index is 28.74 kg/(m^2) as calculated from the following:    Height as of 2/7/18: 5' 2.6\" (1.59 m).    Weight as of this encounter: 160 lb 3 oz (72.7 kg).  Physical Exam    ASSESSMENT / PLAN:   {    BP Readings from Last 1 Encounters:   09/14/18 126/74     Estimated body mass index is 28.74 kg/(m^2) as calculated from the following:    Height as of 2/7/18: 5' 2.6\" (1.59 m).    Weight as of this " encounter: 160 lb 3 oz (72.7 kg).           reports that she has never smoked. She has never used smokeless tobacco.      Appropriate preventive services were discussed with this patient, including applicable screening as appropriate for cardiovascular disease, diabetes, osteopenia/osteoporosis, and glaucoma.  As appropriate for age/gender, discussed screening for colorectal cancer, prostate cancer, breast cancer, and cervical cancer. Checklist reviewing preventive services available has been given to the patient.    Reviewed patients plan of care and provided an AVS. The  for Monik meets the Care Plan requirement. This Care Plan has been established and reviewed with the .    Counseling Resources:  ATP IV Guidelines  Pooled Cohorts Equation Calculator  Breast Cancer Risk Calculator  FRAX Risk Assessment  ICSI Preventive Guidelines  Dietary Guidelines for Americans, 2010  [employer] MyPlate  ASA Prophylaxis  Lung CA Screening    Ken Mar MD  [facility]    Injectable Influenza Immunization Documentation    1.  Is the person to be vaccinated sick today?   No    2. Does the person to be vaccinated have an allergy to a component   of the vaccine?   No  Egg Allergy Algorithm Link    3. Has the person to be vaccinated ever had a serious reaction   to influenza vaccine in the past?   No    4. Has the person to be vaccinated ever had Guillain-Barré syndrome?   No    Form completed by Bia Marinelli MA  Prior to injection verified patient identity using patient's name and date of birth.  Due to injection administration, patient instructed to remain in clinic for 15 minutes  afterwards, and to report any adverse reaction to me immediately.

## 2018-09-14 NOTE — TELEPHONE ENCOUNTER
----- Message from Ken Mar MD sent at 9/14/2018  1:11 PM CDT -----  Her labs are good, no changes needed.

## 2018-09-14 NOTE — LETTER
September 14, 2018      Monik Padronyer  [address] [facility] [address]        Dear ,    We are writing to inform you of your test results.    Your test results fall within the expected range(s) or remain unchanged from previous results.  Please continue with current treatment plan.    Resulted Orders   Lipid Profile   Result Value Ref Range    Cholesterol 127 <200 mg/dL    Triglycerides 75 <150 mg/dL      Comment:      Non Fasting    HDL Cholesterol 71 >49 mg/dL    LDL Cholesterol Calculated 41 <100 mg/dL      Comment:      Desirable:       <100 mg/dl    Non HDL Cholesterol 56 <130 mg/dL   Comprehensive metabolic panel   Result Value Ref Range    Sodium 144 133 - 144 mmol/L    Potassium 4.2 3.4 - 5.3 mmol/L    Chloride 109 94 - 109 mmol/L    Carbon Dioxide 31 20 - 32 mmol/L    Anion Gap 4 3 - 14 mmol/L    Glucose 86 70 - 99 mg/dL      Comment:      Non Fasting    Urea Nitrogen 23 7 - 30 mg/dL    Creatinine 0.90 0.52 - 1.04 mg/dL    GFR Estimate 61 >60 mL/min/1.7m2      Comment:      Non  GFR Calc    GFR Estimate If Black 74 >60 mL/min/1.7m2      Comment:       GFR Calc    Calcium 9.3 8.5 - 10.1 mg/dL    Bilirubin Total 0.6 0.2 - 1.3 mg/dL    Albumin 3.7 3.4 - 5.0 g/dL    Protein Total 7.4 6.8 - 8.8 g/dL    Alkaline Phosphatase 95 40 - 150 U/L    ALT 12 0 - 50 U/L    AST 18 0 - 45 U/L       If you have any questions or concerns, please call the clinic at the number listed above.       Sincerely,        Ken Mar MD

## 2018-09-14 NOTE — PROGRESS NOTES
SUBJECTIVE:   Monik Santiago is a 78 year old female who presents for Preventive Visit    Are you in the first 12 months of your Medicare Part B coverage?  No    Doing ok,     Hearing is getting worse, can't hear people.  Wonders about an obstruction.     Sweats a lot from cymbalta but mood is good.       Healthy Habits:    Do you get at least three servings of calcium containing foods daily (dairy, green leafy vegetables, etc.)? yes    Amount of exercise or daily activities, outside of work: none    Problems taking medications regularly No    Medication side effects: No    Have you had an eye exam in the past two years?     Do you see a dentist twice per year?     Do you have sleep apnea, excessive snoring or daytime drowsiness?no      Ability to successfully perform activities of daily living: needs some assistance    Home safety:       Hearing impairment: Yes, Difficulty following a conversation in a noisy restaurant or crowded room.    Feel that people are mumbling or not speaking clearly.    Fall risk:    Cognitive screen, limited    Reviewed and updated as needed this visit by clinical staff  Tobacco  Allergies  Meds  Med Hx  Surg Hx  Fam Hx  Soc Hx        Reviewed and updated as needed this visit by Provider        Social History   Substance Use Topics     Smoking status: Never Smoker     Smokeless tobacco: Never Used     Alcohol use No       If you drink alcohol do you typically have >3 drinks per day or >7 drinks per week? none                        Today's PHQ-2 Score:   PHQ-2 ( 1999 Pfizer) 4/8/2015 2/23/2015   Q1: Little interest or pleasure in doing things 0 0   Q2: Feeling down, depressed or hopeless 0 0   PHQ-2 Score 0 0       Do you feel safe in your environment - Yes    Do you have a Health Care Directive?:     Current providers sharing in care for this patient include:   Patient Care Team:  Ken Mar MD as PCP - General    The following health maintenance items are reviewed in Epic  "and correct as of today:  Health Maintenance   Topic Date Due     HF ACTION PLAN Q3 YR  1940     MEDICARE ANNUAL WELLNESS VISIT  07/03/1958     DEXA SCAN SCREENING (SYSTEM ASSIGNED)  07/03/2005     PNEUMOCOCCAL (2 of 2 - PCV13) 10/21/2010     ADVANCE DIRECTIVE PLANNING Q5 YRS  05/16/2017     FALL RISK ASSESSMENT  02/13/2018     PHQ-2 Q1 YR  02/13/2018     BMP Q6 MOS  02/17/2018     ALT Q1 YR  08/17/2018     CBC Q1 YR  08/17/2018     INFLUENZA VACCINE (1) 09/01/2018     LIPID MONITORING Q1 YEAR  01/30/2019     TETANUS IMMUNIZATION (SYSTEM ASSIGNED)  05/16/2022         Pneumonia Vaccine:will do prevnar 13 shot today.     ROS:  CONSTITUTIONAL: NEGATIVE for fever, chills, change in weight  INTEGUMENTARY/SKIN: NEGATIVE for worrisome rashes, moles or lesions  EYES: NEGATIVE for vision changes or irritation  ENT/MOUTH: NEGATIVE for ear, mouth and throat problems  RESP: NEGATIVE for significant cough or SOB  BREAST: NEGATIVE for masses, tenderness or discharge  CV: NEGATIVE for chest pain, palpitations or peripheral edema  GI: NEGATIVE for nausea, abdominal pain, heartburn, or change in bowel habits  : NEGATIVE for frequency, dysuria, or hematuria  MUSCULOSKELETAL:arthritis pains  NEURO: NEGATIVE for weakness, dizziness or paresthesias  ENDOCRINE: NEGATIVE for temperature intolerance, skin/hair changes  HEME: NEGATIVE for bleeding problems  PSYCHIATRIC: NEGATIVE for changes in mood or affect    OBJECTIVE:   /74 (BP Location: Right arm, Patient Position: Sitting, Cuff Size: Adult Regular)  Pulse 89  Temp 97.5  F (36.4  C) (Temporal)  Resp 16  Wt 160 lb 3 oz (72.7 kg)  SpO2 96%  Breastfeeding? No  BMI 28.74 kg/m2 Estimated body mass index is 28.74 kg/(m^2) as calculated from the following:    Height as of 2/7/18: 5' 2.6\" (1.59 m).    Weight as of this encounter: 160 lb 3 oz (72.7 kg).  EXAM:   GENERAL: healthy, alert and no distress  HENT: ear canals and TM's normal, nose and mouth without ulcers or " "lesions  NECK: no adenopathy, no asymmetry, masses, or scars and thyroid normal to palpation  RESP: lungs clear to auscultation - no rales, rhonchi or wheezes  CV: regular rate and rhythm, normal S1 S2, no S3 or S4, no murmur, click or rub, no peripheral edema and peripheral pulses strong  ABDOMEN: soft, nontender, no hepatosplenomegaly, no masses and bowel sounds normal  MS: no gross musculoskeletal defects noted, no edema  SKIN: no suspicious lesions or rashes  NEURO: Normal strength and tone, mentation intact and speech normal  PSYCH: mentation appears normal, affect normal/bright        ASSESSMENT / PLAN:       ICD-10-CM    1. Encounter for routine adult health examination without abnormal findings Z00.00 Lipid Profile     Comprehensive metabolic panel     Albumin Random Urine Quantitative with Creat Ratio   2. Hypertension goal BP (blood pressure) < 140/90 I10 Lipid Profile     Comprehensive metabolic panel     Albumin Random Urine Quantitative with Creat Ratio     amLODIPine (NORVASC) 5 MG tablet   3. Essential hypertension with goal blood pressure less than 140/90 I10 lisinopril (PRINIVIL/ZESTRIL) 20 MG tablet   4. Decreased hearing of both ears H91.93 AUDIOLOGY ADULT REFERRAL   5. Breast cancer screening Z12.31 *MA Screening Digital Bilateral       End of Life Planning:  Patient currently has an advanced directive: No.  I have verified the patient's ablity to prepare an advanced directive/make health care decisions.  Literature was provided to assist patient in preparing an advanced directive.    COUNSELING:  Reviewed preventive health counseling, as reflected in patient instructions       Regular exercise       Healthy diet/nutrition       Immunizations    Vaccinated for: Influenza and Pneumococcal          BP Readings from Last 1 Encounters:   09/14/18 126/74     Estimated body mass index is 28.74 kg/(m^2) as calculated from the following:    Height as of 2/7/18: 5' 2.6\" (1.59 m).    Weight as of this " encounter: 160 lb 3 oz (72.7 kg).       reports that she has never smoked. She has never used smokeless tobacco.      Appropriate preventive services were discussed with this patient, including applicable screening as appropriate for cardiovascular disease, diabetes, osteopenia/osteoporosis, and glaucoma.  As appropriate for age/gender, discussed screening for colorectal cancer, prostate cancer, breast cancer, and cervical cancer. Checklist reviewing preventive services available has been given to the patient.    Reviewed patients plan of care and provided an AVS. The Basic Care Plan (routine screening as documented in Health Maintenance) for Monik meets the Care Plan requirement. This Care Plan has been established and reviewed with the Patient.    Counseling Resources:  ATP IV Guidelines  Pooled Cohorts Equation Calculator  Breast Cancer Risk Calculator  FRAX Risk Assessment  ICSI Preventive Guidelines  Dietary Guidelines for Americans, 2010  USDA's MyPlate  ASA Prophylaxis  Lung CA Screening    Ken Mar MD  [facility]

## 2018-09-14 NOTE — NURSING NOTE
Screening Questionnaire for Adult Immunization    Are you sick today?   No   Do you have allergies to medications, food, a vaccine component or latex?   Yes   Have you ever had a serious reaction after receiving a vaccination?   No   Do you have a long-term health problem with heart disease, lung disease, asthma, kidney disease, metabolic disease (e.g. diabetes), anemia, or other blood disorder?   Yes   Do you have cancer, leukemia, HIV/AIDS, or any other immune system problem?   No   In the past 3 months, have you taken medications that affect  your immune system, such as prednisone, other steroids, or anticancer drugs; drugs for the treatment of rheumatoid arthritis, Crohn s disease, or psoriasis; or have you had radiation treatments?   Yes   Have you had a seizure, or a brain or other nervous system problem?   Yes   During the past year, have you received a transfusion of blood or blood     products, or been given immune (gamma) globulin or antiviral drug?   No   For women: Are you pregnant or is there a chance you could become        pregnant during the next month?   No   Have you received any vaccinations in the past 4 weeks?   No     Immunization questionnaire was positive for at least one answer.  Notified Dr. Mar and he okayed to give vaccines.        Per orders of Dr. Mar, injection of PCV13 and Flu Shot given by Bia Marinelli. Patient instructed to remain in clinic for 15 minutes afterwards, and to report any adverse reaction to me immediately.       Screening performed by Bia Marinelli on 9/14/2018 at 11:29 AM.

## 2018-09-16 ENCOUNTER — NURSE TRIAGE (OUTPATIENT)
Dept: NURSING | Facility: CLINIC | Age: 78
End: 2018-09-16

## 2018-09-17 ENCOUNTER — OFFICE VISIT (OUTPATIENT)
Dept: INTERNAL MEDICINE | Facility: CLINIC | Age: 78
End: 2018-09-17
Payer: MEDICARE

## 2018-09-17 VITALS
BODY MASS INDEX: 28.71 KG/M2 | SYSTOLIC BLOOD PRESSURE: 136 MMHG | OXYGEN SATURATION: 95 % | WEIGHT: 160 LBS | DIASTOLIC BLOOD PRESSURE: 68 MMHG | HEART RATE: 86 BPM | TEMPERATURE: 98 F | RESPIRATION RATE: 16 BRPM

## 2018-09-17 DIAGNOSIS — T50.Z95A ADVERSE EFFECT OF VACCINE, INITIAL ENCOUNTER: Primary | ICD-10-CM

## 2018-09-17 PROCEDURE — 99213 OFFICE O/P EST LOW 20 MIN: CPT | Performed by: INTERNAL MEDICINE

## 2018-09-17 ASSESSMENT — PAIN SCALES - GENERAL: PAINLEVEL: NO PAIN (0)

## 2018-09-17 NOTE — TELEPHONE ENCOUNTER
Reason for Disposition    [1] Redness or red streak around the injection site AND [2] begins > 48 hours after shot AND [3] no fever  (Exception: red area < 1 inch or 2.5 cm wide)    Additional Information    Negative: [1] Difficulty with breathing or swallowing AND [2] starts within 2 hours after injection    Negative: Difficult to awaken or acting confused (disoriented, slurred speech)    Negative: Unresponsive, passed out, or very weak    Negative: Sounds like a life-threatening emergency to the triager    Negative: Fever > 104 F (40 C)    Negative: [1] Fever > 101 F (38.3 C) AND [2] age > 60    Negative: [1] Fever > 101 F (38.3 C) AND [2] bedridden (e.g., nursing home patient, CVA, chronic illness, recovering from surgery)    Negative: [1] Fever > 100.5 F (38.1 C) AND [2] diabetes mellitus or weak immune system (e.g., HIV positive, cancer chemo, splenectomy, organ transplant, chronic steroids)    Negative: [1] Measles vaccine rash (onset day 6-12) AND [2] purple or blood-colored    Negative: Sounds like a severe, unusual reaction to the triager    Negative: [1] Redness or red streak around the injection site AND [2] begins > 48 hours after shot AND [3] fever    Protocols used: IMMUNIZATION REACTIONS-ADULT-EMERSON Novak (daughter) calls and says that her mother got a flu shot and a pneumococcal shot on Friday-am, in her right arm. Right arm shot spot is now red, raised, warm, and painful. Afebrile. Scarlet will take her mother to the [facility] tomorrow, to see verito Pascual

## 2018-09-17 NOTE — MR AVS SNAPSHOT
"              After Visit Summary   9/17/2018    Monik Santiago    MRN: [id number]           Patient Information     Date Of Birth          1940        Visit Information        Provider Department      9/17/2018 10:30 AM Ken Mar MD [facility]         Follow-ups after your visit        Your next 10 appointments already scheduled     Sep 25, 2018 11:30 AM CDT   CONSULT with Phuong Srivastava   [facility] ([facility]    [address] [place] [id number] [phone]            Sep 25, 2018 12:30 PM CDT   MA SCREENING DIGITAL BILATERAL with PHMA1   [facility] ([facility])    [address] [facility] [facility] [id number] [phone]           Do not use any powder, lotion or deodorant under your arms or on your breast. If you do, we will ask you to remove it before your exam.  Wear comfortable, two-piece clothing.  If you have any allergies, tell your care team.  Bring any previous mammograms from other facilities or have them mailed to the breast center. Three-dimensional (3D) mammograms are available at [facility] locations in [facility], [facility], [facility], [place], [place], and [place]. [facility] locations include [place] and [facility] in [place]. Benefits of 3D mammograms include: - Improved rate of cancer detection - Decreases your chance of having to go back for more tests, which means fewer: - \"False-positive\" results (This means that there is an abnormal area but it isn't cancer.) - Invasive testing procedures, such as a biopsy or surgery - Can provide clearer images of the breast if you have dense breast tissue. 3D mammography is an optional exam that anyone can have with a 2D mammogram. It doesn't replace or take the place of a 2D mammogram. 2D mammograms remain an effective screening test for all women.  Not all insurance companies cover the cost of " a 3D mammogram. Check with your insurance.            Oct 09, 2018  9:30 AM CDT   Return Visit with Yung Angel MD   [facility] ([facility])    [address] [facility] [id number] [phone]            Nov 29, 2018 10:00 AM CST   [facility]    [address] [place] [facility] [address] [phone]           Bring a current list of meds and any records pertaining to this visit. For Physicals, please bring immunization records and any forms needing to be filled out. Please arrive 10 minutes early to complete paperwork.              Who to contact     If you have questions or need follow up information about today's clinic visit or your schedule please contact [facility] directly at [phone].  Normal or non-critical lab and imaging results will be communicated to you by MyChart, letter or phone within 4 business days after the clinic has received the results. If you do not hear from us within 7 days, please contact the clinic through MyChart or phone. If you have a critical or abnormal lab result, we will notify you by phone as soon as possible.  Submit refill requests through [employer] or call your pharmacy and they will forward the refill request to us. Please allow 3 business days for your refill to be completed.          Additional Information About Your Visit        Care EveryWhere ID     This is your Care EveryWhere ID. This could be used by other organizations to access your [facility] medical records  [id number]        Your Vitals Were     Pulse Temperature Respirations Pulse Oximetry BMI (Body Mass Index)       86 98  F (36.7  C) (Temporal) 16 95% 28.71 kg/m2        Blood Pressure from Last 3 Encounters:   09/17/18 136/68   09/14/18 126/74   08/27/18 128/70    Weight from Last 3 Encounters:   09/17/18 160 lb (72.6 kg)   09/14/18 160  lb 3 oz (72.7 kg)   08/27/18 160 lb 4 oz (72.7 kg)              Today, you had the following     No orders found for display       Primary Care Provider Office Phone # Fax #    Ken Mar [phone] [address] [facility] [address]        Equal Access to Services     DANIEL SMITH : Hadii aad ku hadasho Soomaali, waaxda luqadaha, qaybta kaalmada adeegyada, waxay idiin hayaan adeeg kharash la'aan . So [facility] [phone].    ATENCIÓN: Si habla español, tiene a hamm disposición servicios gratuitos de asistencia lingüística. Llame al [phone].    We comply with applicable federal civil rights laws and [place] laws. We do not discriminate on the basis of race, color, national origin, age, disability, sex, sexual orientation, or gender identity.            Thank you!     Thank you for choosing [facility]  for your care. Our goal is always to provide you with excellent care. Hearing back from our patients is one way we can continue to improve our services. Please take a few minutes to complete the written survey that you may receive in the mail after your visit with us. Thank you!             Your Updated Medication List - Protect others around you: Learn how to safely use, store and throw away your medicines at www.disposemymeds.org.          This list is accurate as of 9/17/18 10:37 AM.  Always use your most recent med list.                   Brand Name Dispense Instructions for use Diagnosis    ACETAMINOPHEN PO      Take 500-1,000 mg by mouth every 8 hours as needed for pain        amLODIPine 5 MG tablet    NORVASC    90 tablet    Take 1 tablet (5 mg) by mouth daily    Hypertension goal BP (blood pressure) < 140/90       ASPIRIN PO      Take 81 mg by mouth daily        atorvastatin 40 MG tablet    LIPITOR    90 tablet    Take 1 tablet (40 mg) by mouth daily    ASCVD (arteriosclerotic cardiovascular disease), Hyperlipidemia LDL goal <70       DULoxetine 20 MG EC  capsule    CYMBALTA    180 capsule    TAKE 1 CAPSULE (20 MG) BY MOUTH 2 TIMES DAILY    Adjustment disorder with mixed anxiety and depressed mood       estradiol 0.1 MG/GM cream    ESTRACE VAGINAL    42.5 g    Place 2 g vaginally twice a week    Atrophic vaginitis       furosemide 20 MG tablet    LASIX    90 tablet    TAKE 1 TABLET (20 MG) BY MOUTH DAILY    Swelling of lower limb       halobetasol 0.05 % ointment    ULTRAVATE    1 Tube    Apply topically three times a week    Lichen sclerosus et atrophicus of the vulva       lisinopril 20 MG tablet    PRINIVIL/ZESTRIL    180 tablet    TAKE 1 TABLET (20 MG) BY MOUTH 2 TIMES DAILY    Essential hypertension with goal blood pressure less than 140/90       loperamide 2 MG capsule    IMODIUM     Take 2 mg by mouth 4 times daily as needed for diarrhea        Lysine 500 MG Tabs      Take 1 tablet by mouth daily as needed        metoprolol tartrate 25 MG tablet    LOPRESSOR    180 tablet    TAKE 1 TABLET (25 MG) BY MOUTH 2 TIMES DAILY    Essential hypertension with goal blood pressure less than 140/90       MULTIVITAMIN ADULT Chew      Take 2 chew tab by mouth daily        nitroGLYcerin 0.4 MG sublingual tablet    NITROSTAT    25 tablet    TAKE EVERY 5 MINUTES BY MOUTH X 3 DOSES, IF NOT EFFECTIVE CALL MD    Atypical chest pain       PROBIOTIC DAILY PO      Take 1 capsule by mouth daily

## 2018-09-17 NOTE — PROGRESS NOTES
SUBJECTIVE:   Monik Santiago is a 78 year old female who presents to clinic today for the following health issues:      Right upper arm redness and irritation since Saturday, she received her flu shot on that side on Friday.  She says initially there was a ridge its maybe decreased in swelling but is still warm and irritating to her.    Past Medical History:   Diagnosis Date     Clostridium difficile diarrhea      Coronary artery disease      Generalized anxiety disorder      GERD (gastroesophageal reflux disease)      History of MRI of brain and brain stem 4/10/2015    MR BRAIN FOR STROKE COMPLETE W/O & W CONTRAST 4/9/2015 9:34 PM MRI of the brain without and with contrast MRA of the head without contrast MRA of the neck without and with contrast History: eval for PRESS, Comparison: 3/19/2015,  Technique:  Brain: Axial diffusion-weighted with ADC map, T2-weighted with fat saturation, T1-weighted and turboFLAIR and coronal T1-weighted images of the brain were obt     Myocardial infarction     NSTEMI     Other alteration of consciousness 7/2007    see neuro consult 7/25/2007. MinCep THOMSON was neg. Does not have seizures.      Other and unspecified hyperlipidemia      Syncope 10/19/2009    related to BP medications     Unspecified essential hypertension      Current Outpatient Prescriptions   Medication     amLODIPine (NORVASC) 5 MG tablet     ASPIRIN PO     atorvastatin (LIPITOR) 40 MG tablet     DULoxetine (CYMBALTA) 20 MG EC capsule     estradiol (ESTRACE VAGINAL) 0.1 MG/GM cream     furosemide (LASIX) 20 MG tablet     halobetasol (ULTRAVATE) 0.05 % ointment     lisinopril (PRINIVIL/ZESTRIL) 20 MG tablet     loperamide (IMODIUM) 2 MG capsule     Lysine 500 MG TABS     metoprolol tartrate (LOPRESSOR) 25 MG tablet     Multiple Vitamins-Minerals (MULTIVITAMIN ADULT) CHEW     nitroGLYcerin (NITROSTAT) 0.4 MG sublingual tablet     Probiotic Product (PROBIOTIC DAILY PO)     ACETAMINOPHEN PO     No current  "facility-administered medications for this visit.        Physical Exam  /68  Pulse 86  Temp 98  F (36.7  C) (Temporal)  Resp 16  Wt 160 lb (72.6 kg)  SpO2 95%  BMI 28.71 kg/m2  General Appearance-healthy, alert, no distress  Right upper arm has an area of redness 2\" x 4\" in size, it is mild warmth, no fluctuance or swelling.  She does have a couple bug bites behind it.    ASSESSMENT:  Reaction in the right arm with redness appears to be from her immunization not a true cellulitis, will follow this at this time.  I gave him the size parameters and they will watch this and and if it worsens will then treat with an antibiotic but for now just monitor.    Electronically signed by Ken Mar MD        "

## 2018-09-21 NOTE — TELEPHONE ENCOUNTER
Called and spoke to Daughter Scarlet (CSC on file) and informed her of message below.    Sade Tariq, CMA  March 22, 2018    
Please let the patient know that I sent in a prescription for the halobetasol to [facility] in [place].  She can use up what she has of the clobetasol.  Instructions and precautions are the same.  She should continue to use the steroid ointment 2-3 times per week as previously instructed.  
Provider to review and advise if would like to consider alternative medication or try starting a PA. Rody Salamanca RN, BSN     
Pt daughter Scarlet calling back states insurance will cover Haloebtasol and Sluocinonide  
Reason for call:  Daughter is calling to let us know that the ins is not covering clobetasol anymore so she would like to know what else she could use.  She uses [facility] in [place]. Ok to leave a detailed message .    
Spoke with Scarlet patients daughter and relayed Dr Pruett's message below.  She will contact the insurance company and see what medications are covered and give us a call back.  
Typically insurance companies will give a list of medications they will cover.  Will they cover betamethasone diproprionate?  They can try the prior auth process and then go from there if she has some ointment left  
maximum assist (25% patients effort)

## 2018-09-25 ENCOUNTER — HOSPITAL ENCOUNTER (OUTPATIENT)
Dept: MAMMOGRAPHY | Facility: CLINIC | Age: 78
Discharge: HOME | End: 2018-09-25
Attending: INTERNAL MEDICINE | Admitting: INTERNAL MEDICINE
Payer: MEDICARE

## 2018-09-25 ENCOUNTER — OFFICE VISIT (OUTPATIENT)
Dept: AUDIOLOGY | Facility: CLINIC | Age: 78
End: 2018-09-25
Attending: INTERNAL MEDICINE
Payer: MEDICARE

## 2018-09-25 DIAGNOSIS — H90.3 SENSORINEURAL HEARING LOSS, BILATERAL: Primary | ICD-10-CM

## 2018-09-25 DIAGNOSIS — Z12.31 VISIT FOR SCREENING MAMMOGRAM: ICD-10-CM

## 2018-09-25 PROCEDURE — 92557 COMPREHENSIVE HEARING TEST: CPT | Performed by: AUDIOLOGIST

## 2018-09-25 PROCEDURE — 92550 TYMPANOMETRY & REFLEX THRESH: CPT | Performed by: AUDIOLOGIST

## 2018-09-25 PROCEDURE — 99207 ZZC NO CHARGE LOS: CPT | Performed by: AUDIOLOGIST

## 2018-09-25 PROCEDURE — 77063 BREAST TOMOSYNTHESIS BI: CPT

## 2018-09-27 NOTE — PROGRESS NOTES
AUDIOLOGY REPORT    SUBJECTIVE:  Monik Santiago is a 78 year old female who was seen in the Audiology Clinic at the [facility] for audiologic evaluation, referred by Ken Mar. The patient reports a decrease in hearing in the last 12 months.  She observes she relies on visual cues to understand what is said.  She struggles in groups and to understand at [employer] and wonders if the left ear is worse than the right. She reported brief infrequent tinnitus and uses a walker or cane for balance.  She has had vertigo attacks last episodes were 2-3 years ago. She reported occasional pressure in her ear/jaw more in the left ear. The patient notes difficulty with communication in a variety of listening situations.  They were accompanied today by their daughter.    OBJECTIVE:  Otoscopic exam indicates ears are clear of cerumen bilaterally     Pure Tone Thresholds assessed using conventional audiometry with good  reliability from 250-8000 Hz bilaterally using insert earphones     RIGHT:  mild and moderate sensorineural hearing loss    LEFT:    mild and moderate sensorineural hearing loss worse than the right ear from 1000 Hz to 3000 Hz    Tympanogram:    RIGHT: normal eardrum mobility    LEFT:   normal eardrum mobility    Reflexes (reported by stimulus ear):  RIGHT: Ipsilateral is present at normal levels  RIGHT: Contralateral is present at normal levels  LEFT:   Ipsilateral is present at normal levels  LEFT:   Contralateral is present at normal levels      Speech Reception Threshold:    RIGHT: 25 dB HL    LEFT:   35 dB HL  Word Recognition Score:     RIGHT: 92% at 65 dB HL using NU-6 recorded word list.    LEFT:   56% at 75 dB HL using NU-6 recorded word list.      ASSESSMENT:   Asymmetric sensorineural hearing loss, left ear worse than right for tones and speech.    Today s results were discussed with the patient in detail.     PLAN:  Patient was counseled regarding  hearing loss and impact on communication. It is recommended that the patient schedule an ENT consult given asymmetric hearing loss for medical clearance before discussing amplification options..  Please call this clinic with questions regarding these results or recommendations.        Abdirahman Katz.  [place] Licensed Audiologist #[id number]'

## 2018-10-09 ENCOUNTER — OFFICE VISIT (OUTPATIENT)
Dept: CARDIOLOGY | Facility: CLINIC | Age: 78
End: 2018-10-09
Payer: MEDICARE

## 2018-10-09 VITALS
DIASTOLIC BLOOD PRESSURE: 70 MMHG | BODY MASS INDEX: 28.53 KG/M2 | SYSTOLIC BLOOD PRESSURE: 130 MMHG | OXYGEN SATURATION: 99 % | HEIGHT: 63 IN | HEART RATE: 68 BPM | WEIGHT: 161 LBS

## 2018-10-09 DIAGNOSIS — I25.10 CORONARY ARTERY DISEASE INVOLVING NATIVE CORONARY ARTERY OF NATIVE HEART WITHOUT ANGINA PECTORIS: Primary | ICD-10-CM

## 2018-10-09 PROCEDURE — 99213 OFFICE O/P EST LOW 20 MIN: CPT | Performed by: INTERNAL MEDICINE

## 2018-10-09 NOTE — PROGRESS NOTES
HPI and Plan:   See dictation(#[id number])    Orders Placed This Encounter   Procedures     Follow-Up with Cardiologist       No orders of the defined types were placed in this encounter.      There are no discontinued medications.      Encounter Diagnosis   Name Primary?     Coronary artery disease involving native coronary artery of native heart without angina pectoris Yes       CURRENT MEDICATIONS:  Current Outpatient Prescriptions   Medication Sig Dispense Refill     ACETAMINOPHEN PO Take 500-1,000 mg by mouth every 8 hours as needed for pain       amLODIPine (NORVASC) 5 MG tablet Take 1 tablet (5 mg) by mouth daily 90 tablet 3     ASPIRIN PO Take 81 mg by mouth daily       atorvastatin (LIPITOR) 40 MG tablet Take 1 tablet (40 mg) by mouth daily 90 tablet 2     DULoxetine (CYMBALTA) 20 MG EC capsule TAKE 1 CAPSULE (20 MG) BY MOUTH 2 TIMES DAILY 180 capsule 3     estradiol (ESTRACE VAGINAL) 0.1 MG/GM cream Place 2 g vaginally twice a week 42.5 g 3     furosemide (LASIX) 20 MG tablet TAKE 1 TABLET (20 MG) BY MOUTH DAILY 90 tablet 2     halobetasol (ULTRAVATE) 0.05 % ointment Apply topically three times a week 1 Tube 3     lisinopril (PRINIVIL/ZESTRIL) 20 MG tablet TAKE 1 TABLET (20 MG) BY MOUTH 2 TIMES DAILY 180 tablet 3     loperamide (IMODIUM) 2 MG capsule Take 2 mg by mouth 4 times daily as needed for diarrhea       Lysine 500 MG TABS Take 1 tablet by mouth daily as needed        metoprolol tartrate (LOPRESSOR) 25 MG tablet TAKE 1 TABLET (25 MG) BY MOUTH 2 TIMES DAILY 180 tablet 2     Multiple Vitamins-Minerals (MULTIVITAMIN ADULT) CHEW Take 2 chew tab by mouth daily       Probiotic Product (PROBIOTIC DAILY PO) Take 1 capsule by mouth daily       nitroGLYcerin (NITROSTAT) 0.4 MG sublingual tablet TAKE EVERY 5 MINUTES BY MOUTH X 3 DOSES, IF NOT EFFECTIVE CALL MD (Patient not taking: Reported on 10/9/2018) 25 tablet 3       ALLERGIES     Allergies   Allergen Reactions     Codeine Fatigue     Latex      Lidocaine  Other (See Comments)     was one of 3 drugs given during GA that caused difficulty with anesthesia reversal     Sulfa Drugs Hives     Trimethoprim Hives     Valium Fatigue       PAST MEDICAL HISTORY:  Past Medical History:   Diagnosis Date     Clostridium difficile diarrhea      Coronary artery disease      Generalized anxiety disorder      GERD (gastroesophageal reflux disease)      History of MRI of brain and brain stem 4/10/2015    MR BRAIN FOR STROKE COMPLETE W/O & W CONTRAST 4/9/2015 9:34 PM MRI of the brain without and with contrast MRA of the head without contrast MRA of the neck without and with contrast History: eval for PRESS, Comparison: 3/19/2015,  Technique:  Brain: Axial diffusion-weighted with ADC map, T2-weighted with fat saturation, T1-weighted and turboFLAIR and coronal T1-weighted images of the brain were obt     Myocardial infarction     NSTEMI     Other alteration of consciousness 7/2007    see neuro consult 7/25/2007. MinCep THOMSON was neg. Does not have seizures.      Other and unspecified hyperlipidemia      Syncope 10/19/2009    related to BP medications     Unspecified essential hypertension        PAST SURGICAL HISTORY:  Past Surgical History:   Procedure Laterality Date     C NONSPECIFIC PROCEDURE      abd hernia repair     C TOTAL KNEE ARTHROPLASTY  08/23/10    Right knee     Cardiac stents       COLONOSCOPY N/A 2/17/2016    Procedure: COMBINED COLONOSCOPY, SINGLE OR MULTIPLE BIOPSY/POLYPECTOMY BY BIOPSY;  Surgeon: Jose Gan MD;  Location: [facility]     ESOPHAGOSCOPY, GASTROSCOPY, DUODENOSCOPY (EGD), COMBINED N/A 12/17/2014    Procedure: COMBINED ESOPHAGOSCOPY, GASTROSCOPY, DUODENOSCOPY (EGD);  Surgeon: Kaz Mccoy MD;  Location: [facility]     HC REMV CATARACT EXTRACAP,INSERT LENS,COMP      darrian     HC UGI ENDOSCOPY DIAG W BIOPSY  12/16/09     HC YAG LASER CAPSULOTOMY  9/17/2009    Right eye     HEART CATH, ANGIOPLASTY  10/03/2012    Stent of LAD     HEART CATH, ANGIOPLASTY  05/17/2014      CAD, patent stent of LAD     LAPAROSCOPIC CHOLECYSTECTOMY N/A 12/3/2016    Procedure: LAPAROSCOPIC CHOLECYSTECTOMY;  Surgeon: Viral Meyers MD;  Location: [facility]     LAPAROSCOPIC HERNIORRHAPHY HIATAL N/A 1/30/2015    Procedure: LAPAROSCOPIC HERNIORRHAPHY HIATAL;  Surgeon: Chase Camara MD;  Location: [facility]     LAPAROSCOPIC NISSEN FUNDOPLICATION N/A 1/30/2015    Procedure: LAPAROSCOPIC NISSEN FUNDOPLICATION;  Surgeon: Chase Camara MD;  Location: [facility]     MOHS MICROGRAPHIC PROCEDURE  09/14/11    left upper forehead-09/14/11       FAMILY HISTORY:  Family History   Problem Relation Age of Onset     Cardiovascular Father      Cardiovascular Brother      Cardiovascular Mother      Hypertension Mother      Lipids Mother      Cardiovascular Sister      stents x 2-3     Hypertension Sister      Cardiovascular Sister      MI 64     Diabetes No family hx of        SOCIAL HISTORY:  Social History     Social History     Marital status:      Spouse name: N/A     Number of children: N/A     Years of education: N/A     Occupational History     retired      Social History Main Topics     Smoking status: Never Smoker     Smokeless tobacco: Never Used     Alcohol use No     Drug use: No     Sexual activity: No     Other Topics Concern     Not on file     Social History Narrative    Lives with daughters family since June 2007. Moved from [place].       Review of Systems:  Skin:  Negative       Eyes:  Positive for glasses    ENT:  Negative      Respiratory:  Positive for dyspnea on exertion little winded up and down stairs   Cardiovascular:  Negative for;palpitations;chest pain;lightheadedness;dizziness edema;Positive for    Gastroenterology: Negative      Genitourinary:  Negative      Musculoskeletal:  Positive for arthritis;back pain;neck pain;joint pain    Neurologic:  Positive for   looses balance real easy, uses a cane  Psychiatric:  Positive for anxiety on meds   Heme/Lymph/Imm:  Positive  "for allergies    Endocrine:  Negative        Physical Exam:  Vitals: /70 (BP Location: Right arm, Patient Position: Fowlers, Cuff Size: Adult Regular)  Pulse 68  Ht 1.59 m (5' 2.6\")  Wt 73 kg (161 lb)  SpO2 99%  BMI 28.89 kg/m2    Constitutional:           Skin:             Head:           Eyes:           Lymph:      ENT:           Neck:           Respiratory:            Cardiac:                                                           GI:           Extremities and Muscular Skeletal:                 Neurological:           Psych:             CC  No referring provider defined for this encounter.                  "

## 2018-10-09 NOTE — PROGRESS NOTES
Service Date: 10/09/2018      OFFICE PROGRESS NOTE      REASON FOR CLINIC VISIT:  Followup CAD.       HISTORY OF PRESENT ILLNESS:  Ms. Santiago is a very pleasant 78-year-old female with history of CAD with history of LAD PCI in 2012 and repeat coronary angiogram in 2014 that showed patent stent and only mild non-flow-limiting coronary artery disease elsewhere.  It was felt at that time that her symptoms were likely from hiatal hernia for which she had surgery and symptoms resolved.  She had a stress test done last year that was negative for any ischemia or infarct.  LV function was normal.  She also had an echocardiogram done last year that showed normal LV function, normal RV systolic function.  She had some issues with hypertension last year but after increasing antihypertensive medication, it has been well controlled.  Today, she is coming for routine followup.  She has no specific cardiac complaints.  No chest discomfort or shortness of breath with rest or with physical activity.  No dizziness, presyncope or syncope.  LDL is well controlled at 41.  Blood pressure is also well controlled at 130/70 and she is on amlodipine, lisinopril, metoprolol.  Additionally, she is on baby aspirin and Lipitor 40 mg daily.      PHYSICAL EXAMINATION:   VITAL SIGNS:  Blood pressure 130/70, heart rate 68 and regular, weight 161 pounds, BMI 28.95.   GENERAL:  The patient appears pleasant, comfortable.   NECK:  Normal JVP, no bruit.   CARDIOVASCULAR SYSTEM:  S1, S2 normal, no murmur, rub or gallop.   RESPIRATORY SYSTEM:  Clear to auscultation bilaterally.   GASTROINTESTINAL SYSTEM:  Abdomen soft, nontender.   EXTREMITIES:  No pitting pedal edema.   NEUROLOGICAL:  Alert, oriented x3.   PSYCHIATRIC:  Normal affect.   SKIN:  No obvious rash.   HEENT:  No pallor or icterus.      IMPRESSION AND PLAN:  A very pleasant 78-year-old female with history of CAD.  Overall, cardiac status-wise, she is doing quite well without any symptoms of  angina or congestive heart failure.  She has other comorbidities of hypertension, dyslipidemia.  Blood pressure and LDL are both well controlled.  She is on appropriate CAD medical regimen therapy of aspirin, high-intensity statin, beta blocker, ACE inhibitor.  If she continues to feel stable cardiac status-wise, we can see her back in 1 year, sooner if she notes any change in clinical status, especially any exertional-related symptoms.         CHET LEMON MD             D: 10/09/2018   T: 10/09/2018   MT: JAIME      Name:     SHARI FARLEY   MRN:      [id number]        Account:      [id number]   :      1940           Service Date: 10/09/2018      Document: [id number]

## 2018-10-09 NOTE — LETTER
10/9/2018      Ken Mar MD  [address] [place] [id number]      RE: Monik Santiago       Dear Colleague,    I had the pleasure of seeing Monik Santiago in the [facility].    Service Date: 10/09/2018      OFFICE PROGRESS NOTE      REASON FOR CLINIC VISIT:  Followup CAD.       HISTORY OF PRESENT ILLNESS:  Ms. Santiago is a very pleasant 78-year-old female with history of CAD with history of LAD PCI in 2012 and repeat coronary angiogram in 2014 that showed patent stent and only mild non-flow-limiting coronary artery disease elsewhere.  It was felt at that time that her symptoms were likely from hiatal hernia for which she had surgery and symptoms resolved.  She had a stress test done last year that was negative for any ischemia or infarct.  LV function was normal.  She also had an echocardiogram done last year that showed normal LV function, normal RV systolic function.  She had some issues with hypertension last year but after increasing antihypertensive medication, it has been well controlled.  Today, she is coming for routine followup.  She has no specific cardiac complaints.  No chest discomfort or shortness of breath with rest or with physical activity.  No dizziness, presyncope or syncope.  LDL is well controlled at 41.  Blood pressure is also well controlled at 130/70 and she is on amlodipine, lisinopril, metoprolol.  Additionally, she is on baby aspirin and Lipitor 40 mg daily.      PHYSICAL EXAMINATION:   VITAL SIGNS:  Blood pressure 130/70, heart rate 68 and regular, weight 161 pounds, BMI 28.95.   GENERAL:  The patient appears pleasant, comfortable.   NECK:  Normal JVP, no bruit.   CARDIOVASCULAR SYSTEM:  S1, S2 normal, no murmur, rub or gallop.   RESPIRATORY SYSTEM:  Clear to auscultation bilaterally.   GASTROINTESTINAL SYSTEM:  Abdomen soft, nontender.   EXTREMITIES:  No pitting pedal edema.   NEUROLOGICAL:  Alert, oriented x3.   PSYCHIATRIC:  Normal affect.    SKIN:  No obvious rash.   HEENT:  No pallor or icterus.      IMPRESSION AND PLAN:  A very pleasant 78-year-old female with history of CAD.  Overall, cardiac status-wise, she is doing quite well without any symptoms of angina or congestive heart failure.  She has other comorbidities of hypertension, dyslipidemia.  Blood pressure and LDL are both well controlled.  She is on appropriate CAD medical regimen therapy of aspirin, high-intensity statin, beta blocker, ACE inhibitor.  If she continues to feel stable cardiac status-wise, we can see her back in 1 year, sooner if she notes any change in clinical status, especially any exertional-related symptoms.         CHET LEMON MD             D: 10/09/2018   T: 10/09/2018   MT: JAIME      Name:     SHARI FARLEY   MRN:      [id number]        Account:      [id number]   :      1940           Service Date: 10/09/2018      Document: [id number]         Outpatient Encounter Prescriptions as of 10/9/2018   Medication Sig Dispense Refill     ACETAMINOPHEN PO Take 500-1,000 mg by mouth every 8 hours as needed for pain       amLODIPine (NORVASC) 5 MG tablet Take 1 tablet (5 mg) by mouth daily 90 tablet 3     ASPIRIN PO Take 81 mg by mouth daily       atorvastatin (LIPITOR) 40 MG tablet Take 1 tablet (40 mg) by mouth daily 90 tablet 2     DULoxetine (CYMBALTA) 20 MG EC capsule TAKE 1 CAPSULE (20 MG) BY MOUTH 2 TIMES DAILY 180 capsule 3     estradiol (ESTRACE VAGINAL) 0.1 MG/GM cream Place 2 g vaginally twice a week 42.5 g 3     furosemide (LASIX) 20 MG tablet TAKE 1 TABLET (20 MG) BY MOUTH DAILY 90 tablet 2     halobetasol (ULTRAVATE) 0.05 % ointment Apply topically three times a week 1 Tube 3     lisinopril (PRINIVIL/ZESTRIL) 20 MG tablet TAKE 1 TABLET (20 MG) BY MOUTH 2 TIMES DAILY 180 tablet 3     loperamide (IMODIUM) 2 MG capsule Take 2 mg by mouth 4 times daily as needed for diarrhea       Lysine 500 MG TABS Take 1 tablet by mouth daily as needed        metoprolol  tartrate (LOPRESSOR) 25 MG tablet TAKE 1 TABLET (25 MG) BY MOUTH 2 TIMES DAILY 180 tablet 2     Multiple Vitamins-Minerals (MULTIVITAMIN ADULT) CHEW Take 2 chew tab by mouth daily       Probiotic Product (PROBIOTIC DAILY PO) Take 1 capsule by mouth daily       nitroGLYcerin (NITROSTAT) 0.4 MG sublingual tablet TAKE EVERY 5 MINUTES BY MOUTH X 3 DOSES, IF NOT EFFECTIVE CALL MD (Patient not taking: Reported on 10/9/2018) 25 tablet 3     No facility-administered encounter medications on file as of 10/9/2018.        Again, thank you for allowing me to participate in the care of your patient.      Sincerely,    Yung Angel MD     [facility]

## 2018-10-09 NOTE — MR AVS SNAPSHOT
After Visit Summary   10/9/2018    Monik Santiago    MRN: [id number]           Patient Information     Date Of Birth          1940        Visit Information        Provider Department      10/9/2018 9:30 AM Yung Angel MD [facility]        Today's Diagnoses     Coronary artery disease involving native coronary artery of native heart without angina pectoris    -  1       Follow-ups after your visit        Additional Services     Follow-Up with Cardiologist                 Your next 10 appointments already scheduled     Oct 22, 2018  8:45 AM CDT   [facility]    [address] [facility] [address]   [phone]            Nov 29, 2018 10:00 AM CST   [facility]    [address] [facility] [id number] [phone]           Bring a current list of meds and any records pertaining to this visit. For Physicals, please bring immunization records and any forms needing to be filled out. Please arrive 10 minutes early to complete paperwork.              Future tests that were ordered for you today     Open Future Orders        Priority Expected Expires Ordered    Follow-Up with Cardiologist Routine 10/9/2019 10/10/2019 10/9/2018            Who to contact     If you have questions or need follow up information about today's clinic visit or your schedule please contact [facility] directly at [phone].  Normal or non-critical lab and imaging results will be communicated to you by MyChart, letter or phone within 4 business days after the clinic has received the results. If you do not hear from us within 7 days, please contact the clinic through MyChart or phone. If you have a critical or abnormal lab result, we will notify you by phone as soon  "as possible.  Submit refill requests through [employer] or call your pharmacy and they will forward the refill request to us. Please allow 3 business days for your refill to be completed.          Additional Information About Your Visit        Care EveryWhere ID     This is your Care EveryWhere ID. This could be used by other organizations to access your [facility] medical records  [id number]        Your Vitals Were     Pulse Height Pulse Oximetry BMI (Body Mass Index)          68 1.59 m (5' 2.6\") 99% 28.89 kg/m2         Blood Pressure from Last 3 Encounters:   10/09/18 130/70   09/17/18 136/68   09/14/18 126/74    Weight from Last 3 Encounters:   10/09/18 73 kg (161 lb)   09/17/18 72.6 kg (160 lb)   09/14/18 72.7 kg (160 lb 3 oz)               Primary Care Provider Office Phone # Fax #    Ken Mar [phone]       [address] [facility] [address]        Equal Access to Services     [facility] : Hadii aad ku hadasho Soomaali, waaxda luqadaha, qaybta kaalmada adeegyada, waxay idiin haydestinyn lala emmanuelararamon pickett . So [facility] [phone].    ATENCIÓN: Si habla español, tiene a hamm disposición servicios gratuitos de asistencia lingüística. [facility] [phone].    We comply with applicable federal civil rights laws and [place] laws. We do not discriminate on the basis of race, color, national origin, age, disability, sex, sexual orientation, or gender identity.            Thank you!     Thank you for choosing [facility]  for your care. Our goal is always to provide you with excellent care. Hearing back from our patients is one way we can continue to improve our services. Please take a few minutes to complete the written survey that you may receive in the mail after your visit with us. Thank you!             Your Updated Medication List - Protect others around you: Learn how to safely use, store and throw away your medicines at www.disposemymeds.org. "          This list is accurate as of 10/9/18  9:56 AM.  Always use your most recent med list.                   Brand Name Dispense Instructions for use Diagnosis    ACETAMINOPHEN PO      Take 500-1,000 mg by mouth every 8 hours as needed for pain        amLODIPine 5 MG tablet    NORVASC    90 tablet    Take 1 tablet (5 mg) by mouth daily    Hypertension goal BP (blood pressure) < 140/90       ASPIRIN PO      Take 81 mg by mouth daily        atorvastatin 40 MG tablet    LIPITOR    90 tablet    Take 1 tablet (40 mg) by mouth daily    ASCVD (arteriosclerotic cardiovascular disease), Hyperlipidemia LDL goal <70       DULoxetine 20 MG EC capsule    CYMBALTA    180 capsule    TAKE 1 CAPSULE (20 MG) BY MOUTH 2 TIMES DAILY    Adjustment disorder with mixed anxiety and depressed mood       estradiol 0.1 MG/GM cream    ESTRACE VAGINAL    42.5 g    Place 2 g vaginally twice a week    Atrophic vaginitis       furosemide 20 MG tablet    LASIX    90 tablet    TAKE 1 TABLET (20 MG) BY MOUTH DAILY    Swelling of lower limb       halobetasol 0.05 % ointment    ULTRAVATE    1 Tube    Apply topically three times a week    Lichen sclerosus et atrophicus of the vulva       lisinopril 20 MG tablet    PRINIVIL/ZESTRIL    180 tablet    TAKE 1 TABLET (20 MG) BY MOUTH 2 TIMES DAILY    Essential hypertension with goal blood pressure less than 140/90       loperamide 2 MG capsule    IMODIUM     Take 2 mg by mouth 4 times daily as needed for diarrhea        Lysine 500 MG Tabs      Take 1 tablet by mouth daily as needed        metoprolol tartrate 25 MG tablet    LOPRESSOR    180 tablet    TAKE 1 TABLET (25 MG) BY MOUTH 2 TIMES DAILY    Essential hypertension with goal blood pressure less than 140/90       MULTIVITAMIN ADULT Chew      Take 2 chew tab by mouth daily        nitroGLYcerin 0.4 MG sublingual tablet    NITROSTAT    25 tablet    TAKE EVERY 5 MINUTES BY MOUTH X 3 DOSES, IF NOT EFFECTIVE CALL MD    Atypical chest pain       PROBIOTIC  DAILY PO      Take 1 capsule by mouth daily

## 2018-10-09 NOTE — LETTER
10/9/2018    Ken Mar MD  [address] [facility] [address]    RE: Monik Santiago       Dear Colleague,    I had the pleasure of seeing Monik Santiago in the [facility].    HPI and Plan:   See dictation(#[id number])    Orders Placed This Encounter   Procedures     Follow-Up with Cardiologist       No orders of the defined types were placed in this encounter.      There are no discontinued medications.      Encounter Diagnosis   Name Primary?     Coronary artery disease involving native coronary artery of native heart without angina pectoris Yes       CURRENT MEDICATIONS:  Current Outpatient Prescriptions   Medication Sig Dispense Refill     ACETAMINOPHEN PO Take 500-1,000 mg by mouth every 8 hours as needed for pain       amLODIPine (NORVASC) 5 MG tablet Take 1 tablet (5 mg) by mouth daily 90 tablet 3     ASPIRIN PO Take 81 mg by mouth daily       atorvastatin (LIPITOR) 40 MG tablet Take 1 tablet (40 mg) by mouth daily 90 tablet 2     DULoxetine (CYMBALTA) 20 MG EC capsule TAKE 1 CAPSULE (20 MG) BY MOUTH 2 TIMES DAILY 180 capsule 3     estradiol (ESTRACE VAGINAL) 0.1 MG/GM cream Place 2 g vaginally twice a week 42.5 g 3     furosemide (LASIX) 20 MG tablet TAKE 1 TABLET (20 MG) BY MOUTH DAILY 90 tablet 2     halobetasol (ULTRAVATE) 0.05 % ointment Apply topically three times a week 1 Tube 3     lisinopril (PRINIVIL/ZESTRIL) 20 MG tablet TAKE 1 TABLET (20 MG) BY MOUTH 2 TIMES DAILY 180 tablet 3     loperamide (IMODIUM) 2 MG capsule Take 2 mg by mouth 4 times daily as needed for diarrhea       Lysine 500 MG TABS Take 1 tablet by mouth daily as needed        metoprolol tartrate (LOPRESSOR) 25 MG tablet TAKE 1 TABLET (25 MG) BY MOUTH 2 TIMES DAILY 180 tablet 2     Multiple Vitamins-Minerals (MULTIVITAMIN ADULT) CHEW Take 2 chew tab by mouth daily       Probiotic Product (PROBIOTIC DAILY PO) Take 1 capsule by mouth daily       nitroGLYcerin (NITROSTAT) 0.4 MG sublingual tablet TAKE  EVERY 5 MINUTES BY MOUTH X 3 DOSES, IF NOT EFFECTIVE CALL MD (Patient not taking: Reported on 10/9/2018) 25 tablet 3       ALLERGIES     Allergies   Allergen Reactions     Codeine Fatigue     Latex      Lidocaine Other (See Comments)     was one of 3 drugs given during GA that caused difficulty with anesthesia reversal     Sulfa Drugs Hives     Trimethoprim Hives     Valium Fatigue       PAST MEDICAL HISTORY:  Past Medical History:   Diagnosis Date     Clostridium difficile diarrhea      Coronary artery disease      Generalized anxiety disorder      GERD (gastroesophageal reflux disease)      History of MRI of brain and brain stem 4/10/2015    MR BRAIN FOR STROKE COMPLETE W/O & W CONTRAST 4/9/2015 9:34 PM MRI of the brain without and with contrast MRA of the head without contrast MRA of the neck without and with contrast History: eval for PRESS, Comparison: 3/19/2015,  Technique:  Brain: Axial diffusion-weighted with ADC map, T2-weighted with fat saturation, T1-weighted and turboFLAIR and coronal T1-weighted images of the brain were obt     Myocardial infarction     NSTEMI     Other alteration of consciousness 7/2007    see neuro consult 7/25/2007. MinCep THOMSON was neg. Does not have seizures.      Other and unspecified hyperlipidemia      Syncope 10/19/2009    related to BP medications     Unspecified essential hypertension        PAST SURGICAL HISTORY:  Past Surgical History:   Procedure Laterality Date     C NONSPECIFIC PROCEDURE      abd hernia repair     C TOTAL KNEE ARTHROPLASTY  08/23/10    Right knee     Cardiac stents       COLONOSCOPY N/A 2/17/2016    Procedure: COMBINED COLONOSCOPY, SINGLE OR MULTIPLE BIOPSY/POLYPECTOMY BY BIOPSY;  Surgeon: oJse Gan MD;  Location: [facility]     ESOPHAGOSCOPY, GASTROSCOPY, DUODENOSCOPY (EGD), COMBINED N/A 12/17/2014    Procedure: COMBINED ESOPHAGOSCOPY, GASTROSCOPY, DUODENOSCOPY (EGD);  Surgeon: Kaz Mccoy MD;  Location:  GI     HC REMV CATARACT EXTRACAP,INSERT  LENS,COMP      darrian     HC UGI ENDOSCOPY DIAG W BIOPSY  12/16/09     HC YAG LASER CAPSULOTOMY  9/17/2009    Right eye     HEART CATH, ANGIOPLASTY  10/03/2012    Stent of LAD     HEART CATH, ANGIOPLASTY  05/17/2014     CAD, patent stent of LAD     LAPAROSCOPIC CHOLECYSTECTOMY N/A 12/3/2016    Procedure: LAPAROSCOPIC CHOLECYSTECTOMY;  Surgeon: Viral Meyers MD;  Location: [facility]     LAPAROSCOPIC HERNIORRHAPHY HIATAL N/A 1/30/2015    Procedure: LAPAROSCOPIC HERNIORRHAPHY HIATAL;  Surgeon: Chase Camara MD;  Location: [facility]     LAPAROSCOPIC NISSEN FUNDOPLICATION N/A 1/30/2015    Procedure: LAPAROSCOPIC NISSEN FUNDOPLICATION;  Surgeon: Chase Camara MD;  Location: [facility]     MOHS MICROGRAPHIC PROCEDURE  09/14/11    left upper forehead-09/14/11       FAMILY HISTORY:  Family History   Problem Relation Age of Onset     Cardiovascular Father      Cardiovascular Brother      Cardiovascular Mother      Hypertension Mother      Lipids Mother      Cardiovascular Sister      stents x 2-3     Hypertension Sister      Cardiovascular Sister      MI 64     Diabetes No family hx of        SOCIAL HISTORY:  Social History     Social History     Marital status:      Spouse name: N/A     Number of children: N/A     Years of education: N/A     Occupational History     retired      Social History Main Topics     Smoking status: Never Smoker     Smokeless tobacco: Never Used     Alcohol use No     Drug use: No     Sexual activity: No     Other Topics Concern     Not on file     Social History Narrative    Lives with daughters family since June 2007. Moved from [place].       Review of Systems:  Skin:  Negative       Eyes:  Positive for glasses    ENT:  Negative      Respiratory:  Positive for dyspnea on exertion little winded up and down stairs   Cardiovascular:  Negative for;palpitations;chest pain;lightheadedness;dizziness edema;Positive for    Gastroenterology: Negative      Genitourinary:  Negative     "  Musculoskeletal:  Positive for arthritis;back pain;neck pain;joint pain    Neurologic:  Positive for   looses balance real easy, uses a cane  Psychiatric:  Positive for anxiety on meds   Heme/Lymph/Imm:  Positive for allergies    Endocrine:  Negative        Physical Exam:  Vitals: /70 (BP Location: Right arm, Patient Position: Fowlers, Cuff Size: Adult Regular)  Pulse 68  Ht 1.59 m (5' 2.6\")  Wt 73 kg (161 lb)  SpO2 99%  BMI 28.89 kg/m2    Constitutional:           Skin:             Head:           Eyes:           Lymph:      ENT:           Neck:           Respiratory:            Cardiac:                                                           GI:           Extremities and Muscular Skeletal:                 Neurological:           Psych:             CC  No referring provider defined for this encounter.                    Thank you for allowing me to participate in the care of your patient.      Sincerely,     Yung Angel MD     [facility]    cc:   No referring provider defined for this encounter.        "

## 2018-10-19 NOTE — PROGRESS NOTES
ENT Consultation    Monik Santiago who is a 78 year old female seen in consultation at the request of Cheryl Baca.      History of Present Illness - Monik Santiago is a 78 year old female who had an audiogram completed on 09/25/2018. Her audiogram showed that her left ear has more hearing loss than the right. She has assymetry in word recognition, right is at 92% and left is at 56%. Patient reports that she has a noticeable amount of hearing loss in the last 12 months. Patient has had problem with balance and vertigo for many years. She had 1 episode of vertigo lasting days 5+ years ago, now she has vertigo with movement at times. She has bilateral tinnitus that is intermittent. She does have a lump occasionally over the mastoid tip. No significant history of ear infections.      Body mass index is 29.06 kg/(m^2).    BP Readings from Last 1 Encounters:   10/22/18 138/76     BP noted to be well controlled today in office.     Monik IS NOT a smoker/uses chewing tobacco.       Past Medical History -   Past Medical History:   Diagnosis Date     Clostridium difficile diarrhea      Coronary artery disease      Generalized anxiety disorder      GERD (gastroesophageal reflux disease)      History of MRI of brain and brain stem 4/10/2015    MR BRAIN FOR STROKE COMPLETE W/O & W CONTRAST 4/9/2015 9:34 PM MRI of the brain without and with contrast MRA of the head without contrast MRA of the neck without and with contrast History: deniz for PRESS, Comparison: 3/19/2015,  Technique:  Brain: Axial diffusion-weighted with ADC map, T2-weighted with fat saturation, T1-weighted and turboFLAIR and coronal T1-weighted images of the brain were obt     Myocardial infarction (H)     NSTEMI     Other alteration of consciousness 7/2007    see neuro consult 7/25/2007. MinCep THOMSON was neg. Does not have seizures.      Other and unspecified hyperlipidemia      Syncope 10/19/2009    related to BP medications     Unspecified essential  hypertension        Current Medications -   Current Outpatient Prescriptions:      ACETAMINOPHEN PO, Take 500-1,000 mg by mouth every 8 hours as needed for pain, Disp: , Rfl:      amLODIPine (NORVASC) 5 MG tablet, Take 1 tablet (5 mg) by mouth daily, Disp: 90 tablet, Rfl: 3     ASPIRIN PO, Take 81 mg by mouth daily, Disp: , Rfl:      atorvastatin (LIPITOR) 40 MG tablet, Take 1 tablet (40 mg) by mouth daily, Disp: 90 tablet, Rfl: 2     DULoxetine (CYMBALTA) 20 MG EC capsule, TAKE 1 CAPSULE (20 MG) BY MOUTH 2 TIMES DAILY, Disp: 180 capsule, Rfl: 3     estradiol (ESTRACE VAGINAL) 0.1 MG/GM cream, Place 2 g vaginally twice a week, Disp: 42.5 g, Rfl: 3     furosemide (LASIX) 20 MG tablet, TAKE 1 TABLET (20 MG) BY MOUTH DAILY, Disp: 90 tablet, Rfl: 2     halobetasol (ULTRAVATE) 0.05 % ointment, Apply topically three times a week, Disp: 1 Tube, Rfl: 3     lisinopril (PRINIVIL/ZESTRIL) 20 MG tablet, TAKE 1 TABLET (20 MG) BY MOUTH 2 TIMES DAILY, Disp: 180 tablet, Rfl: 3     loperamide (IMODIUM) 2 MG capsule, Take 2 mg by mouth 4 times daily as needed for diarrhea, Disp: , Rfl:      Lysine 500 MG TABS, Take 1 tablet by mouth daily as needed , Disp: , Rfl:      metoprolol tartrate (LOPRESSOR) 25 MG tablet, TAKE 1 TABLET (25 MG) BY MOUTH 2 TIMES DAILY, Disp: 180 tablet, Rfl: 2     Multiple Vitamins-Minerals (MULTIVITAMIN ADULT) CHEW, Take 2 chew tab by mouth daily, Disp: , Rfl:      nitroGLYcerin (NITROSTAT) 0.4 MG sublingual tablet, TAKE EVERY 5 MINUTES BY MOUTH X 3 DOSES, IF NOT EFFECTIVE CALL MD, Disp: 25 tablet, Rfl: 3     Probiotic Product (PROBIOTIC DAILY PO), Take 1 capsule by mouth daily, Disp: , Rfl:     Allergies -   Allergies   Allergen Reactions     Codeine Fatigue     Latex      Lidocaine Other (See Comments)     was one of 3 drugs given during GA that caused difficulty with anesthesia reversal     Sulfa Drugs Hives     Trimethoprim Hives     Valium Fatigue       Social History -   Social History     Social History      Marital status:      Spouse name: N/A     Number of children: N/A     Years of education: N/A     Occupational History     retired      Social History Main Topics     Smoking status: Never Smoker     Smokeless tobacco: Never Used     Alcohol use No     Drug use: No     Sexual activity: No     Other Topics Concern     Not on file     Social History Narrative    Lives with daughters family since June 2007. Moved from [place].       Family History -   Family History   Problem Relation Age of Onset     Cardiovascular Father      Cardiovascular Brother      Cardiovascular Mother      Hypertension Mother      Lipids Mother      Cardiovascular Sister      stents x 2-3     Hypertension Sister      Cardiovascular Sister      MI 64     Diabetes No family hx of        Review of Systems - As per HPI and PMHx, otherwise review of system review of the head and neck negative.    Physical Exam  /76  Pulse 91  Wt 73.5 kg (162 lb)  SpO2 93%  BMI 29.06 kg/m2  BMI: Body mass index is 29.06 kg/(m^2).    General - The patient is well nourished and well developed, and appears to have good nutritional status.  Alert and oriented to person and place, answers questions and cooperates with examination appropriately.    SKIN - No suspicious lesions or rashes.  Respiration - No respiratory distress.  Head and Face - Normocephalic and atraumatic, with no gross asymmetry noted of the contour of the facial features.  The facial nerve is intact, with strong symmetric movements.    Voice and Breathing - The patient was breathing comfortably without the use of accessory muscles. The patients voice was clear and strong, and had appropriate pitch and quality.    Ears - Bilateral pinna and EACs with normal appearing overlying skin. Tympanic membrane intact with good mobility on pneumatic otoscopy bilaterally. Bony landmarks of the ossicular chain are normal. The tympanic membranes are normal in appearance. No retraction, perforation,  or masses.  No fluid or purulence was seen in the external canal or the middle ear.     Eyes - Extraocular movements intact.  Sclera were not icteric or injected, conjunctiva were pink and moist.    Mouth - Examination of the oral cavity showed pink, healthy oral mucosa. No lesions or ulcerations noted.  The tongue was mobile and midline, and the dentition were in good condition.      Throat - The walls of the oropharynx were smooth, pink, moist, symmetric, and had no lesions or ulcerations.  The tonsillar pillars and soft palate were symmetric.  The uvula was midline on elevation.    Neck - Normal midline excursion of the laryngotracheal complex during swallowing.  Full range of motion on passive movement.  Palpation of the occipital, submental, submandibular, internal jugular chain, and supraclavicular nodes did not demonstrate any abnormal lymph nodes or masses.  The carotid pulse was palpable bilaterally.  Palpation of the thyroid was soft and smooth, with no nodules or goiter appreciated.  The trachea was mobile and midline.    Nose - External contour is symmetric, no gross deflection or scars.  Nasal mucosa is pink and moist with no abnormal mucus.  The septum was midline and non-obstructive, turbinates of normal size and position.  No polyps, masses, or purulence noted on examination.    Neuro - Nonfocal neuro exam is normal, CN 2 through 12 intact, normal gait and muscle tone.      Performed in clinic today:  No procedures preformed in clinic today      A/P - Monik Santiago is a 78 year old female with bilateral SNHL, left slightly worse than right, significantly worse word recognition score in the left versus right, and positional vertigo. I placed an order for patient to go through physical therapy for positional vertigo. Discussed with patient the possibility of an acoustic neuroma and to diagnosis this she would need an MRI. I also explained the possible treatments of an acoustic neuroma. At this time she  did not wish to pursue an MRI at this time. I recommend at minimum, a hearing aid in the left ear. Patient does wish to pursue possibly getting a hearing aid in the future. She was given the proper literature.        This document serves as a record of the services and decisions personally performed and made by Dr. Joni Ortiz MD. It was created on his behalf by Niru Jamil, a trained medical scribe. The creation of this document is based the provider's statements to the medical scribe.  Niru Jamil 10/22/2018    Provider:   The information in this document, created by the medical scribe for me, accurately reflects the services I personally performed and the decisions made by me. I have reviewed and approved this document for accuracy prior to leaving the patient care area.  Dr. Joni Ortiz MD 10/22/2018    Joni Ortiz MD

## 2018-10-22 ENCOUNTER — OFFICE VISIT (OUTPATIENT)
Dept: OTOLARYNGOLOGY | Facility: CLINIC | Age: 78
End: 2018-10-22
Payer: MEDICARE

## 2018-10-22 VITALS
WEIGHT: 162 LBS | OXYGEN SATURATION: 93 % | BODY MASS INDEX: 29.06 KG/M2 | HEART RATE: 91 BPM | DIASTOLIC BLOOD PRESSURE: 76 MMHG | SYSTOLIC BLOOD PRESSURE: 138 MMHG

## 2018-10-22 DIAGNOSIS — H90.3 ASYMMETRICAL SENSORINEURAL HEARING LOSS: Primary | ICD-10-CM

## 2018-10-22 DIAGNOSIS — H81.10 BENIGN PAROXYSMAL POSITIONAL VERTIGO, UNSPECIFIED LATERALITY: ICD-10-CM

## 2018-10-22 PROCEDURE — 99203 OFFICE O/P NEW LOW 30 MIN: CPT | Performed by: OTOLARYNGOLOGY

## 2018-10-22 NOTE — LETTER
10/22/2018         RE: Monik Santiago  [address] [address] [facility] [address]        Dear Colleague,    Thank you for referring your patient, Monik Santiago, to the [facility]. Please see a copy of my visit note below.    ENT Consultation    Monik Santiago who is a 78 year old female seen in consultation at the request of Cheryl Baca.      History of Present Illness - Monik Santiago is a 78 year old female who had an audiogram completed on 09/25/2018. Her audiogram showed that her left ear has more hearing loss than the right. She has assymetry in word recognition, right is at 92% and left is at 56%. Patient reports that she has a noticeable amount of hearing loss in the last 12 months. Patient has had problem with balance and vertigo for many years. She had 1 episode of vertigo lasting days 5+ years ago, now she has vertigo with movement at times. She has bilateral tinnitus that is intermittent. She does have a lump occasionally over the mastoid tip. No significant history of ear infections.      Body mass index is 29.06 kg/(m^2).    BP Readings from Last 1 Encounters:   10/22/18 138/76     BP noted to be well controlled today in office.     Monik IS NOT a smoker/uses chewing tobacco.       Past Medical History -   Past Medical History:   Diagnosis Date     Clostridium difficile diarrhea      Coronary artery disease      Generalized anxiety disorder      GERD (gastroesophageal reflux disease)      History of MRI of brain and brain stem 4/10/2015    MR BRAIN FOR STROKE COMPLETE W/O & W CONTRAST 4/9/2015 9:34 PM MRI of the brain without and with contrast MRA of the head without contrast MRA of the neck without and with contrast History: deniz for PRESS, Comparison: 3/19/2015,  Technique:  Brain: Axial diffusion-weighted with ADC map, T2-weighted with fat saturation, T1-weighted and turboFLAIR and coronal T1-weighted images of the brain were obt     Myocardial infarction (H)     NSTEMI      Other alteration of consciousness 7/2007    see neuro consult 7/25/2007. Rena THOMSON was neg. Does not have seizures.      Other and unspecified hyperlipidemia      Syncope 10/19/2009    related to BP medications     Unspecified essential hypertension        Current Medications -   Current Outpatient Prescriptions:      ACETAMINOPHEN PO, Take 500-1,000 mg by mouth every 8 hours as needed for pain, Disp: , Rfl:      amLODIPine (NORVASC) 5 MG tablet, Take 1 tablet (5 mg) by mouth daily, Disp: 90 tablet, Rfl: 3     ASPIRIN PO, Take 81 mg by mouth daily, Disp: , Rfl:      atorvastatin (LIPITOR) 40 MG tablet, Take 1 tablet (40 mg) by mouth daily, Disp: 90 tablet, Rfl: 2     DULoxetine (CYMBALTA) 20 MG EC capsule, TAKE 1 CAPSULE (20 MG) BY MOUTH 2 TIMES DAILY, Disp: 180 capsule, Rfl: 3     estradiol (ESTRACE VAGINAL) 0.1 MG/GM cream, Place 2 g vaginally twice a week, Disp: 42.5 g, Rfl: 3     furosemide (LASIX) 20 MG tablet, TAKE 1 TABLET (20 MG) BY MOUTH DAILY, Disp: 90 tablet, Rfl: 2     halobetasol (ULTRAVATE) 0.05 % ointment, Apply topically three times a week, Disp: 1 Tube, Rfl: 3     lisinopril (PRINIVIL/ZESTRIL) 20 MG tablet, TAKE 1 TABLET (20 MG) BY MOUTH 2 TIMES DAILY, Disp: 180 tablet, Rfl: 3     loperamide (IMODIUM) 2 MG capsule, Take 2 mg by mouth 4 times daily as needed for diarrhea, Disp: , Rfl:      Lysine 500 MG TABS, Take 1 tablet by mouth daily as needed , Disp: , Rfl:      metoprolol tartrate (LOPRESSOR) 25 MG tablet, TAKE 1 TABLET (25 MG) BY MOUTH 2 TIMES DAILY, Disp: 180 tablet, Rfl: 2     Multiple Vitamins-Minerals (MULTIVITAMIN ADULT) CHEW, Take 2 chew tab by mouth daily, Disp: , Rfl:      nitroGLYcerin (NITROSTAT) 0.4 MG sublingual tablet, TAKE EVERY 5 MINUTES BY MOUTH X 3 DOSES, IF NOT EFFECTIVE CALL MD, Disp: 25 tablet, Rfl: 3     Probiotic Product (PROBIOTIC DAILY PO), Take 1 capsule by mouth daily, Disp: , Rfl:     Allergies -   Allergies   Allergen Reactions     Codeine Fatigue     Latex       Lidocaine Other (See Comments)     was one of 3 drugs given during GA that caused difficulty with anesthesia reversal     Sulfa Drugs Hives     Trimethoprim Hives     Valium Fatigue       Social History -   Social History     Social History     Marital status:      Spouse name: N/A     Number of children: N/A     Years of education: N/A     Occupational History     retired      Social History Main Topics     Smoking status: Never Smoker     Smokeless tobacco: Never Used     Alcohol use No     Drug use: No     Sexual activity: No     Other Topics Concern     Not on file     Social History Narrative    Lives with daughters family since June 2007. Moved from [place].       Family History -   Family History   Problem Relation Age of Onset     Cardiovascular Father      Cardiovascular Brother      Cardiovascular Mother      Hypertension Mother      Lipids Mother      Cardiovascular Sister      stents x 2-3     Hypertension Sister      Cardiovascular Sister      MI 64     Diabetes No family hx of        Review of Systems - As per HPI and PMHx, otherwise review of system review of the head and neck negative.    Physical Exam  /76  Pulse 91  Wt 73.5 kg (162 lb)  SpO2 93%  BMI 29.06 kg/m2  BMI: Body mass index is 29.06 kg/(m^2).    General - The patient is well nourished and well developed, and appears to have good nutritional status.  Alert and oriented to person and place, answers questions and cooperates with examination appropriately.    SKIN - No suspicious lesions or rashes.  Respiration - No respiratory distress.  Head and Face - Normocephalic and atraumatic, with no gross asymmetry noted of the contour of the facial features.  The facial nerve is intact, with strong symmetric movements.    Voice and Breathing - The patient was breathing comfortably without the use of accessory muscles. The patients voice was clear and strong, and had appropriate pitch and quality.    Ears - Bilateral pinna and EACs  with normal appearing overlying skin. Tympanic membrane intact with good mobility on pneumatic otoscopy bilaterally. Bony landmarks of the ossicular chain are normal. The tympanic membranes are normal in appearance. No retraction, perforation, or masses.  No fluid or purulence was seen in the external canal or the middle ear.     Eyes - Extraocular movements intact.  Sclera were not icteric or injected, conjunctiva were pink and moist.    Mouth - Examination of the oral cavity showed pink, healthy oral mucosa. No lesions or ulcerations noted.  The tongue was mobile and midline, and the dentition were in good condition.      Throat - The walls of the oropharynx were smooth, pink, moist, symmetric, and had no lesions or ulcerations.  The tonsillar pillars and soft palate were symmetric.  The uvula was midline on elevation.    Neck - Normal midline excursion of the laryngotracheal complex during swallowing.  Full range of motion on passive movement.  Palpation of the occipital, submental, submandibular, internal jugular chain, and supraclavicular nodes did not demonstrate any abnormal lymph nodes or masses.  The carotid pulse was palpable bilaterally.  Palpation of the thyroid was soft and smooth, with no nodules or goiter appreciated.  The trachea was mobile and midline.    Nose - External contour is symmetric, no gross deflection or scars.  Nasal mucosa is pink and moist with no abnormal mucus.  The septum was midline and non-obstructive, turbinates of normal size and position.  No polyps, masses, or purulence noted on examination.    Neuro - Nonfocal neuro exam is normal, CN 2 through 12 intact, normal gait and muscle tone.      Performed in clinic today:  No procedures preformed in clinic today      A/P - Monik GIUSEPPE Santiago is a 78 year old female with bilateral SNHL, left slightly worse than right, significantly worse word recognition score in the left versus right, and positional vertigo. I placed an order for patient  to go through physical therapy for positional vertigo. Discussed with patient the possibility of an acoustic neuroma and to diagnosis this she would need an MRI. I also explained the possible treatments of an acoustic neuroma. At this time she did not wish to pursue an MRI at this time. I recommend at minimum, a hearing aid in the left ear. Patient does wish to pursue possibly getting a hearing aid in the future. She was given the proper literature.        This document serves as a record of the services and decisions personally performed and made by Dr. Joni Ortiz MD. It was created on his behalf by Niru Jamil, a trained medical scribe. The creation of this document is based the provider's statements to the medical scribe.  Niru Jamil 10/22/2018    Provider:   The information in this document, created by the medical scribe for me, accurately reflects the services I personally performed and the decisions made by me. I have reviewed and approved this document for accuracy prior to leaving the patient care area.  Dr. Joni Ortiz MD 10/22/2018    Joni Ortiz MD      Again, thank you for allowing me to participate in the care of your patient.        Sincerely,        Joni Ortiz MD, MD

## 2018-10-22 NOTE — MR AVS SNAPSHOT
After Visit Summary   10/22/2018    Monik Santiago    MRN: [id number]           Patient Information     Date Of Birth          1940        Visit Information        Provider Department      10/22/2018 8:45 AM Joni Ortiz MD [facility]         Follow-ups after your visit        Your next 10 appointments already scheduled     Nov 29, 2018 10:00 AM CST   Office Visit with Elida Pruett MD   [facility]    [address] [facility] [address] [phone]           Bring a current list of meds and any records pertaining to this visit. For Physicals, please bring immunization records and any forms needing to be filled out. Please arrive 10 minutes early to complete paperwork.              Who to contact     If you have questions or need follow up information about today's clinic visit or your schedule please contact [facility] directly at [phone].  Normal or non-critical lab and imaging results will be communicated to you by MyChart, letter or phone within 4 business days after the clinic has received the results. If you do not hear from us within 7 days, please contact the clinic through MyChart or phone. If you have a critical or abnormal lab result, we will notify you by phone as soon as possible.  Submit refill requests through [employer] or call your pharmacy and they will forward the refill request to us. Please allow 3 business days for your refill to be completed.          Additional Information About Your Visit        Care EveryWhere ID     This is your Care EveryWhere ID. This could be used by other organizations to access your [facility] medical records  [id number]        Your Vitals Were     Pulse Pulse Oximetry BMI (Body Mass Index)             91 93% 29.06 kg/m2          Blood Pressure from Last 3 Encounters:   10/22/18 138/76   10/09/18 130/70   09/17/18 136/68    Weight from Last 3 Encounters:    10/22/18 73.5 kg (162 lb)   10/09/18 73 kg (161 lb)   09/17/18 72.6 kg (160 lb)              Today, you had the following     No orders found for display       Primary Care Provider Office Phone # Fax #    Ken Mar [phone] [address] [facility] [address]        Equal Access to Services     [facility] SARAH : Hadii aad ku hadasho Soomaali, waaxda luqadaha, qaybta kaalmada adeegyada, waxay idiin hayaan adeeg kharash la'aan . So [facility] [phone].    ATENCIÓN: Si habla español, tiene a hamm disposición servicios gratuitos de asistencia lingüística. Geni al [phone].    We comply with applicable federal civil rights laws and [place] laws. We do not discriminate on the basis of race, color, national origin, age, disability, sex, sexual orientation, or gender identity.            Thank you!     Thank you for choosing [facility]  for your care. Our goal is always to provide you with excellent care. Hearing back from our patients is one way we can continue to improve our services. Please take a few minutes to complete the written survey that you may receive in the mail after your visit with us. Thank you!             Your Updated Medication List - Protect others around you: Learn how to safely use, store and throw away your medicines at www.disposemymeds.org.          This list is accurate as of 10/22/18  9:36 AM.  Always use your most recent med list.                   Brand Name Dispense Instructions for use Diagnosis    ACETAMINOPHEN PO      Take 500-1,000 mg by mouth every 8 hours as needed for pain        amLODIPine 5 MG tablet    NORVASC    90 tablet    Take 1 tablet (5 mg) by mouth daily    Hypertension goal BP (blood pressure) < 140/90       ASPIRIN PO      Take 81 mg by mouth daily        atorvastatin 40 MG tablet    LIPITOR    90 tablet    Take 1 tablet (40 mg) by mouth daily    ASCVD (arteriosclerotic cardiovascular disease), Hyperlipidemia LDL goal <70        DULoxetine 20 MG EC capsule    CYMBALTA    180 capsule    TAKE 1 CAPSULE (20 MG) BY MOUTH 2 TIMES DAILY    Adjustment disorder with mixed anxiety and depressed mood       estradiol 0.1 MG/GM cream    ESTRACE VAGINAL    42.5 g    Place 2 g vaginally twice a week    Atrophic vaginitis       furosemide 20 MG tablet    LASIX    90 tablet    TAKE 1 TABLET (20 MG) BY MOUTH DAILY    Swelling of lower limb       halobetasol 0.05 % ointment    ULTRAVATE    1 Tube    Apply topically three times a week    Lichen sclerosus et atrophicus of the vulva       lisinopril 20 MG tablet    PRINIVIL/ZESTRIL    180 tablet    TAKE 1 TABLET (20 MG) BY MOUTH 2 TIMES DAILY    Essential hypertension with goal blood pressure less than 140/90       loperamide 2 MG capsule    IMODIUM     Take 2 mg by mouth 4 times daily as needed for diarrhea        Lysine 500 MG Tabs      Take 1 tablet by mouth daily as needed        metoprolol tartrate 25 MG tablet    LOPRESSOR    180 tablet    TAKE 1 TABLET (25 MG) BY MOUTH 2 TIMES DAILY    Essential hypertension with goal blood pressure less than 140/90       MULTIVITAMIN ADULT Chew      Take 2 chew tab by mouth daily        nitroGLYcerin 0.4 MG sublingual tablet    NITROSTAT    25 tablet    TAKE EVERY 5 MINUTES BY MOUTH X 3 DOSES, IF NOT EFFECTIVE CALL MD    Atypical chest pain       PROBIOTIC DAILY PO      Take 1 capsule by mouth daily

## 2018-10-31 DIAGNOSIS — F43.23 ADJUSTMENT DISORDER WITH MIXED ANXIETY AND DEPRESSED MOOD: ICD-10-CM

## 2018-10-31 RX ORDER — DULOXETIN HYDROCHLORIDE 20 MG/1
CAPSULE, DELAYED RELEASE ORAL
Qty: 180 CAPSULE | Refills: 3 | OUTPATIENT
Start: 2018-10-31

## 2018-10-31 NOTE — TELEPHONE ENCOUNTER
"duloxetine  Last Written Prescription Date:  1/23/2018  Last Fill Quantity: 180,  # refills: 3   Last office visit: 09/04/2018 with prescribing provider:      Future Office Visit:   Next 5 appointments (look out 90 days)     Nov 29, 2018 10:00 AM CST   Office Visit with Elida Pruett MD   [facility] ([facility])    [address] [facility] [phone]                Requested Prescriptions   Pending Prescriptions Disp Refills     DULoxetine (CYMBALTA) 20 MG EC capsule 180 capsule 3    Serotonin-Norepinephrine Reuptake Inhibitors  Passed    10/31/2018 10:12 AM       Passed - Blood pressure under 140/90 in past 12 months    BP Readings from Last 3 Encounters:   10/22/18 138/76   10/09/18 130/70   09/17/18 136/68          Passed - Recent (12 mo) or future (30 days) visit within the authorizing provider's specialty    Patient had office visit in the last 12 months or has a visit in the next 30 days with authorizing provider or within the authorizing provider's specialty.  See \"Patient Info\" tab in inbasket, or \"Choose Columns\" in Meds & Orders section of the refill encounter.             Passed - Patient is age 18 or older       Passed - No active pregnancy on record       Passed - No positive pregnancy test in past 12 months        Still has refills on file with [facility]    Elisabet Briceño RN on 10/31/2018 at 5:26 PM    "

## 2018-10-31 NOTE — TELEPHONE ENCOUNTER
Fax received from [facility], [place], [place] requesting a refill of DULoxetine (CYMBALTA) 20 MG EC capsule on behalf of Monik Santiago  Last refill: 1/23/2018  # 180 with 3 refills at [facility], [place], [place].  Last office visit:  9/17/2018  Next office visit:  None scheduled    This is an appropriate refill, and has been e-prescribed. Laila Holly, CMA

## 2018-11-09 ENCOUNTER — TELEPHONE (OUTPATIENT)
Dept: INTERNAL MEDICINE | Facility: CLINIC | Age: 78
End: 2018-11-09

## 2018-11-09 NOTE — TELEPHONE ENCOUNTER
Reason for Call:  Form, our goal is to have forms completed with 72 hours, however, some forms may require a visit or additional information.    Type of letter, form or note:  handicap    Who is the form from?: Patient    Where did the form come from: Patient or family brought in       What clinic location was the form placed at?: [facility]    Where the form was placed: 's Box    What number is listed as a contact on the form?: [phone]       Additional comments: Jeff Sanderson will     Call taken on 11/9/2018 at 11:05 AM by Jamarcus Jerome

## 2018-11-18 DIAGNOSIS — I10 ESSENTIAL HYPERTENSION WITH GOAL BLOOD PRESSURE LESS THAN 140/90: ICD-10-CM

## 2018-11-20 RX ORDER — METOPROLOL TARTRATE 25 MG/1
TABLET, FILM COATED ORAL
Qty: 180 TABLET | Refills: 2 | Status: SHIPPED | OUTPATIENT
Start: 2018-11-20 | End: 2019-07-12

## 2018-11-20 NOTE — TELEPHONE ENCOUNTER
"Requested Prescriptions   Pending Prescriptions Disp Refills     metoprolol tartrate (LOPRESSOR) 25 MG tablet [Pharmacy Med Name: METOPROLOL TARTRATE 25 MG TAB] 180 tablet 2    Last Written Prescription Date:  2/23/18  Last Fill Quantity: 180,  # refills: 2   Last office visit: 9/17/2018 with prescribing provider:     Future Office Visit:   Next 5 appointments (look out 90 days)     Nov 29, 2018 10:00 AM CST   Office Visit with Elida Pruett MD   [facility] ([facility]    [address] [place] [address]   [phone]                Sig: TAKE 1 TABLET (25 MG) BY MOUTH 2 TIMES DAILY    Beta-Blockers Protocol Passed    11/18/2018  1:37 AM       Passed - Blood pressure under 140/90 in past 12 months    BP Readings from Last 3 Encounters:   10/22/18 138/76   10/09/18 130/70   09/17/18 136/68            Passed - Patient is age 6 or older       Passed - Recent (12 mo) or future (30 days) visit within the authorizing provider's specialty    Patient had office visit in the last 12 months or has a visit in the next 30 days with authorizing provider or within the authorizing provider's specialty.  See \"Patient Info\" tab in inbasket, or \"Choose Columns\" in Meds & Orders section of the refill encounter.              Prescription approved per [facility] Refill Protocol.    Barbara Colby RN    "

## 2018-11-29 ENCOUNTER — OFFICE VISIT (OUTPATIENT)
Dept: OBGYN | Facility: OTHER | Age: 78
End: 2018-11-29
Payer: MEDICARE

## 2018-11-29 VITALS
DIASTOLIC BLOOD PRESSURE: 70 MMHG | SYSTOLIC BLOOD PRESSURE: 136 MMHG | BODY MASS INDEX: 30.05 KG/M2 | WEIGHT: 167.5 LBS | HEART RATE: 64 BPM

## 2018-11-29 DIAGNOSIS — L90.0 LICHEN SCLEROSUS: Primary | ICD-10-CM

## 2018-11-29 PROCEDURE — 88305 TISSUE EXAM BY PATHOLOGIST: CPT | Mod: 26 | Performed by: OBSTETRICS & GYNECOLOGY

## 2018-11-29 PROCEDURE — 56605 BIOPSY OF VULVA/PERINEUM: CPT | Performed by: OBSTETRICS & GYNECOLOGY

## 2018-11-29 PROCEDURE — 88305 TISSUE EXAM BY PATHOLOGIST: CPT | Performed by: OBSTETRICS & GYNECOLOGY

## 2018-11-29 PROCEDURE — 99213 OFFICE O/P EST LOW 20 MIN: CPT | Mod: 25 | Performed by: OBSTETRICS & GYNECOLOGY

## 2018-11-29 NOTE — MR AVS SNAPSHOT
After Visit Summary   11/29/2018    Monik Santiago    MRN: [id number]           Patient Information     Date Of Birth          1940        Visit Information        Provider Department      11/29/2018 10:00 AM Elida Pruett MD [facility]        Today's Diagnoses     Lichen sclerosus    -  1       Follow-ups after your visit        Who to contact     If you have questions or need follow up information about today's clinic visit or your schedule please contact [facility] directly at [phone].  Normal or non-critical lab and imaging results will be communicated to you by MyChart, letter or phone within 4 business days after the clinic has received the results. If you do not hear from us within 7 days, please contact the clinic through MyChart or phone. If you have a critical or abnormal lab result, we will notify you by phone as soon as possible.  Submit refill requests through [employer] or call your pharmacy and they will forward the refill request to us. Please allow 3 business days for your refill to be completed.          Additional Information About Your Visit        Care EveryWhere ID     This is your Care EveryWhere ID. This could be used by other organizations to access your [place] medical records  [id number]        Your Vitals Were     Pulse BMI (Body Mass Index)                64 30.05 kg/m2           Blood Pressure from Last 3 Encounters:   11/29/18 136/70   10/22/18 138/76   10/09/18 130/70    Weight from Last 3 Encounters:   11/29/18 167 lb 8 oz (76 kg)   10/22/18 162 lb (73.5 kg)   10/09/18 161 lb (73 kg)              We Performed the Following     BIOPSY SKIN/SUBQ/MUC MEM, SINGLE LESION     Surgical pathology exam        Primary Care Provider Office Phone # Fax #    Ken Mar [phone]       [address] [facility] [address]        Equal Access to Services     DANIEL SMITH : vinh Whitten  yovany mayraalexx paboncesar yuriyfrancisca akhtar. So [facility] [phone].    ATENCIÓN: Si juan pendleton, tiene a hamm disposición servicios gratuitos de asistencia lingüística. Geni al [phone].    We comply with applicable federal civil rights laws and [place] laws. We do not discriminate on the basis of race, color, national origin, age, disability, sex, sexual orientation, or gender identity.            Thank you!     Thank you for choosing [facility]  for your care. Our goal is always to provide you with excellent care. Hearing back from our patients is one way we can continue to improve our services. Please take a few minutes to complete the written survey that you may receive in the mail after your visit with us. Thank you!             Your Updated Medication List - Protect others around you: Learn how to safely use, store and throw away your medicines at www.disposemymeds.org.          This list is accurate as of 11/29/18 10:48 AM.  Always use your most recent med list.                   Brand Name Dispense Instructions for use Diagnosis    ACETAMINOPHEN PO      Take 500-1,000 mg by mouth every 8 hours as needed for pain        amLODIPine 5 MG tablet    NORVASC    90 tablet    Take 1 tablet (5 mg) by mouth daily    Hypertension goal BP (blood pressure) < 140/90       ASPIRIN PO      Take 81 mg by mouth daily        atorvastatin 40 MG tablet    LIPITOR    90 tablet    Take 1 tablet (40 mg) by mouth daily    ASCVD (arteriosclerotic cardiovascular disease), Hyperlipidemia LDL goal <70       DULoxetine 20 MG capsule    CYMBALTA    180 capsule    TAKE 1 CAPSULE (20 MG) BY MOUTH 2 TIMES DAILY    Adjustment disorder with mixed anxiety and depressed mood       estradiol 0.1 MG/GM vaginal cream    ESTRACE VAGINAL    42.5 g    Place 2 g vaginally twice a week    Atrophic vaginitis       furosemide 20 MG tablet    LASIX    90 tablet    TAKE 1 TABLET (20 MG) BY MOUTH DAILY     Swelling of lower limb       halobetasol 0.05 % ointment    ULTRAVATE    1 Tube    Apply topically three times a week    Lichen sclerosus et atrophicus of the vulva       lisinopril 20 MG tablet    PRINIVIL/ZESTRIL    180 tablet    TAKE 1 TABLET (20 MG) BY MOUTH 2 TIMES DAILY    Essential hypertension with goal blood pressure less than 140/90       loperamide 2 MG capsule    IMODIUM     Take 2 mg by mouth 4 times daily as needed for diarrhea        Lysine 500 MG Tabs      Take 1 tablet by mouth daily as needed        metoprolol tartrate 25 MG tablet    LOPRESSOR    180 tablet    TAKE 1 TABLET (25 MG) BY MOUTH 2 TIMES DAILY    Essential hypertension with goal blood pressure less than 140/90       MULTIVITAMIN ADULT Chew      Take 2 chew tab by mouth daily        nitroGLYcerin 0.4 MG sublingual tablet    NITROSTAT    25 tablet    TAKE EVERY 5 MINUTES BY MOUTH X 3 DOSES, IF NOT EFFECTIVE CALL MD    Atypical chest pain       PROBIOTIC DAILY PO      Take 1 capsule by mouth daily

## 2018-11-29 NOTE — NURSING NOTE
"Chief Complaint   Patient presents with     Follow Up For     lichen Sclerosus Possible biopsy       Initial /70 (BP Location: Right arm, Patient Position: Chair, Cuff Size: Adult Regular)  Pulse 64  Wt 167 lb 8 oz (76 kg)  BMI 30.05 kg/m2 Estimated body mass index is 30.05 kg/(m^2) as calculated from the following:    Height as of 10/9/18: 5' 2.6\" (1.59 m).    Weight as of this encounter: 167 lb 8 oz (76 kg).  BP completed using cuff size: regular        The following HM Due: Dexa      The following patient reported/Care Every where data was sent to:  P ABSTRACT QUALITY INITIATIVES [30606]       Sade Tariq CMA  2018          "

## 2018-11-29 NOTE — PROGRESS NOTES
OB/GYN  2018      NAME:  Monik Santiago  PCP:  Mar, Ken RONAK  MRN:  [id number]    Impression / Plan     78 year old  with:      ICD-10-CM    1. Lichen sclerosus L90.0 BIOPSY SKIN/SUBQ/MUC MEM, SINGLE LESION     Surgical pathology exam       I will contact her with the results of the biopsy.   Bleeding precautions given  Continue halobetasol 2-3 times per week.  Instructions reviewed.  Continue estradiol cream twice weekly.  Petroleum jelly as needed.        Chief Complaint     Chief Complaint   Patient presents with     Follow Up For     lichen Sclerosus Possible biopsy       HPI     Monik Santiago is a  78 year old female who is seen for follow up.      I last saw the patient in 2018 for followup.  She was instructed to start vaginal estrogen and continue the halobetasol.  Replens and petroleum jelly was not helping the dryness as much as she would have liked.      She feels better since I saw her last, but continues to have some itching of the labia and where she tore during delivery.  Symptoms improved with estradiol cream and petroleum jelly is helping more.     In review:   Biopsy was done 10/19/17 for labial lesion and demonstrated lichen sclerosis.   FINAL DIAGNOSIS:   Labia biopsy, right majora/minora junction, 9:00:   - Lichen sclerosus.      Halobetasol was used due to insurance reasons.     Problem List     Patient Active Problem List    Diagnosis Date Noted     Chest pain, atypical 2012     Priority: High     ASCVD (arteriosclerotic cardiovascular disease) 2012     Priority: High     Chest pain 2017     Priority: Medium     RUQ abdominal pain 2016     Priority: Medium     S/P laparoscopic cholecystectomy 2016     Priority: Medium     History of MRI of brain and brain stem 04/10/2015     Priority: Medium     MR BRAIN FOR STROKE COMPLETE W/O & W CONTRAST 2015 9:34 PM  MRI of the brain without and with contrast  MRA of the head without  contrast  MRA of the neck without and with contrast  History: eval for PRESS,  Comparison: 3/19/2015,   Technique:   Brain: Axial diffusion-weighted with ADC map, T2-weighted with fat  saturation, T1-weighted and turboFLAIR and coronal T1-weighted images  of the brain were obtained without intravenous contrast. Following  intravenous administration of gadolinium, axial turboFLAIR and coronal  T1-weighted images of the brain were obtained.   MRA: 3D time-of-flight MR angiography of the major arteries at the  base of the brain was performed without contrast. 2D time-of-flight  MRA without contrast with superior and inferior saturation bands and  3D T1-weighted postgadolinium MRA of the neck/cervical vessels were  also obtained. 3-dimensional reconstructions were created of the MRA  of both the head and the neck, which were reviewed by the radiologist.  Contrast: 10mL Gadavist  Findings: No areas of restricted diffusion. Marked volume loss  particularly affecting frontal, parietal and medial temporal lobe.s  Moderate confluent periventricular deep white white matter FLAIR  hyperintensities similar in appearance to previous exam. Some apparent  faint postcontrast T1 hyperintensity in the left cerebellar hemisphere  adjacent to the fourth ventricle is favored to be related to artifact.  No abnormal contrast enhancement is noted. Acute intracranial  hemorrhage or extra-axial collection. There is no hydrocephalus.  MR angiogram of the head: Posterior circulation appears patent.  Evaluation of anterior circulation is markedly limited due to motion  artifact. Bilateral intracranial internal carotid arteries are grossly  patent.  MR angiography of the neck demonstrates patent origins of the carotid  and vertebral arteries. There are codominant vertebral arteries which  are patent throughout the neck. Bilateral common carotid arteries,  carotid bifurcations and internal carotid arteries are patent.  IMPRESSION  Impression:    No acute infarction acute intracranial hemorrhage or extra-axial  collection. No hydrocephalus.  Marked parenchymal volume loss particularly affecting the frontal  convexity, parietal lobes and bilateral medial temporal lobes.  Confluent white matter T2 hyperintensities in the periventricular deep  white matter are most compatible with moderate chronic microvascular  ischemic changes.  MR angiogram of the head is partially limited; particularly the  anterior circulation evaluation is limited.   Neck MR angiogram demonstrates no hemodynamically significant  stenosis.  I have personally reviewed the examination and initial interpretation  and I agree with the findings.  RAVINDER REYNOSO MD       Altered mental status 04/08/2015     Priority: Medium     Headache 04/08/2015     Priority: Medium     Problem list name updated by automated process. Provider to review       History of C.diff colitis 04/08/2015     Priority: Medium     Anxiety 04/08/2015     Priority: Medium     Lightheaded 03/07/2015     Priority: Medium     Lightheadedness 03/06/2015     Priority: Medium     Nausea without vomiting 02/06/2015     Priority: Medium     Problem list name updated by automated process. 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 Provider to review       Near syncope 05/20/2014     Priority: Medium     Coronary atherosclerosis of native coronary artery (VESSEL) 05/20/2014     Priority: Medium     Lichen sclerosus et atrophicus of the vulva 10/08/2013     Priority: Medium     CKD (chronic kidney disease) stage 3, GFR 30-59 ml/min (H) 11/19/2012     Priority: Medium     Acute worsening of stage 3 chronic kidney disease (H) 11/19/2012     Priority: Medium     Diagnosis updated by automated process. Provider to review and confirm.       NSTEMI (non-ST elevated myocardial infarction), history 10/06/2012     Priority: Medium     Acute myocardial infarction (H) 10/05/2012     Priority: Medium     Hypoxia 10/05/2012     Priority: Medium     Hypertension goal BP (blood pressure) < 140/90 05/02/2011     Priority: Medium     Osteoarthritis 09/13/2010     Priority: Medium     GERD (gastroesophageal reflux disease)      Priority: Medium     Other alteration of consciousness 07/01/2007     Priority: Medium     see neuro consult 7/25/2007       Systolic CHF, chronic (H) 11/20/2012     Priority: Low     Anemia 11/20/2012     Priority: Low     DVT prophylaxis 11/20/2012     Priority: Low     Advanced directives, counseling/discussion 05/16/2012     Priority: Low     Advance Directive received and scanned. Click on Code in the patient header to view. 5/16/2012          Hyperlipidemia LDL goal <70 10/31/2010     Priority: Low     Generalized anxiety disorder      Priority: Low       Medications     Current Outpatient Prescriptions   Medication     ACETAMINOPHEN PO     amLODIPine (NORVASC) 5 MG tablet     ASPIRIN PO     atorvastatin (LIPITOR) 40 MG tablet     DULoxetine (CYMBALTA) 20 MG EC capsule     estradiol (ESTRACE VAGINAL) 0.1 MG/GM cream     furosemide (LASIX) 20 MG tablet     halobetasol (ULTRAVATE) 0.05 % ointment     lisinopril (PRINIVIL/ZESTRIL) 20 MG tablet     loperamide (IMODIUM) 2 MG capsule     Lysine 500 MG TABS     metoprolol tartrate  (LOPRESSOR) 25 MG tablet     Multiple Vitamins-Minerals (MULTIVITAMIN ADULT) CHEW     nitroGLYcerin (NITROSTAT) 0.4 MG sublingual tablet     Probiotic Product (PROBIOTIC DAILY PO)     No current facility-administered medications for this visit.         Allergies     Allergies   Allergen Reactions     Codeine Fatigue     Latex      Lidocaine Other (See Comments)     was one of 3 drugs given during GA that caused difficulty with anesthesia reversal     Sulfa Drugs Hives     Trimethoprim Hives     Valium Fatigue       ROS     A  organ review of systems was asked and the pertinent positives and negatives are listed in the HPI.     Physical Exam   Vitals: /70 (BP Location: Right arm, Patient Position: Chair, Cuff Size: Adult Regular)  Pulse 64  Wt 167 lb 8 oz (76 kg)  BMI 30.05 kg/m2    General: Comfortable, no obvious distress  : Fusion of the labia bilaterally.  Loss of architecture due to lichen sclerosis and atrophy.  Thickened hypopigmented area from 8-9 oclock to the clitoris on the right.  No significant improvement since I last saw her.  May be somewhat thickened.  superficial vessel runs parallel to the right labia majora.  ecchymosis noted, stable.  Posterior fourchette with some thin, hypopigmented tissue.  Erosion of the posterior fourchette has improved with the estrogen use.   Extremities: No peripheral edema, nontender       Labs/Imaging       Labs were reviewed in Epic          10 minutes was spent with patient, more than 50% counseling and coordinating care, exclusive of biopsy.     Elida Pruett MD         PROCEDURE: Biopsy    After discussing the procedure and obtaining consent the patient was prepped in the usual fashion with betadine.  The area to be biopsied was injected with local anesthetic, and a 4 mm punch biopsy was used and the specimen removed with scissors.  The area was treated with silver nitrate and pressure.  She was observed for a period of time and hemostasis noted.       I was called back into the room while she was getting dressed.  She had significant bleeding that ran down her legs and onto the floor.  She was cleaned up and the biopsy site did not appear to be bleeding.  Silver nitrate was used as a precaution.    Precautions given.  She will use ice and pressure as needed.        The patient tolerated it well.     Biopsy site:  Right labia majora near the junction of the minora at about 8 oclock.

## 2018-12-03 LAB — COPATH REPORT: NORMAL

## 2018-12-03 NOTE — PROGRESS NOTES
Patient notified of the results.  She will use the clobetasol sparingly to the affected area as previously instructed (every night for about 3 weeks).  Then follow up with me in the office.  We will go to 2-3 times per week after that as long as she is healing ok.  Her daughter brings her to appointments and will be out of town, so after the new year is ok too.

## 2018-12-06 DIAGNOSIS — M79.89 SWELLING OF LOWER LIMB: ICD-10-CM

## 2018-12-07 RX ORDER — FUROSEMIDE 20 MG
TABLET ORAL
Qty: 90 TABLET | Refills: 1 | Status: SHIPPED | OUTPATIENT
Start: 2018-12-07 | End: 2019-05-06

## 2018-12-07 NOTE — TELEPHONE ENCOUNTER
"Requested Prescriptions   Pending Prescriptions Disp Refills     furosemide (LASIX) 20 MG tablet [Pharmacy Med Name: FUROSEMIDE 20 MG TABLET] 90 tablet 2    Last Written Prescription Date:  3/13/2018  Last Fill Quantity: 90,  # refills: 2   Last office visit: 9/14/2018 with prescribing provider:     Future Office Visit:     Sig: TAKE 1 TABLET (20 MG) BY MOUTH DAILY    Diuretics (Including Combos) Protocol Passed    12/6/2018  2:30 AM       Passed - Blood pressure under 140/90 in past 12 months    BP Readings from Last 3 Encounters:   11/29/18 136/70   10/22/18 138/76   10/09/18 130/70          Passed - Recent (12 mo) or future (30 days) visit within the authorizing provider's specialty    Patient had office visit in the last 12 months or has a visit in the next 30 days with authorizing provider or within the authorizing provider's specialty.  See \"Patient Info\" tab in inbasket, or \"Choose Columns\" in Meds & Orders section of the refill encounter.           Passed - Patient is age 18 or older       Passed - No active pregancy on record       Passed - Normal serum creatinine on file in past 12 months    Recent Labs   Lab Test  09/14/18   1139   CR  0.90           Passed - Normal serum potassium on file in past 12 months    Recent Labs   Lab Test  09/14/18   1139   POTASSIUM  4.2           Passed - Normal serum sodium on file in past 12 months    Recent Labs   Lab Test  09/14/18   1139   NA  144           Passed - No positive pregnancy test in past 12 months        Prescription approved per [facility] Refill Protocol.    Barbara Colby RN      "

## 2019-01-28 DIAGNOSIS — E78.5 HYPERLIPIDEMIA LDL GOAL <70: ICD-10-CM

## 2019-01-28 DIAGNOSIS — F43.23 ADJUSTMENT DISORDER WITH MIXED ANXIETY AND DEPRESSED MOOD: ICD-10-CM

## 2019-01-28 DIAGNOSIS — I25.10 ASCVD (ARTERIOSCLEROTIC CARDIOVASCULAR DISEASE): ICD-10-CM

## 2019-01-28 RX ORDER — ATORVASTATIN CALCIUM 40 MG/1
TABLET, FILM COATED ORAL
Qty: 90 TABLET | Refills: 0 | Status: SHIPPED | OUTPATIENT
Start: 2019-01-28 | End: 2019-04-27

## 2019-01-28 RX ORDER — DULOXETIN HYDROCHLORIDE 20 MG/1
CAPSULE, DELAYED RELEASE ORAL
Qty: 180 CAPSULE | Refills: 0 | Status: SHIPPED | OUTPATIENT
Start: 2019-01-28 | End: 2019-05-06

## 2019-01-28 NOTE — TELEPHONE ENCOUNTER
"Requested Prescriptions   Pending Prescriptions Disp Refills     DULoxetine (CYMBALTA) 20 MG capsule [Pharmacy Med Name: DULOXETINE HCL DR 20 MG CAP] 180 capsule 0    Last Written Prescription Date:  1/23/18  Last Fill Quantity: 180,  # refills: 3   Last office visit: 9/14/2018 with prescribing provider:     Future Office Visit:     Sig: TAKE 1 CAPSULE BY MOUTH TWICE A DAY    Serotonin-Norepinephrine Reuptake Inhibitors  Passed - 1/28/2019  1:14 AM       Passed - Blood pressure under 140/90 in past 12 months    BP Readings from Last 3 Encounters:   11/29/18 136/70   10/22/18 138/76   10/09/18 130/70          Passed - Recent (12 mo) or future (30 days) visit within the authorizing provider's specialty    Patient had office visit in the last 12 months or has a visit in the next 30 days with authorizing provider or within the authorizing provider's specialty.  See \"Patient Info\" tab in inbasket, or \"Choose Columns\" in Meds & Orders section of the refill encounter.           Passed - Medication is active on med list       Passed - Patient is age 18 or older       Passed - No active pregnancy on record       Passed - No positive pregnancy test in past 12 months   Prescription approved per [facility] Refill Protocol.           atorvastatin (LIPITOR) 40 MG tablet [Pharmacy Med Name: ATORVASTATIN 40 MG TABLET] 90 tablet 0    Last Written Prescription Date:  4/20/18  Last Fill Quantity: 90,  # refills: 2   Last office visit: 9/17/2018 with prescribing provider:     Future Office Visit:     Sig: TAKE 1 TABLET BY MOUTH EVERY DAY    Statins Protocol Passed - 1/28/2019  1:14 AM       Passed - LDL on file in past 12 months    Recent Labs   Lab Test 09/14/18  1139   LDL 41          Passed - No abnormal creatine kinase in past 12 months    Recent Labs   Lab Test 05/21/14  0605   CKT 44           Passed - Recent (12 mo) or future (30 days) visit within the authorizing provider's specialty    Patient had office visit in the last 12 months " "or has a visit in the next 30 days with authorizing provider or within the authorizing provider's specialty.  See \"Patient Info\" tab in inbasket, or \"Choose Columns\" in Meds & Orders section of the refill encounter.           Passed - Medication is active on med list       Passed - Patient is age 18 or older       Passed - No active pregnancy on record       Passed - No positive pregnancy test in past 12 months      Prescription approved per [facility] [place] [facility] Refill Protocol.    Barbara Colby RN        "

## 2019-03-07 ENCOUNTER — APPOINTMENT (OUTPATIENT)
Dept: CT IMAGING | Facility: CLINIC | Age: 79
End: 2019-03-07
Attending: FAMILY MEDICINE
Payer: MEDICARE

## 2019-03-07 ENCOUNTER — HOSPITAL ENCOUNTER (EMERGENCY)
Facility: CLINIC | Age: 79
Discharge: HOME | End: 2019-03-07
Attending: FAMILY MEDICINE | Admitting: FAMILY MEDICINE
Payer: MEDICARE

## 2019-03-07 VITALS
HEART RATE: 67 BPM | DIASTOLIC BLOOD PRESSURE: 67 MMHG | WEIGHT: 155 LBS | BODY MASS INDEX: 27.46 KG/M2 | OXYGEN SATURATION: 96 % | TEMPERATURE: 98.2 F | SYSTOLIC BLOOD PRESSURE: 126 MMHG | RESPIRATION RATE: 18 BRPM | HEIGHT: 63 IN

## 2019-03-07 DIAGNOSIS — N20.1 LEFT URETERAL STONE: ICD-10-CM

## 2019-03-07 DIAGNOSIS — R10.30 LOWER ABDOMINAL PAIN: ICD-10-CM

## 2019-03-07 LAB
ALBUMIN SERPL-MCNC: 3.3 G/DL (ref 3.4–5)
ALBUMIN UR-MCNC: NEGATIVE MG/DL
ALP SERPL-CCNC: 101 U/L (ref 40–150)
ALT SERPL W P-5'-P-CCNC: 13 U/L (ref 0–50)
ANION GAP SERPL CALCULATED.3IONS-SCNC: 8 MMOL/L (ref 3–14)
APPEARANCE UR: CLEAR
AST SERPL W P-5'-P-CCNC: 19 U/L (ref 0–45)
BASOPHILS # BLD AUTO: 0.1 10E9/L (ref 0–0.2)
BASOPHILS NFR BLD AUTO: 0.6 %
BILIRUB SERPL-MCNC: 0.4 MG/DL (ref 0.2–1.3)
BILIRUB UR QL STRIP: NEGATIVE
BUN SERPL-MCNC: 24 MG/DL (ref 7–30)
CALCIUM SERPL-MCNC: 9.4 MG/DL (ref 8.5–10.1)
CHLORIDE SERPL-SCNC: 110 MMOL/L (ref 94–109)
CO2 SERPL-SCNC: 24 MMOL/L (ref 20–32)
COLOR UR AUTO: YELLOW
CREAT SERPL-MCNC: 0.89 MG/DL (ref 0.52–1.04)
DIFFERENTIAL METHOD BLD: NORMAL
EOSINOPHIL NFR BLD AUTO: 1.8 %
ERYTHROCYTE [DISTWIDTH] IN BLOOD BY AUTOMATED COUNT: 14.6 % (ref 10–15)
GFR SERPL CREATININE-BSD FRML MDRD: 62 ML/MIN/{1.73_M2}
GLUCOSE SERPL-MCNC: 133 MG/DL (ref 70–99)
GLUCOSE UR STRIP-MCNC: NEGATIVE MG/DL
HCT VFR BLD AUTO: 37.3 % (ref 35–47)
HGB BLD-MCNC: 12.1 G/DL (ref 11.7–15.7)
HGB UR QL STRIP: ABNORMAL
IMM GRANULOCYTES # BLD: 0 10E9/L (ref 0–0.4)
IMM GRANULOCYTES NFR BLD: 0.2 %
KETONES UR STRIP-MCNC: NEGATIVE MG/DL
LEUKOCYTE ESTERASE UR QL STRIP: ABNORMAL
LYMPHOCYTES # BLD AUTO: 1.1 10E9/L (ref 0.8–5.3)
LYMPHOCYTES NFR BLD AUTO: 13.4 %
MCH RBC QN AUTO: 29.7 PG (ref 26.5–33)
MCHC RBC AUTO-ENTMCNC: 32.4 G/DL (ref 31.5–36.5)
MCV RBC AUTO: 91 FL (ref 78–100)
MONOCYTES # BLD AUTO: 0.5 10E9/L (ref 0–1.3)
MONOCYTES NFR BLD AUTO: 5.7 %
MUCOUS THREADS #/AREA URNS LPF: PRESENT /LPF
NEUTROPHILS # BLD AUTO: 6.5 10E9/L (ref 1.6–8.3)
NEUTROPHILS NFR BLD AUTO: 78.3 %
NITRATE UR QL: NEGATIVE
NRBC # BLD AUTO: 0 10*3/UL
NRBC BLD AUTO-RTO: 0 /100
PH UR STRIP: 7 PH (ref 5–7)
PLATELET # BLD AUTO: 225 10E9/L (ref 150–450)
POTASSIUM SERPL-SCNC: 4.4 MMOL/L (ref 3.4–5.3)
PROT SERPL-MCNC: 6.8 G/DL (ref 6.8–8.8)
RBC # BLD AUTO: 4.08 10E12/L (ref 3.8–5.2)
RBC #/AREA URNS AUTO: 45 /HPF (ref 0–2)
SODIUM SERPL-SCNC: 142 MMOL/L (ref 133–144)
SOURCE: ABNORMAL
SP GR UR STRIP: 1.01 (ref 1–1.03)
SQUAMOUS #/AREA URNS AUTO: <1 /HPF (ref 0–1)
UROBILINOGEN UR STRIP-MCNC: 0 MG/DL (ref 0–2)
WBC # BLD AUTO: 8.3 10E9/L (ref 4–11)
WBC #/AREA URNS AUTO: 1 /HPF (ref 0–5)

## 2019-03-07 PROCEDURE — 74176 CT ABD & PELVIS W/O CONTRAST: CPT

## 2019-03-07 PROCEDURE — 81001 URINALYSIS AUTO W/SCOPE: CPT | Performed by: FAMILY MEDICINE

## 2019-03-07 PROCEDURE — 96361 HYDRATE IV INFUSION ADD-ON: CPT | Performed by: FAMILY MEDICINE

## 2019-03-07 PROCEDURE — 99285 EMERGENCY DEPT VISIT HI MDM: CPT | Mod: Z6 | Performed by: FAMILY MEDICINE

## 2019-03-07 PROCEDURE — 85025 COMPLETE CBC W/AUTO DIFF WBC: CPT | Performed by: FAMILY MEDICINE

## 2019-03-07 PROCEDURE — 96376 TX/PRO/DX INJ SAME DRUG ADON: CPT | Performed by: FAMILY MEDICINE

## 2019-03-07 PROCEDURE — 25000128 H RX IP 250 OP 636: Performed by: FAMILY MEDICINE

## 2019-03-07 PROCEDURE — 96375 TX/PRO/DX INJ NEW DRUG ADDON: CPT | Performed by: FAMILY MEDICINE

## 2019-03-07 PROCEDURE — 80053 COMPREHEN METABOLIC PANEL: CPT | Performed by: FAMILY MEDICINE

## 2019-03-07 PROCEDURE — 99285 EMERGENCY DEPT VISIT HI MDM: CPT | Mod: 25 | Performed by: FAMILY MEDICINE

## 2019-03-07 PROCEDURE — 96374 THER/PROPH/DIAG INJ IV PUSH: CPT | Performed by: FAMILY MEDICINE

## 2019-03-07 RX ORDER — KETOROLAC TROMETHAMINE 15 MG/ML
15 INJECTION, SOLUTION INTRAMUSCULAR; INTRAVENOUS ONCE
Status: COMPLETED | OUTPATIENT
Start: 2019-03-07 | End: 2019-03-07

## 2019-03-07 RX ORDER — ONDANSETRON 2 MG/ML
4 INJECTION INTRAMUSCULAR; INTRAVENOUS ONCE
Status: COMPLETED | OUTPATIENT
Start: 2019-03-07 | End: 2019-03-07

## 2019-03-07 RX ORDER — ONDANSETRON 4 MG/1
4 TABLET, ORALLY DISINTEGRATING ORAL EVERY 6 HOURS PRN
Qty: 10 TABLET | Refills: 0 | Status: SHIPPED | OUTPATIENT
Start: 2019-03-07 | End: 2019-07-12

## 2019-03-07 RX ORDER — TAMSULOSIN HYDROCHLORIDE 0.4 MG/1
0.4 CAPSULE ORAL DAILY
Qty: 7 CAPSULE | Refills: 0 | Status: SHIPPED | OUTPATIENT
Start: 2019-03-07 | End: 2019-03-07

## 2019-03-07 RX ORDER — OXYCODONE HYDROCHLORIDE 5 MG/1
5 TABLET ORAL EVERY 4 HOURS PRN
Qty: 12 TABLET | Refills: 0 | Status: SHIPPED | OUTPATIENT
Start: 2019-03-07 | End: 2019-07-12

## 2019-03-07 RX ORDER — TAMSULOSIN HYDROCHLORIDE 0.4 MG/1
0.4 CAPSULE ORAL DAILY
Qty: 7 CAPSULE | Refills: 0 | Status: SHIPPED | OUTPATIENT
Start: 2019-03-07 | End: 2019-07-12

## 2019-03-07 RX ORDER — HYDROMORPHONE HYDROCHLORIDE 1 MG/ML
0.5 INJECTION, SOLUTION INTRAMUSCULAR; INTRAVENOUS; SUBCUTANEOUS
Status: DISCONTINUED | OUTPATIENT
Start: 2019-03-07 | End: 2019-03-07 | Stop reason: HOSPADM

## 2019-03-07 RX ADMIN — Medication 0.5 MG: at 07:49

## 2019-03-07 RX ADMIN — ONDANSETRON 4 MG: 2 INJECTION INTRAMUSCULAR; INTRAVENOUS at 07:45

## 2019-03-07 RX ADMIN — SODIUM CHLORIDE 1000 ML: 9 INJECTION, SOLUTION INTRAVENOUS at 07:27

## 2019-03-07 RX ADMIN — KETOROLAC TROMETHAMINE 15 MG: 15 INJECTION, SOLUTION INTRAMUSCULAR; INTRAVENOUS at 08:35

## 2019-03-07 RX ADMIN — Medication 0.5 MG: at 09:34

## 2019-03-07 ASSESSMENT — MIFFLIN-ST. JEOR: SCORE: 1152.21

## 2019-03-07 NOTE — DISCHARGE INSTRUCTIONS
You have a 2 mm kidney stone in the left ureter, almost entering the bladder.  Push lots of water.  Take oxycodone 1 tablet every 4 hours as needed for pain.  Add Zofran 4 mg every 6 hours as needed for nausea.  Begin Flomax 0.4 mg daily for 7 days to help expedite passage of the stone.  Follow-up in the clinic in 3 days if not improving.

## 2019-03-07 NOTE — ED AVS SNAPSHOT
[facility] Emergency Department  [address] [place] [address]  Phone:  [phone]  Fax:  [phone]                                    Monik Santiago   MRN: [id number]    Department:  [facility] Emergency Department   Date of Visit:  3/7/2019           After Visit Summary Signature Page    I have received my discharge instructions, and my questions have been answered. I have discussed any challenges I see with this plan with the nurse or doctor.    ..........................................................................................................................................  Patient/Patient Representative Signature      ..........................................................................................................................................  Patient Representative Print Name and Relationship to Patient    ..................................................               ................................................  Date                                   Time    ..........................................................................................................................................  Reviewed by Signature/Title    ...................................................              ..............................................  Date                                               Time          22EPIC Rev 08/18

## 2019-03-07 NOTE — ED PROVIDER NOTES
[facility] ED Provider Note   Patient: Monik Santiago  MRN #:  [id number]  Date of Visit: March 7, 2019    CC:     Chief Complaint   Patient presents with     Abdominal Pain     HPI:  Monik Santiago is a 78 year old female who presented to the emergency department with acute onset of lower abdominal pain that started around 3:00 this morning.  Patient initially thought that she needed to have a bowel movement but she did not.  The pain is described as intense, and rated 10/10.  There has been no exacerbating or alleviating factors.  She has accompanying nausea and vomiting.  Patient had her last bowel movement yesterday and it was normal.  She has had a previous surgeries for umbilical hernia and hiatal hernia.  She has a history of microscopic colitis with typical symptoms of diarrhea but not pain like this.  Patient denies any urinary symptoms, unilateral flank pain, fever, chills, chest pain, shortness of breath.  She is accompanied by her daughter Scarlet.    Problem List:  Patient Active Problem List    Diagnosis Date Noted     Chest pain, atypical 11/19/2012     Priority: High     ASCVD (arteriosclerotic cardiovascular disease) 11/19/2012     Priority: High     Chest pain 08/17/2017     Priority: Medium     RUQ abdominal pain 12/03/2016     Priority: Medium     S/P laparoscopic cholecystectomy 12/03/2016     Priority: Medium     History of MRI of brain and brain stem 04/10/2015     Priority: Medium     MR BRAIN FOR STROKE COMPLETE W/O & W CONTRAST 4/9/2015 9:34 PM  MRI of the brain without and with contrast  MRA of the head without contrast  MRA of the neck without and with contrast  History: deniz for MARITZA,  Comparison: 3/19/2015,   Technique:   Brain: Axial diffusion-weighted with ADC map, T2-weighted with fat  saturation, T1-weighted and turboFLAIR and coronal T1-weighted images  of the brain were obtained without intravenous contrast.  Following  intravenous administration of gadolinium, axial turboFLAIR and coronal  T1-weighted images of the brain were obtained.   MRA: 3D time-of-flight MR angiography of the major arteries at the  base of the brain was performed without contrast. 2D time-of-flight  MRA without contrast with superior and inferior saturation bands and  3D T1-weighted postgadolinium MRA of the neck/cervical vessels were  also obtained. 3-dimensional reconstructions were created of the MRA  of both the head and the neck, which were reviewed by the radiologist.  Contrast: 10mL Gadavist  Findings: No areas of restricted diffusion. Marked volume loss  particularly affecting frontal, parietal and medial temporal lobe.s  Moderate confluent periventricular deep white white matter FLAIR  hyperintensities similar in appearance to previous exam. Some apparent  faint postcontrast T1 hyperintensity in the left cerebellar hemisphere  adjacent to the fourth ventricle is favored to be related to artifact.  No abnormal contrast enhancement is noted. Acute intracranial  hemorrhage or extra-axial collection. There is no hydrocephalus.  MR angiogram of the head: Posterior circulation appears patent.  Evaluation of anterior circulation is markedly limited due to motion  artifact. Bilateral intracranial internal carotid arteries are grossly  patent.  MR angiography of the neck demonstrates patent origins of the carotid  and vertebral arteries. There are codominant vertebral arteries which  are patent throughout the neck. Bilateral common carotid arteries,  carotid bifurcations and internal carotid arteries are patent.  IMPRESSION  Impression:   No acute infarction acute intracranial hemorrhage or extra-axial  collection. No hydrocephalus.  Marked parenchymal volume loss particularly affecting the frontal  convexity, parietal lobes and bilateral medial temporal lobes.  Confluent white matter T2 hyperintensities in the periventricular deep  white matter  are most compatible with moderate chronic microvascular  ischemic changes.  MR angiogram of the head is partially limited; particularly the  anterior circulation evaluation is limited.   Neck MR angiogram demonstrates no hemodynamically significant  stenosis.  I have personally reviewed the examination and initial interpretation  and I agree with the findings.  RAVINDER REYNOSO MD       Altered mental status 04/08/2015     Priority: Medium     Headache 04/08/2015     Priority: Medium     Problem list name updated by automated process. Provider to review       History of C.diff colitis 04/08/2015     Priority: Medium     Anxiety 04/08/2015     Priority: Medium     Lightheaded 03/07/2015     Priority: Medium     Lightheadedness 03/06/2015     Priority: Medium     Nausea without vomiting 02/06/2015     Priority: Medium     Problem list name updated by automated process. Provider to review       Recent repair of hiatal hernia 1/30/15 02/06/2015     Priority: Medium     Urine retention - singh placed 2/4/15 02/06/2015     Priority: Medium     Bilateral leg edema 02/06/2015     Priority: Medium     Old myocardial infarction 2012 02/06/2015     Priority: Medium     Pseudoseizure 01/30/2015     Priority: Medium     Acidosis-metabolic 01/30/2015     Priority: Medium     Syncope 06/16/2014     Priority: Medium     Health Care Home 05/27/2014     Priority: Medium     State Tier Level:  Tier 3  Status:  Closed  Care Coordinator:  Michelle Gaytan RN, BSN    See Letters for [facility] Care Plan             Abdominal pain, unspecified abdominal location 05/21/2014     Priority: Medium     Problem list name updated by automated process. Provider to review       Near syncope 05/20/2014     Priority: Medium     Coronary atherosclerosis of native coronary artery (VESSEL) 05/20/2014     Priority: Medium     Lichen sclerosus et atrophicus of the vulva 10/08/2013     Priority: Medium     CKD (chronic kidney disease) stage 3, GFR 30-59 ml/min (H)  11/19/2012     Priority: Medium     Acute worsening of stage 3 chronic kidney disease (H) 11/19/2012     Priority: Medium     Diagnosis updated by automated process. Provider to review and confirm.       NSTEMI (non-ST elevated myocardial infarction), history 10/06/2012     Priority: Medium     Acute myocardial infarction (H) 10/05/2012     Priority: Medium     Hypoxia 10/05/2012     Priority: Medium     Hypertension goal BP (blood pressure) < 140/90 05/02/2011     Priority: Medium     Osteoarthritis 09/13/2010     Priority: Medium     GERD (gastroesophageal reflux disease)      Priority: Medium     Other alteration of consciousness 07/01/2007     Priority: Medium     see neuro consult 7/25/2007       Systolic CHF, chronic (H) 11/20/2012     Priority: Low     Anemia 11/20/2012     Priority: Low     DVT prophylaxis 11/20/2012     Priority: Low     Advanced directives, counseling/discussion 05/16/2012     Priority: Low     Advance Directive received and scanned. Click on Code in the patient header to view. 5/16/2012          Hyperlipidemia LDL goal <70 10/31/2010     Priority: Low     Generalized anxiety disorder      Priority: Low       Past Medical History:   Diagnosis Date     Clostridium difficile diarrhea      Coronary artery disease      Generalized anxiety disorder      GERD (gastroesophageal reflux disease)      History of MRI of brain and brain stem 4/10/2015     Myocardial infarction (H)      Other alteration of consciousness 7/2007     Other and unspecified hyperlipidemia      Syncope 10/19/2009     Unspecified essential hypertension        MEDS:     ACETAMINOPHEN PO   amLODIPine (NORVASC) 5 MG tablet   ASPIRIN PO   atorvastatin (LIPITOR) 40 MG tablet   DULoxetine (CYMBALTA) 20 MG capsule   estradiol (ESTRACE VAGINAL) 0.1 MG/GM cream   furosemide (LASIX) 20 MG tablet   halobetasol (ULTRAVATE) 0.05 % ointment   lisinopril (PRINIVIL/ZESTRIL) 20 MG tablet   loperamide (IMODIUM) 2 MG capsule   Lysine 500 MG  TABS   metoprolol tartrate (LOPRESSOR) 25 MG tablet   Multiple Vitamins-Minerals (MULTIVITAMIN ADULT) CHEW   nitroGLYcerin (NITROSTAT) 0.4 MG sublingual tablet   Probiotic Product (PROBIOTIC DAILY PO)       ALLERGIES:    Allergies   Allergen Reactions     Codeine Fatigue     Latex      Lidocaine Other (See Comments)     was one of 3 drugs given during GA that caused difficulty with anesthesia reversal     Sulfa Drugs Hives     Trimethoprim Hives     Valium Fatigue       Past Surgical History:   Procedure Laterality Date     C NONSPECIFIC PROCEDURE      abd hernia repair     C TOTAL KNEE ARTHROPLASTY  08/23/10    Right knee     Cardiac stents       COLONOSCOPY N/A 2/17/2016    Procedure: COMBINED COLONOSCOPY, SINGLE OR MULTIPLE BIOPSY/POLYPECTOMY BY BIOPSY;  Surgeon: Jose Gan MD;  Location: [facility]     ESOPHAGOSCOPY, GASTROSCOPY, DUODENOSCOPY (EGD), COMBINED N/A 12/17/2014    Procedure: COMBINED ESOPHAGOSCOPY, GASTROSCOPY, DUODENOSCOPY (EGD);  Surgeon: Kaz Mccoy MD;  Location: [facility]     HC REMV CATARACT EXTRACAP,INSERT LENS,COMP      darrian     HC UGI ENDOSCOPY DIAG W BIOPSY  12/16/09     HC YAG LASER CAPSULOTOMY  9/17/2009    Right eye     HEART CATH, ANGIOPLASTY  10/03/2012    Stent of LAD     HEART CATH, ANGIOPLASTY  05/17/2014     CAD, patent stent of LAD     LAPAROSCOPIC CHOLECYSTECTOMY N/A 12/3/2016    Procedure: LAPAROSCOPIC CHOLECYSTECTOMY;  Surgeon: Viral Meyers MD;  Location: [facility]     LAPAROSCOPIC HERNIORRHAPHY HIATAL N/A 1/30/2015    Procedure: LAPAROSCOPIC HERNIORRHAPHY HIATAL;  Surgeon: Chase Camara MD;  Location: [facility]     LAPAROSCOPIC NISSEN FUNDOPLICATION N/A 1/30/2015    Procedure: LAPAROSCOPIC NISSEN FUNDOPLICATION;  Surgeon: Chase Camara MD;  Location: [facility]     MOHS MICROGRAPHIC PROCEDURE  09/14/11    left upper forehead-09/14/11       Social History     Tobacco Use     Smoking status: Never Smoker     Smokeless tobacco: Never Used   Substance Use  "Topics     Alcohol use: No     Alcohol/week: 0.0 oz     Drug use: No         Review of Systems   Except as noted in HPI, all other systems were reviewed and are negative    Physical Exam     Vitals were reviewed  Patient Vitals for the past 8 hrs:   BP Temp Temp src Heart Rate Resp SpO2 Height Weight   03/07/19 0648 149/66 98.2  F (36.8  C) Oral 63 18 97 % 1.6 m (5' 3\") 70.3 kg (155 lb)     GENERAL APPEARANCE: Alert and oriented x3, moderate distress due to pain  FACE: normal facies  EYES: Pupils are equal; no scleral icterus  HENT: normal external exam  NECK: no adenopathy or asymmetry  RESP: normal respiratory effort; clear breath sounds bilaterally  CV: regular rate and rhythm; no significant murmurs, gallops or rubs  ABD: soft, lower abdominal and suprapubic tenderness; no CVA tenderness no rebound or guarding; bowel sounds are normal  MS: no gross deformities noted; normal muscle tone.  EXT: No calf tenderness; no pitting edema  SKIN: no worrisome rash  NEURO: no facial droop; no focal deficits, speech is normal        Available Lab/Imaging Results     Results for orders placed or performed during the hospital encounter of 03/07/19 (from the past 24 hour(s))   CBC with platelets differential   Result Value Ref Range    WBC 8.3 4.0 - 11.0 10e9/L    RBC Count 4.08 3.8 - 5.2 10e12/L    Hemoglobin 12.1 11.7 - 15.7 g/dL    Hematocrit 37.3 35.0 - 47.0 %    MCV 91 78 - 100 fl    MCH 29.7 26.5 - 33.0 pg    MCHC 32.4 31.5 - 36.5 g/dL    RDW 14.6 10.0 - 15.0 %    Platelet Count 225 150 - 450 10e9/L    Diff Method Automated Method     % Neutrophils 78.3 %    % Lymphocytes 13.4 %    % Monocytes 5.7 %    % Eosinophils 1.8 %    % Basophils 0.6 %    % Immature Granulocytes 0.2 %    Nucleated RBCs 0 0 /100    Absolute Neutrophil 6.5 1.6 - 8.3 10e9/L    Absolute Lymphocytes 1.1 0.8 - 5.3 10e9/L    Absolute Monocytes 0.5 0.0 - 1.3 10e9/L    Absolute Basophils 0.1 0.0 - 0.2 10e9/L    Abs Immature Granulocytes 0.0 0 - 0.4 10e9/L "    Absolute Nucleated RBC 0.0    Comprehensive metabolic panel   Result Value Ref Range    Sodium 142 133 - 144 mmol/L    Potassium 4.4 3.4 - 5.3 mmol/L    Chloride 110 (H) 94 - 109 mmol/L    Carbon Dioxide 24 20 - 32 mmol/L    Anion Gap 8 3 - 14 mmol/L    Glucose 133 (H) 70 - 99 mg/dL    Urea Nitrogen 24 7 - 30 mg/dL    Creatinine 0.89 0.52 - 1.04 mg/dL    GFR Estimate 62 >60 mL/min/[1.73_m2]    GFR Estimate If Black 72 >60 mL/min/[1.73_m2]    Calcium 9.4 8.5 - 10.1 mg/dL    Bilirubin Total 0.4 0.2 - 1.3 mg/dL    Albumin 3.3 (L) 3.4 - 5.0 g/dL    Protein Total 6.8 6.8 - 8.8 g/dL    Alkaline Phosphatase 101 40 - 150 U/L    ALT 13 0 - 50 U/L    AST 19 0 - 45 U/L   Abd/pelvis CT - no contrast - Stone Protocol    Narrative    CT ABDOMEN AND PELVIS WITHOUT CONTRAST  3/7/2019 8:15 AM     HISTORY: Abdominal pain, unspecified.    Radiation dose for this scan was reduced using automated exposure  control, adjustment of the mA and/or kV according to patient size, or  iterative reconstruction technique.    FINDINGS: Coronary artery calcifications are noted. Cholecystectomy  surgical clips are noted. There is a 0.2 cm stone in the region of the  left ureterovesical junction. Although the left ureter is difficult to  trace within the pelvis, given mild left hydronephrosis and prominence  of the left ureter further proximally, this is suspicious for a  ureterovesical junction stone. There is prominence of the right  colonic wall presumably related to relative decompression. No evidence  of bowel obstruction. There is no free peritoneal fluid or air. No  pericolonic inflammatory stranding. Moderate-sized hiatal hernia is  noted. Extensive atherosclerotic calcifications are noted within the  abdomen.      Impression    IMPRESSION:  1. 0.2 cm left ureterovesical junction stone with mild left  hydronephrosis and ureteral dilatation.  2. Atherosclerotic calcifications within the abdomen and coronary  arteries.  3.  Cholecystectomy.  4. Prominence of the right colonic wall, presumably related to  relative decompression. No pericolonic inflammatory stranding.  5. Hiatal hernia.                Impression     Final diagnoses:   Lower abdominal pain         ED Course & Medical Decision Making   Monik Santiago is a 78 year old female who presented to the emergency department with acute onset of back and lower abdominal pain that started at 3:00 this morning.  She initially felt as if she needed to have a bowel movement, and the pain quickly escalated.  She had several episodes of nausea and vomiting.  The pain was reported to be intense with level of 10/10.  Patient was seen shortly after arrival.  Temp is 98.2, blood pressure 149/66, heart rate of 63.  Laboratory workup reveals a normal CBC and competence of metabolic panel.  Nonfasting glucose is 133.  Patient received IV fluids, Dilaudid, Toradol, and Zofran.  Pain level was down to 6/10 and she received a second dose of Dilaudid.  CT imaging reveals a 2 mm left UVJ stone with mild left hydronephrosis and ureteral dilatation.  There is prominence of the right colonic wall presumably related to relative decompression but no pericolic inflammation.  She has findings of a hiatal hernia, atherosclerosis and a cholecystectomy.  Patient was informed that her lower abdominal and back pain is most likely from a ureteral stone.  This is close to the bladder and should pass spontaneously.  We will continue expectant management.  Push lots of water, take Flomax, and take Vicodin for breakthrough pain.  Add Zofran for nausea.  Recheck in the clinic in 2-3 days if not improving.  Return to the ED at any time if symptoms worsen.  Watch for fever or worsening pain and return immediately if this occurs.             Written after-visit summary and instructions were given at the time of discharge.    Follow up Plan:   No follow-up provider specified.    Discharge Medications: Vicodin, Zofran,  Flomax       This note was dictated using electronic voice recognition software and although reviewed, may contain grammatical and spelling errors.        Miki Khan MD  03/07/19 [id number]

## 2019-03-07 NOTE — ED NOTES
"Assisted to bathroom with melinda looney. Voided clear urine. Sample sent to lab. States \"that medication really helped\". Pain relief with toradol.  "

## 2019-04-27 DIAGNOSIS — I25.10 ASCVD (ARTERIOSCLEROTIC CARDIOVASCULAR DISEASE): ICD-10-CM

## 2019-04-27 DIAGNOSIS — E78.5 HYPERLIPIDEMIA LDL GOAL <70: ICD-10-CM

## 2019-04-30 RX ORDER — ATORVASTATIN CALCIUM 40 MG/1
TABLET, FILM COATED ORAL
Qty: 90 TABLET | Refills: 0 | Status: SHIPPED | OUTPATIENT
Start: 2019-04-30 | End: 2019-07-12

## 2019-04-30 NOTE — TELEPHONE ENCOUNTER
"Prescription approved per RN refill protocol.  Zohra Everett RN, BSN    Lipitor  Last Written Prescription Date:  1/28/2019  Last Fill Quantity: 90,  # refills: 0   Last office visit: 9/17/2018 with prescribing provider:  valarie   Future Office Visit:      Requested Prescriptions   Pending Prescriptions Disp Refills     atorvastatin (LIPITOR) 40 MG tablet [Pharmacy Med Name: ATORVASTATIN 40 MG TABLET] 90 tablet 0     Sig: TAKE 1 TABLET BY MOUTH EVERY DAY       Statins Protocol Passed - 4/27/2019  3:43 PM        Passed - LDL on file in past 12 months     Recent Labs   Lab Test 09/14/18  1139   LDL 41             Passed - No abnormal creatine kinase in past 12 months     Recent Labs   Lab Test 05/21/14  0605   CKT 44                Passed - Recent (12 mo) or future (30 days) visit within the authorizing provider's specialty     Patient had office visit in the last 12 months or has a visit in the next 30 days with authorizing provider or within the authorizing provider's specialty.  See \"Patient Info\" tab in inbasket, or \"Choose Columns\" in Meds & Orders section of the refill encounter.              Passed - Medication is active on med list        Passed - Patient is age 18 or older        Passed - No active pregnancy on record        Passed - No positive pregnancy test in past 12 months          Zohra Everett RN on 4/30/2019 at 8:38 AM    "

## 2019-05-06 DIAGNOSIS — M79.89 SWELLING OF LOWER LIMB: Primary | ICD-10-CM

## 2019-05-06 DIAGNOSIS — F43.23 ADJUSTMENT DISORDER WITH MIXED ANXIETY AND DEPRESSED MOOD: ICD-10-CM

## 2019-05-08 RX ORDER — DULOXETIN HYDROCHLORIDE 20 MG/1
CAPSULE, DELAYED RELEASE ORAL
Qty: 180 CAPSULE | Refills: 0 | Status: SHIPPED | OUTPATIENT
Start: 2019-05-08 | End: 2019-07-12

## 2019-05-08 RX ORDER — FUROSEMIDE 20 MG
TABLET ORAL
Qty: 90 TABLET | Refills: 2 | Status: SHIPPED | OUTPATIENT
Start: 2019-05-08 | End: 2020-02-20

## 2019-05-08 NOTE — TELEPHONE ENCOUNTER
"CYMBALTA- Dx for use. Anxiety with depressed mood.  Last Written Prescription Date:  1/29/19  Last Fill Quantity: 180,  # refills: 0   Last office visit: 9/17/2018 with prescribing provider:     Future Office Visit:  NONE  PHQ-9 SCORE 2/13/2017   PHQ-9 Total Score 13     Lasix  Last Written Prescription Date:  12/7/18  Last Fill Quantity: 90,  # refills: 1   Last office visit: 9/17/2018 with prescribing provider:     Future Office Visit:  NONE  Requested Prescriptions   Pending Prescriptions Disp Refills     DULoxetine (CYMBALTA) 20 MG capsule [Pharmacy Med Name: DULOXETINE HCL DR 20 MG CAP] 180 capsule 0     Sig: TAKE 1 CAPSULE BY MOUTH TWICE A DAY       Serotonin-Norepinephrine Reuptake Inhibitors  Passed - 5/6/2019  3:16 PM        Passed - Blood pressure under 140/90 in past 12 months     BP Readings from Last 3 Encounters:   03/07/19 126/67   11/29/18 136/70   10/22/18 138/76           Passed - Recent (12 mo) or future (30 days) visit within the authorizing provider's specialty     Patient had office visit in the last 12 months or has a visit in the next 30 days with authorizing provider or within the authorizing provider's specialty.  See \"Patient Info\" tab in inbasket, or \"Choose Columns\" in Meds & Orders section of the refill encounter.         Passed - Medication is active on med list        Passed - Patient is age 18 or older        Passed - No active pregnancy on record        Passed - No positive pregnancy test in past 12 months        furosemide (LASIX) 20 MG tablet [Pharmacy Med Name: FUROSEMIDE 20 MG TABLET] 90 tablet 1     Sig: TAKE 1 TABLET BY MOUTH EVERY DAY       Diuretics (Including Combos) Protocol Passed - 5/6/2019  3:16 PM        Passed - Blood pressure under 140/90 in past 12 months     BP Readings from Last 3 Encounters:   03/07/19 126/67   11/29/18 136/70   10/22/18 138/76           Passed - Recent (12 mo) or future (30 days) visit within the authorizing provider's specialty     Patient had " "office visit in the last 12 months or has a visit in the next 30 days with authorizing provider or within the authorizing provider's specialty.  See \"Patient Info\" tab in inbasket, or \"Choose Columns\" in Meds & Orders section of the refill encounter.          Passed - Medication is active on med list        Passed - Patient is age 18 or older        Passed - No active pregancy on record        Passed - Normal serum creatinine on file in past 12 months     Recent Labs   Lab Test 03/07/19  0753   CR 0.89              Passed - Normal serum potassium on file in past 12 months     Recent Labs   Lab Test 03/07/19  0753   POTASSIUM 4.4            Passed - Normal serum sodium on file in past 12 months     Recent Labs   Lab Test 03/07/19  0753               Passed - No positive pregnancy test in past 12 months        Routing Cymbalta refill request to provider for review/approval because:  PHQ9 score. 5  LillyRN              "

## 2019-07-09 ENCOUNTER — TELEPHONE (OUTPATIENT)
Dept: INTERNAL MEDICINE | Facility: CLINIC | Age: 79
End: 2019-07-09

## 2019-07-09 NOTE — TELEPHONE ENCOUNTER
Reason for call:  Patient reporting a symptom    Symptom or request: Patient has been having excessive sweating for no reason. Also, she has a lump on her neck under left side on jaw. It is becoming painful. She is wondering if it will be OK to be seen at next available or should she be seen sooner?     Duration (how long have symptoms been present): lump - 2 months, sweating - 2 months    Have you been treated for this before? No    Additional comments:     Phone Number patient can be reached at:  Home number on file [phone] (home)    Best Time:  any    Can we leave a detailed message on this number:  YES    Call taken on 7/9/2019 at 10:11 AM by Kristina Giron
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
## 2019-07-12 ENCOUNTER — OFFICE VISIT (OUTPATIENT)
Dept: INTERNAL MEDICINE | Facility: CLINIC | Age: 79
End: 2019-07-12
Payer: MEDICARE

## 2019-07-12 VITALS
OXYGEN SATURATION: 95 % | WEIGHT: 158 LBS | TEMPERATURE: 98.1 F | DIASTOLIC BLOOD PRESSURE: 66 MMHG | SYSTOLIC BLOOD PRESSURE: 138 MMHG | HEART RATE: 76 BPM | BODY MASS INDEX: 27.99 KG/M2 | RESPIRATION RATE: 16 BRPM

## 2019-07-12 DIAGNOSIS — R61 EXCESSIVE SWEATING: ICD-10-CM

## 2019-07-12 DIAGNOSIS — R07.0 THROAT PAIN: Primary | ICD-10-CM

## 2019-07-12 DIAGNOSIS — E78.5 HYPERLIPIDEMIA LDL GOAL <70: ICD-10-CM

## 2019-07-12 DIAGNOSIS — F43.23 ADJUSTMENT DISORDER WITH MIXED ANXIETY AND DEPRESSED MOOD: ICD-10-CM

## 2019-07-12 DIAGNOSIS — I25.10 ASCVD (ARTERIOSCLEROTIC CARDIOVASCULAR DISEASE): ICD-10-CM

## 2019-07-12 DIAGNOSIS — I10 ESSENTIAL HYPERTENSION WITH GOAL BLOOD PRESSURE LESS THAN 140/90: ICD-10-CM

## 2019-07-12 PROCEDURE — 99214 OFFICE O/P EST MOD 30 MIN: CPT | Performed by: INTERNAL MEDICINE

## 2019-07-12 RX ORDER — METOPROLOL TARTRATE 25 MG/1
50 TABLET, FILM COATED ORAL 2 TIMES DAILY
Qty: 260 TABLET | Refills: 2 | Status: SHIPPED | OUTPATIENT
Start: 2019-07-12 | End: 2019-12-18

## 2019-07-12 RX ORDER — ATORVASTATIN CALCIUM 40 MG/1
40 TABLET, FILM COATED ORAL DAILY
Qty: 90 TABLET | Refills: 3 | Status: SHIPPED | OUTPATIENT
Start: 2019-07-12 | End: 2020-07-20

## 2019-07-12 RX ORDER — DULOXETIN HYDROCHLORIDE 20 MG/1
20 CAPSULE, DELAYED RELEASE ORAL DAILY
Qty: 90 CAPSULE | Refills: 3 | Status: SHIPPED | OUTPATIENT
Start: 2019-07-12 | End: 2019-12-02

## 2019-07-12 ASSESSMENT — PAIN SCALES - GENERAL: PAINLEVEL: SEVERE PAIN (6)

## 2019-07-12 ASSESSMENT — PATIENT HEALTH QUESTIONNAIRE - PHQ9: SUM OF ALL RESPONSES TO PHQ QUESTIONS 1-9: 1

## 2019-07-12 NOTE — PROGRESS NOTES
Subjective     Monik Santiago is a 79 year old female who presents to clinic today for the following health issues:    HPI   Chief Complaint   Patient presents with     Pain     left side of neck is sore, started in May     Sweats     Left side neck in the front and some in the back.  Has had some pain there before in 2015, ultrasound showed a lymph node.  Sometimes food doesn't go down or gets stuck, liquid can get stuck as well.      Sweats all the time, on cymbalta.    Past Medical History:   Diagnosis Date     Clostridium difficile diarrhea      Coronary artery disease      Generalized anxiety disorder      GERD (gastroesophageal reflux disease)      History of MRI of brain and brain stem 4/10/2015    MR BRAIN FOR STROKE COMPLETE W/O & W CONTRAST 4/9/2015 9:34 PM MRI of the brain without and with contrast MRA of the head without contrast MRA of the neck without and with contrast History: deniz for PRESS, Comparison: 3/19/2015,  Technique:  Brain: Axial diffusion-weighted with ADC map, T2-weighted with fat saturation, T1-weighted and turboFLAIR and coronal T1-weighted images of the brain were obt     Myocardial infarction (H)     NSTEMI     Other alteration of consciousness 7/2007    see neuro consult 7/25/2007. MinCep THOMSON was neg. Does not have seizures.      Other and unspecified hyperlipidemia      Syncope 10/19/2009    related to BP medications     Unspecified essential hypertension      Current Outpatient Medications   Medication     ACETAMINOPHEN PO     ASPIRIN PO     atorvastatin (LIPITOR) 40 MG tablet     DULoxetine (CYMBALTA) 20 MG capsule     estradiol (ESTRACE VAGINAL) 0.1 MG/GM cream     furosemide (LASIX) 20 MG tablet     lisinopril (PRINIVIL/ZESTRIL) 20 MG tablet     loperamide (IMODIUM) 2 MG capsule     Lysine 500 MG TABS     metoprolol tartrate (LOPRESSOR) 25 MG tablet     Multiple Vitamins-Minerals (MULTIVITAMIN ADULT) CHEW     halobetasol (ULTRAVATE) 0.05 % ointment     nitroGLYcerin (NITROSTAT)  0.4 MG sublingual tablet     Probiotic Product (PROBIOTIC DAILY PO)     No current facility-administered medications for this visit.      Review of Systems  Constitutional-No fevers, chills, or weight changes..  ENT-left throat pain.  Cardiac-No chest pain or palpitations.  Respiratory-No cough, sob, or hemoptysis.  GI-No nausea, vomitting, diarrhea, constipation, or blood in the stool.  Psych-No depression.    Physical Exam  /66   Pulse 76   Temp 98.1  F (36.7  C) (Temporal)   Resp 16   Wt 71.7 kg (158 lb)   SpO2 95%   BMI 27.99 kg/m     General Appearance-healthy, alert, no distress  ENT- throat is clear, no tooth pain, left submandibular area is sore to touch.  Extremities-1+ pitting edema     ASSESSMENT:  79-year-old woman who is here with her daughter, she has left submandibular pain to palpation, some difficulties with swallowing her food even liquid getting caught there.  She has had this in 2015 we did an ultrasound showing a lymph node.  Patient is quite tender there today therefore we will refer her to ENT for an internal evaluation.  I do not feel lumps I want to do any other imaging at this time.    We discussed her peripheral edema which could be from amlodipine which is worse in the summer, will stop the amlodipine and increase her metoprolol to 50 mg twice a day to help her blood pressure as well.    Excessive sweating patient is otherwise feeling well I think this is probably from Cymbalta, she can cut the Cymbalta from 20 mg twice a day to 20 mg once a day see how she does.  Back in 2016 when she was placed hospitalized in the geriatric psychiatric unit they weaned her off Celexa and went to Cymbalta.  We may need to change her to a different medication due to the Cymbalta side effects.

## 2019-07-13 ENCOUNTER — HOSPITAL ENCOUNTER (EMERGENCY)
Facility: CLINIC | Age: 79
Discharge: HOME | End: 2019-07-13
Attending: EMERGENCY MEDICINE | Admitting: EMERGENCY MEDICINE
Payer: MEDICARE

## 2019-07-13 ENCOUNTER — APPOINTMENT (OUTPATIENT)
Dept: ULTRASOUND IMAGING | Facility: CLINIC | Age: 79
End: 2019-07-13
Attending: PHYSICIAN ASSISTANT
Payer: MEDICARE

## 2019-07-13 ENCOUNTER — APPOINTMENT (OUTPATIENT)
Dept: GENERAL RADIOLOGY | Facility: CLINIC | Age: 79
End: 2019-07-13
Attending: PHYSICIAN ASSISTANT
Payer: MEDICARE

## 2019-07-13 VITALS
TEMPERATURE: 98.1 F | SYSTOLIC BLOOD PRESSURE: 144 MMHG | BODY MASS INDEX: 25.69 KG/M2 | OXYGEN SATURATION: 93 % | WEIGHT: 145 LBS | DIASTOLIC BLOOD PRESSURE: 74 MMHG | RESPIRATION RATE: 20 BRPM | HEART RATE: 58 BPM

## 2019-07-13 DIAGNOSIS — S20.219A RIB CONTUSION: ICD-10-CM
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
## 2019-07-13 DIAGNOSIS — M62.838 NECK MUSCLE SPASM: ICD-10-CM

## 2019-07-13 DIAGNOSIS — R53.83 FATIGUE: ICD-10-CM

## 2019-07-13 LAB
ALBUMIN SERPL-MCNC: 3.8 G/DL (ref 3.4–5)
ALP SERPL-CCNC: 97 U/L (ref 40–150)
ALT SERPL W P-5'-P-CCNC: 11 U/L (ref 0–50)
ANION GAP SERPL CALCULATED.3IONS-SCNC: 8 MMOL/L (ref 3–14)
AST SERPL W P-5'-P-CCNC: 18 U/L (ref 0–45)
BASOPHILS # BLD AUTO: 0.1 10E9/L (ref 0–0.2)
BASOPHILS NFR BLD AUTO: 1.2 %
BILIRUB SERPL-MCNC: 0.5 MG/DL (ref 0.2–1.3)
BUN SERPL-MCNC: 24 MG/DL (ref 7–30)
CALCIUM SERPL-MCNC: 9.2 MG/DL (ref 8.5–10.1)
CHLORIDE SERPL-SCNC: 108 MMOL/L (ref 94–109)
CO2 SERPL-SCNC: 29 MMOL/L (ref 20–32)
CREAT SERPL-MCNC: 0.89 MG/DL (ref 0.52–1.04)
D DIMER PPP FEU-MCNC: 0.6 UG/ML FEU (ref 0–0.5)
DIFFERENTIAL METHOD BLD: NORMAL
EOSINOPHIL NFR BLD AUTO: 3.6 %
ERYTHROCYTE [DISTWIDTH] IN BLOOD BY AUTOMATED COUNT: 14.3 % (ref 10–15)
GFR SERPL CREATININE-BSD FRML MDRD: 62 ML/MIN/{1.73_M2}
GLUCOSE SERPL-MCNC: 90 MG/DL (ref 70–99)
HCT VFR BLD AUTO: 37.6 % (ref 35–47)
HGB BLD-MCNC: 12.3 G/DL (ref 11.7–15.7)
IMM GRANULOCYTES # BLD: 0 10E9/L (ref 0–0.4)
IMM GRANULOCYTES NFR BLD: 0.2 %
LIPASE SERPL-CCNC: 52 U/L (ref 73–393)
LYMPHOCYTES # BLD AUTO: 2.3 10E9/L (ref 0.8–5.3)
LYMPHOCYTES NFR BLD AUTO: 41.5 %
MCH RBC QN AUTO: 30.4 PG (ref 26.5–33)
MCHC RBC AUTO-ENTMCNC: 32.7 G/DL (ref 31.5–36.5)
MCV RBC AUTO: 93 FL (ref 78–100)
MONOCYTES # BLD AUTO: 0.5 10E9/L (ref 0–1.3)
MONOCYTES NFR BLD AUTO: 8.7 %
NEUTROPHILS # BLD AUTO: 2.5 10E9/L (ref 1.6–8.3)
NEUTROPHILS NFR BLD AUTO: 44.8 %
NRBC # BLD AUTO: 0 10*3/UL
NRBC BLD AUTO-RTO: 0 /100
NT-PROBNP SERPL-MCNC: 340 PG/ML (ref 0–1800)
PLATELET # BLD AUTO: 235 10E9/L (ref 150–450)
POTASSIUM SERPL-SCNC: 3.7 MMOL/L (ref 3.4–5.3)
PROT SERPL-MCNC: 6.8 G/DL (ref 6.8–8.8)
RBC # BLD AUTO: 4.05 10E12/L (ref 3.8–5.2)
SODIUM SERPL-SCNC: 145 MMOL/L (ref 133–144)
TROPONIN I SERPL-MCNC: <0.015 UG/L (ref 0–0.04)
TSH SERPL DL<=0.005 MIU/L-ACNC: 1.53 MU/L (ref 0.4–4)
WBC # BLD AUTO: 5.6 10E9/L (ref 4–11)

## 2019-07-13 PROCEDURE — 83690 ASSAY OF LIPASE: CPT | Performed by: PHYSICIAN ASSISTANT

## 2019-07-13 PROCEDURE — 85025 COMPLETE CBC W/AUTO DIFF WBC: CPT | Performed by: EMERGENCY MEDICINE

## 2019-07-13 PROCEDURE — 99285 EMERGENCY DEPT VISIT HI MDM: CPT | Mod: 25 | Performed by: PHYSICIAN ASSISTANT

## 2019-07-13 PROCEDURE — 80053 COMPREHEN METABOLIC PANEL: CPT | Performed by: PHYSICIAN ASSISTANT

## 2019-07-13 PROCEDURE — 93005 ELECTROCARDIOGRAM TRACING: CPT | Performed by: PHYSICIAN ASSISTANT

## 2019-07-13 PROCEDURE — 85379 FIBRIN DEGRADATION QUANT: CPT | Performed by: PHYSICIAN ASSISTANT

## 2019-07-13 PROCEDURE — 36415 COLL VENOUS BLD VENIPUNCTURE: CPT | Performed by: PHYSICIAN ASSISTANT

## 2019-07-13 PROCEDURE — 20552 NJX 1/MLT TRIGGER POINT 1/2: CPT | Mod: Z6 | Performed by: PHYSICIAN ASSISTANT

## 2019-07-13 PROCEDURE — 93010 ELECTROCARDIOGRAM REPORT: CPT | Mod: 59 | Performed by: PHYSICIAN ASSISTANT

## 2019-07-13 PROCEDURE — 84443 ASSAY THYROID STIM HORMONE: CPT | Performed by: PHYSICIAN ASSISTANT

## 2019-07-13 PROCEDURE — 71101 X-RAY EXAM UNILAT RIBS/CHEST: CPT | Mod: TC,RT

## 2019-07-13 PROCEDURE — 20552 NJX 1/MLT TRIGGER POINT 1/2: CPT | Performed by: PHYSICIAN ASSISTANT

## 2019-07-13 PROCEDURE — 83880 ASSAY OF NATRIURETIC PEPTIDE: CPT | Performed by: PHYSICIAN ASSISTANT

## 2019-07-13 PROCEDURE — 84484 ASSAY OF TROPONIN QUANT: CPT | Performed by: PHYSICIAN ASSISTANT

## 2019-07-13 PROCEDURE — 93970 EXTREMITY STUDY: CPT

## 2019-07-13 PROCEDURE — 25000128 H RX IP 250 OP 636: Performed by: PHYSICIAN ASSISTANT

## 2019-07-13 PROCEDURE — 20553 NJX 1/MLT TRIGGER POINTS 3/>: CPT | Performed by: PHYSICIAN ASSISTANT

## 2019-07-13 PROCEDURE — 25000132 ZZH RX MED GY IP 250 OP 250 PS 637: Mod: GY | Performed by: PHYSICIAN ASSISTANT

## 2019-07-13 RX ORDER — ASPIRIN 81 MG/1
324 TABLET, CHEWABLE ORAL ONCE
Status: COMPLETED | OUTPATIENT
Start: 2019-07-13 | End: 2019-07-13

## 2019-07-13 RX ORDER — ALUMINA, MAGNESIA, AND SIMETHICONE 2400; 2400; 240 MG/30ML; MG/30ML; MG/30ML
15 SUSPENSION ORAL ONCE
Status: COMPLETED | OUTPATIENT
Start: 2019-07-13 | End: 2019-07-13

## 2019-07-13 RX ORDER — BUPIVACAINE HYDROCHLORIDE 2.5 MG/ML
10 INJECTION, SOLUTION EPIDURAL; INFILTRATION; INTRACAUDAL ONCE
Status: COMPLETED | OUTPATIENT
Start: 2019-07-13 | End: 2019-07-13

## 2019-07-13 RX ADMIN — ASPIRIN 81 MG 324 MG: 81 TABLET ORAL at 18:26

## 2019-07-13 RX ADMIN — BUPIVACAINE HYDROCHLORIDE 25 MG: 2.5 INJECTION, SOLUTION EPIDURAL; INFILTRATION; INTRACAUDAL at 20:14

## 2019-07-13 RX ADMIN — ALUMINUM HYDROXIDE, MAGNESIUM HYDROXIDE, AND DIMETHICONE 15 ML: 400; 400; 40 SUSPENSION ORAL at 18:28

## 2019-07-13 NOTE — ED PROVIDER NOTES
History     Chief Complaint   Patient presents with     Chest Pain     The history is provided by the patient and a relative.     Monik Santiago is a 79 year old female who presents in the emergency department with her daughter for jaw and arm pain. Patient states that she fell yesterday. She was feeling weak, dizzy, and faint and then tripped in the doorway. She fell on her right side on the entry rug. She said that she hit her ribs and they are now sore. The patient denies any loss of consciousness or hitting her head and remembers the accident clearly. The patient felt weak and was unable to get up. The patient states that she has had persistent left sided jaw pain for a while. The patient states that she has also been experiencing intermittent left arm and shoulder pain and numbness for a while. Patient denies any current pain in her left arm but says it is uncomfortable. The patient states that she has felt short of breath intermittently recently. Patient states she periodically feels weak, dizzy, and faint.The patient's daughter noted that she has been sweating more than usual for the last couple of weeks. The sweating seems to accompany the shortness of breath, jaw, arm pain, and weakness. Patient has noticed an increase in edema in her lower extremities, especially in the left lower extremity, over the last couple of weeks. Patient denies any chest pain or abdominal pain. The patient also has had dysphagia and states that her throat seems like it is being squeezed. She states that if feels as if there is something blocking her throat. Patient had an angioplasty in 2012 and again in 2014.  Earlier today the patient had taken her daily 85 mg Asprin and she had nitroglycerin between 0151-0721 tonight that offered some relief to her pain.        Allergies:  Allergies   Allergen Reactions     Codeine Fatigue     Latex      Lidocaine Other (See Comments)     was one of 3 drugs given during GA that caused  difficulty with anesthesia reversal     Sulfa Drugs Hives     Trimethoprim Hives     Valium Fatigue       Problem List:    Patient Active Problem List    Diagnosis Date Noted     Chest pain, atypical 11/19/2012     Priority: High     ASCVD (arteriosclerotic cardiovascular disease) 11/19/2012     Priority: High     Chest pain 08/17/2017     Priority: Medium     RUQ abdominal pain 12/03/2016     Priority: Medium     S/P laparoscopic cholecystectomy 12/03/2016     Priority: Medium     History of MRI of brain and brain stem 04/10/2015     Priority: Medium     MR BRAIN FOR STROKE COMPLETE W/O & W CONTRAST 4/9/2015 9:34 PM  MRI of the brain without and with contrast  MRA of the head without contrast  MRA of the neck without and with contrast  History: eval for PRESS,  Comparison: 3/19/2015,   Technique:   Brain: Axial diffusion-weighted with ADC map, T2-weighted with fat  saturation, T1-weighted and turboFLAIR and coronal T1-weighted images  of the brain were obtained without intravenous contrast. Following  intravenous administration of gadolinium, axial turboFLAIR and coronal  T1-weighted images of the brain were obtained.   MRA: 3D time-of-flight MR angiography of the major arteries at the  base of the brain was performed without contrast. 2D time-of-flight  MRA without contrast with superior and inferior saturation bands and  3D T1-weighted postgadolinium MRA of the neck/cervical vessels were  also obtained. 3-dimensional reconstructions were created of the MRA  of both the head and the neck, which were reviewed by the radiologist.  Contrast: 10mL Gadavist  Findings: No areas of restricted diffusion. Marked volume loss  particularly affecting frontal, parietal and medial temporal lobe.s  Moderate confluent periventricular deep white white matter FLAIR  hyperintensities similar in appearance to previous exam. Some apparent  faint postcontrast T1 hyperintensity in the left cerebellar hemisphere  adjacent to the fourth  ventricle is favored to be related to artifact.  No abnormal contrast enhancement is noted. Acute intracranial  hemorrhage or extra-axial collection. There is no hydrocephalus.  MR angiogram of the head: Posterior circulation appears patent.  Evaluation of anterior circulation is markedly limited due to motion  artifact. Bilateral intracranial internal carotid arteries are grossly  patent.  MR angiography of the neck demonstrates patent origins of the carotid  and vertebral arteries. There are codominant vertebral arteries which  are patent throughout the neck. Bilateral common carotid arteries,  carotid bifurcations and internal carotid arteries are patent.  IMPRESSION  Impression:   No acute infarction acute intracranial hemorrhage or extra-axial  collection. No hydrocephalus.  Marked parenchymal volume loss particularly affecting the frontal  convexity, parietal lobes and bilateral medial temporal lobes.  Confluent white matter T2 hyperintensities in the periventricular deep  white matter are most compatible with moderate chronic microvascular  ischemic changes.  MR angiogram of the head is partially limited; particularly the  anterior circulation evaluation is limited.   Neck MR angiogram demonstrates no hemodynamically significant  stenosis.  I have personally reviewed the examination and initial interpretation  and I agree with the findings.  RAVINDER REYNOSO MD       Altered mental status 04/08/2015     Priority: Medium     Headache 04/08/2015     Priority: Medium     Problem list name updated by automated process. Provider to review       History of C.diff colitis 04/08/2015     Priority: Medium     Anxiety 04/08/2015     Priority: Medium     Lightheaded 03/07/2015     Priority: Medium     Lightheadedness 03/06/2015     Priority: Medium     Nausea without vomiting 02/06/2015     Priority: Medium     Problem list name updated by automated process. Provider to review       Recent repair of hiatal hernia 1/30/15  02/06/2015     Priority: Medium     Urine retention - singh placed 2/4/15 02/06/2015     Priority: Medium     Bilateral leg edema 02/06/2015     Priority: Medium     Old myocardial infarction 2012 02/06/2015     Priority: Medium     Pseudoseizure 01/30/2015     Priority: Medium     Acidosis-metabolic 01/30/2015     Priority: Medium     Syncope 06/16/2014     Priority: Medium     Health Care Home 05/27/2014     Priority: Medium     State Tier Level:  Tier 3  Status:  Closed  Care Coordinator:  Michelle Gaytan RN, BSN    See Letters for [facility] Care Plan             Abdominal pain, unspecified abdominal location 05/21/2014     Priority: Medium     Problem list name updated by automated process. Provider to review       Near syncope 05/20/2014     Priority: Medium     Coronary atherosclerosis of native coronary artery (VESSEL) 05/20/2014     Priority: Medium     Lichen sclerosus et atrophicus of the vulva 10/08/2013     Priority: Medium     CKD (chronic kidney disease) stage 3, GFR 30-59 ml/min (H) 11/19/2012     Priority: Medium     Acute worsening of stage 3 chronic kidney disease (H) 11/19/2012     Priority: Medium     Diagnosis updated by automated process. Provider to review and confirm.       NSTEMI (non-ST elevated myocardial infarction), history 10/06/2012     Priority: Medium     Acute myocardial infarction (H) 10/05/2012     Priority: Medium     Hypoxia 10/05/2012     Priority: Medium     Hypertension goal BP (blood pressure) < 140/90 05/02/2011     Priority: Medium     Osteoarthritis 09/13/2010     Priority: Medium     GERD (gastroesophageal reflux disease)      Priority: Medium     Other alteration of consciousness 07/01/2007     Priority: Medium     see neuro consult 7/25/2007       Systolic CHF, chronic (H) 11/20/2012     Priority: Low     Anemia 11/20/2012     Priority: Low     DVT prophylaxis 11/20/2012     Priority: Low     Advanced directives, counseling/discussion 05/16/2012     Priority: Low     Advance  Directive received and scanned. Click on Code in the patient header to view. 5/16/2012          Hyperlipidemia LDL goal <70 10/31/2010     Priority: Low     Generalized anxiety disorder      Priority: Low        Past Medical History:    Past Medical History:   Diagnosis Date     Clostridium difficile diarrhea      Coronary artery disease      Generalized anxiety disorder      GERD (gastroesophageal reflux disease)      History of MRI of brain and brain stem 4/10/2015     Myocardial infarction (H)      Other alteration of consciousness 7/2007     Other and unspecified hyperlipidemia      Syncope 10/19/2009     Unspecified essential hypertension        Past Surgical History:    Past Surgical History:   Procedure Laterality Date     C NONSPECIFIC PROCEDURE      abd hernia repair     C TOTAL KNEE ARTHROPLASTY  08/23/10    Right knee     Cardiac stents       COLONOSCOPY N/A 2/17/2016    Procedure: COMBINED COLONOSCOPY, SINGLE OR MULTIPLE BIOPSY/POLYPECTOMY BY BIOPSY;  Surgeon: Jose Gan MD;  Location: [facility]     ESOPHAGOSCOPY, GASTROSCOPY, DUODENOSCOPY (EGD), COMBINED N/A 12/17/2014    Procedure: COMBINED ESOPHAGOSCOPY, GASTROSCOPY, DUODENOSCOPY (EGD);  Surgeon: Kaz Mccoy MD;  Location: [facility]     HC REMV CATARACT EXTRACAP,INSERT LENS,COMP      darrian     HC UGI ENDOSCOPY DIAG W BIOPSY  12/16/09     HC YAG LASER CAPSULOTOMY  9/17/2009    Right eye     HEART CATH, ANGIOPLASTY  10/03/2012    Stent of LAD     HEART CATH, ANGIOPLASTY  05/17/2014     CAD, patent stent of LAD     LAPAROSCOPIC CHOLECYSTECTOMY N/A 12/3/2016    Procedure: LAPAROSCOPIC CHOLECYSTECTOMY;  Surgeon: Viral Meyers MD;  Location: [facility]     LAPAROSCOPIC HERNIORRHAPHY HIATAL N/A 1/30/2015    Procedure: LAPAROSCOPIC HERNIORRHAPHY HIATAL;  Surgeon: Chase Camara MD;  Location: [facility]     LAPAROSCOPIC NISSEN FUNDOPLICATION N/A 1/30/2015    Procedure: LAPAROSCOPIC NISSEN FUNDOPLICATION;  Surgeon: Chase Camara MD;   Location: [facility]     MOHS MICROGRAPHIC PROCEDURE  09/14/11    left upper forehead-09/14/11       Family History:    Family History   Problem Relation Age of Onset     Cardiovascular Father      Cardiovascular Brother      Cardiovascular Mother      Hypertension Mother      Lipids Mother      Cardiovascular Sister         stents x 2-3     Hypertension Sister      Cardiovascular Sister         MI 64     Diabetes No family hx of        Social History:  Marital Status:   [5]  Social History     Tobacco Use     Smoking status: Never Smoker     Smokeless tobacco: Never Used   Substance Use Topics     Alcohol use: No     Alcohol/week: 0.0 oz     Drug use: No        Medications:      ACETAMINOPHEN PO   ASPIRIN PO   atorvastatin (LIPITOR) 40 MG tablet   DULoxetine (CYMBALTA) 20 MG capsule   estradiol (ESTRACE VAGINAL) 0.1 MG/GM cream   furosemide (LASIX) 20 MG tablet   halobetasol (ULTRAVATE) 0.05 % ointment   lisinopril (PRINIVIL/ZESTRIL) 20 MG tablet   loperamide (IMODIUM) 2 MG capsule   Lysine 500 MG TABS   metoprolol tartrate (LOPRESSOR) 25 MG tablet   Multiple Vitamins-Minerals (MULTIVITAMIN ADULT) CHEW   nitroGLYcerin (NITROSTAT) 0.4 MG sublingual tablet   Probiotic Product (PROBIOTIC DAILY PO)         Review of Systems   All other systems reviewed and are negative.      Physical Exam   BP: 147/70  Pulse: 70  Heart Rate: 73  Temp: 98.1  F (36.7  C)  Resp: 14  Weight: 65.8 kg (145 lb)  SpO2: 95 %      Physical Exam   Constitutional: She is oriented to person, place, and time. No distress.   HENT:   Head: Normocephalic and atraumatic.   Right Ear: External ear normal.   Left Ear: External ear normal.   Nose: Nose normal.   Mouth/Throat: Oropharynx is clear and moist. No oropharyngeal exudate.   Eyes: Pupils are equal, round, and reactive to light. Conjunctivae and EOM are normal. Right eye exhibits no discharge. Left eye exhibits no discharge. No scleral icterus.   Neck: Normal range of motion. Neck supple. No  thyromegaly present.   Cardiovascular: Normal rate, regular rhythm and normal heart sounds.   No murmur heard.  Pulmonary/Chest: Effort normal and breath sounds normal. No respiratory distress. She has no wheezes. She has no rhonchi. She has no rales. She exhibits no tenderness.   Right sided chest wall discomfort at the level of ribs 3 and 4 and then also at ribs 8 and 9 along the anterior axillary line.  No crepitus or step-off.  No rash.  No bruising.   Abdominal: Soft. Bowel sounds are normal. She exhibits no distension and no mass. There is no tenderness. There is no rebound and no guarding.   Musculoskeletal: Normal range of motion. She exhibits no tenderness or deformity.        Right lower leg: She exhibits edema (1+ bilateral lower extremity edema with some tenderness on palpation of the left calf but no palpable cord.  Negative Homans sign.  No bruising.  No deformity.).        Left lower leg: She exhibits edema.   Significant muscular tenderness of the left paravertebral cervical muscles with trigger points noted.  This re-creates symptoms going down her arm.  She also has some anterior/superior sternocleidomastoid discomfort.  She points to this area when she swallows saying that this is the pain that she experiences.  She does not have any difficulty swallowing upon my observation.   Lymphadenopathy:     She has no cervical adenopathy.   Neurological: She is alert and oriented to person, place, and time. No cranial nerve deficit.   Skin: Skin is warm and dry. Capillary refill takes less than 2 seconds. No rash noted. She is not diaphoretic. No erythema.   Psychiatric: She has a normal mood and affect. Her behavior is normal. Thought content normal.   Nursing note and vitals reviewed.      ED Course        Procedures  We discussed trigger point injections.  She would like to proceed.  For trigger points were identified in the left paracervical musculature at the base of the occiput and also one down to  C7.  2.5 mL of 0.25% bupivacaine without epinephrine were injected into 4 different trigger points after alcohol swabs applied for infection prophylaxis.  She tolerated this well.  No complications identified.    25 minutes after trigger point injections, I reevaluated the patient.  She states that her neck, jaw, and arm pain had completely resolved.  She is feeling much improved.  She is virtually asymptomatic by the end of her visit.                  EKG Interpretation:      Interpreted by Chris Lutz  Time reviewed: 1750  Symptoms at time of EKG: right chest discomfort.   Rhythm: normal sinus   Rate: normal  Axis: normal  Ectopy: none  Conduction: normal  ST Segments/ T Waves: No ST-T wave changes  Q Waves: none  Comparison to prior: Unchanged    Clinical Impression: normal EKG          Critical Care time:  none               Results for orders placed or performed during the hospital encounter of 07/13/19 (from the past 24 hour(s))   CBC with platelets differential   Result Value Ref Range    WBC 5.6 4.0 - 11.0 10e9/L    RBC Count 4.05 3.8 - 5.2 10e12/L    Hemoglobin 12.3 11.7 - 15.7 g/dL    Hematocrit 37.6 35.0 - 47.0 %    MCV 93 78 - 100 fl    MCH 30.4 26.5 - 33.0 pg    MCHC 32.7 31.5 - 36.5 g/dL    RDW 14.3 10.0 - 15.0 %    Platelet Count 235 150 - 450 10e9/L    Diff Method Automated Method     % Neutrophils 44.8 %    % Lymphocytes 41.5 %    % Monocytes 8.7 %    % Eosinophils 3.6 %    % Basophils 1.2 %    % Immature Granulocytes 0.2 %    Nucleated RBCs 0 0 /100    Absolute Neutrophil 2.5 1.6 - 8.3 10e9/L    Absolute Lymphocytes 2.3 0.8 - 5.3 10e9/L    Absolute Monocytes 0.5 0.0 - 1.3 10e9/L    Absolute Basophils 0.1 0.0 - 0.2 10e9/L    Abs Immature Granulocytes 0.0 0 - 0.4 10e9/L    Absolute Nucleated RBC 0.0    Comprehensive metabolic panel   Result Value Ref Range    Sodium 145 (H) 133 - 144 mmol/L    Potassium 3.7 3.4 - 5.3 mmol/L    Chloride 108 94 - 109 mmol/L    Carbon Dioxide 29 20 - 32 mmol/L     Anion Gap 8 3 - 14 mmol/L    Glucose 90 70 - 99 mg/dL    Urea Nitrogen 24 7 - 30 mg/dL    Creatinine 0.89 0.52 - 1.04 mg/dL    GFR Estimate 62 >60 mL/min/[1.73_m2]    GFR Estimate If Black 72 >60 mL/min/[1.73_m2]    Calcium 9.2 8.5 - 10.1 mg/dL    Bilirubin Total 0.5 0.2 - 1.3 mg/dL    Albumin 3.8 3.4 - 5.0 g/dL    Protein Total 6.8 6.8 - 8.8 g/dL    Alkaline Phosphatase 97 40 - 150 U/L    ALT 11 0 - 50 U/L    AST 18 0 - 45 U/L   D dimer quantitative   Result Value Ref Range    D Dimer 0.6 (H) 0.0 - 0.50 ug/ml FEU   Lipase   Result Value Ref Range    Lipase 52 (L) 73 - 393 U/L   Nt probnp inpatient   Result Value Ref Range    N-Terminal Pro BNP Inpatient 340 0 - 1,800 pg/mL   Troponin I   Result Value Ref Range    Troponin I ES <0.015 0.000 - 0.045 ug/L   TSH with free T4 reflex   Result Value Ref Range    TSH 1.53 0.40 - 4.00 mU/L   Ribs XR, unilat 3 views + PA chest, right    Narrative    RIBS UNILATERAL THREE VIEWS RIGHT  7/13/2019 6:45 PM    HISTORY: Fall, right anterior axillary line rib discomfort.    COMPARISON: August 17, 2017    FINDINGS: Oblique views of the right hemithorax demonstrate no rib  fractures or pneumothorax. There are no acute infiltrates. The cardiac  silhouette is not enlarged. Pulmonary vasculature is unremarkable.      Impression    IMPRESSION: No rib fracture demonstrated.    CORONA CHAVEZ MD   US Lower Extremity Venous Duplex Bilateral    Narrative    ULTRASOUND VENOUS LOWER EXTREMITY BILATERAL 7/13/2019 7:29 PM     HISTORY: Bilateral LE edema.    COMPARISON: February 6, 2015    TECHNIQUE: Ultrasound gray scale, Color Doppler flow, and spectral  Doppler waveform analysis performed.    FINDINGS: The bilateral common femoral, superficial femoral, popliteal  and posterior tibial veins are patent and fully compressible and  demonstrate normal venous Doppler flow. The visualized greater  saphenous veins are negative for thrombus.      Impression    IMPRESSION: No DVT demonstrated.    CORONA  MD KATHY       Medications   aspirin (ASA) chewable tablet 324 mg (324 mg Oral Given 7/13/19 1826)   alum & mag hydroxide-simethicone (MYLANTA ES/MAALOX  ES) suspension 15 mL (15 mLs Oral Given 7/13/19 1828)   bupivacaine (MARCAINE) preservative free injection 0.25% (25 mg Intradermal Given by Other 7/13/19 2014)       Assessments & Plan (with Medical Decision Making)     Fall  Rib contusion  Neck muscle spasm  Fatigue     79 year old female with a history of CAD who presents for evaluation of right sided chest discomfort after a fall today.  She tripped on a rug and fell onto her chest wall.  She has had right-sided chest discomfort ever since then.  She states that she has had ongoing left-sided jaw pain, pain with swallowing in the anterolateral neck, left upper extremity pain/numbness/tingling, and episodes of diaphoresis for the past 2 weeks.  She is concerned that she could have a cardiac abnormality given that these were similar symptoms upon noticing that she had an NSTEMI in the past.  She has not had any chest discomfort until she fell.  She describes dyspnea off and on.  Having troubles with weakness and fatigue as well.  Has been following with her PCP regarding this with a recent appointment yesterday.  Thought to potentially be related to medication side effects.  Patient denies any injury to her neck area.  She has had neck and left upper extremity discomfort for the past 2 weeks.  On exam blood pressure 144/74, pulse 60, temperature 98.1, and oxygen saturation 93% on room air.  She is in no acute distress upon evaluation.  She is tender to palpation over the right anterolateral chest wall anterior axillary line with no crepitus, step-off, swelling, or bruising.  Lung fields are clear.  Heart normal.  Trigger points and muscle spasms noted in the left paravertebral musculature of the cervical spine reproducing symptoms into her left upper extremity.  Bilateral upper and lower extremities without  evidence for trauma.  No deformity.  No bruising.  She does have chronic 1+ lower extremity edema with some calf discomfort on the left but no palpable cord and negative Homans sign.  Remainder of her exam normal.  She was given a dose of aspirin upon evaluation.  EKG performed right away without acute ST or T wave change.  Reviewed by myself.  No sign of cardiac ischemia.  Liquid Maalox given, but this did not provide any improvement in her symptoms.  Laboratory levels returned with an undetectable troponin.  Very reassuring given that she has had left upper extremity discomfort, jaw discomfort, and diaphoresis for almost 2 weeks.  I would expect that her troponin would be elevated by now if she did have underlying ischemic disease.  D-dimer was 0.6 which is normal given her age corrected status.  BNP normal at 340 which is a very good level for someone with a past medical history of CHF.  Lipase normal.  Comprehensive metabolic panel without significant abnormality.  CBC normal.  Rib details with chest x-ray displaying no evidence for acute rib fracture.  No infiltrate or pneumothorax.  Lower extremity ultrasound without DVT or other abnormality.  Given her reassuring studies at that time, we did discuss a trial of trigger point injections given her abnormal exam findings with paracervical muscular discomfort reproducing symptoms into the left upper extremity.  She wanted to proceed.  For trigger point injections were provided with 0.25% bupivacaine without epinephrine.  30 minutes after her injections, she had complete resolution of her neck discomfort, stiffness, jaw discomfort, and left upper extremity pain/numbness/tingling.  She was feeling much improved by the end of her ED stay.  We discussed warm compresses to be applied to the neck musculature for 20 minutes every couple hours.  Stretching exercises recommended.  Return instructions discussed.  At this time, it appears that she does not have any cardiac  ischemic changes.  I did recommend a follow-up appoint with her PCP, which was arranged in 9 days.  This is his first opening.  Given her chronic fatigue, her PCP will need to follow this.  Patient was in agreement with this plan and was suitable for discharge.  Her daughter was present to drive her home.  She lives with her daughter as well.     I have reviewed the nursing notes.    I have reviewed the findings, diagnosis, plan and need for follow up with the patient.          Medication List      There are no discharge medications for this visit.         Final diagnoses:   Fall   Rib contusion   Neck muscle spasm   Fatigue   This document serves as a record of services personally performed by Chris Lutz PA*. It was created on their behalf by Abimbola Nair, a trained medical scribe. The creation of this record is based on the provider's personal observations and the statements of the patient. This document has been checked and approved by the attending provider.  Note: Chart documentation done in part with Dragon Voice Recognition software. Although reviewed after completion, some word and grammatical errors may remain.    7/13/2019   Chris Lutz PA-C   [facility] EMERGENCY DEPARTMENT     Chris Lutz PA-C  07/14/19 0038

## 2019-07-13 NOTE — ED AVS SNAPSHOT
[facility] Emergency Department  [address] [place] [address]  Phone:  [phone]  Fax:  [phone]                                    Monik Santiago   MRN: [id number]    Department:  [facility] Emergency Department   Date of Visit:  7/13/2019           After Visit Summary Signature Page    I have received my discharge instructions, and my questions have been answered. I have discussed any challenges I see with this plan with the nurse or doctor.    ..........................................................................................................................................  Patient/Patient Representative Signature      ..........................................................................................................................................  Patient Representative Print Name and Relationship to Patient    ..................................................               ................................................  Date                                   Time    ..........................................................................................................................................  Reviewed by Signature/Title    ...................................................              ..............................................  Date                                               Time          22EPIC Rev 08/18

## 2019-07-14 NOTE — DISCHARGE INSTRUCTIONS
It was a pleasure working with you today!  I hope your condition continues to improve rapidly!     I am thankful that the trigger point injections helped your neck discomfort.  Please apply a heating pad to the neck muscles for 20 minutes at least one more time prior to going to bed.  I would like you to do this every 2-3 hours tomorrow if at all possible.  Try doing some neck range of motion exercises like we had you do here in the ED to help loosen up the muscles as well.    Thankfully, you did not break any ribs with your fall.  Rib bruising, otherwise called rib contusion, can be quite painful.  Please make sure that you continue to practice deep breathing exercises like we discussed.  It is okay to put ice on the painful rib areas for 20 minutes every couple hours for the next couple days.  Then, switch to heat.    Please follow-up with Dr. Mar regarding the ongoing fatigue.  Your labs were reassuring today, and did not show any specific cause for this.  Your heart checked out great this evening!!

## 2019-07-15 ENCOUNTER — NURSE TRIAGE (OUTPATIENT)
Dept: NURSING | Facility: CLINIC | Age: 79
End: 2019-07-15

## 2019-07-15 ENCOUNTER — HOSPITAL ENCOUNTER (EMERGENCY)
Facility: CLINIC | Age: 79
Discharge: HOME | End: 2019-07-15
Attending: NURSE PRACTITIONER | Admitting: NURSE PRACTITIONER
Payer: MEDICARE

## 2019-07-15 VITALS
SYSTOLIC BLOOD PRESSURE: 141 MMHG | TEMPERATURE: 98.9 F | RESPIRATION RATE: 20 BRPM | DIASTOLIC BLOOD PRESSURE: 77 MMHG | BODY MASS INDEX: 28.17 KG/M2 | WEIGHT: 159 LBS | OXYGEN SATURATION: 99 % | HEART RATE: 70 BPM

## 2019-07-15 DIAGNOSIS — S60.221A TRAUMATIC HEMATOMA OF RIGHT HAND, INITIAL ENCOUNTER: ICD-10-CM

## 2019-07-15 PROCEDURE — 99283 EMERGENCY DEPT VISIT LOW MDM: CPT | Mod: Z6 | Performed by: NURSE PRACTITIONER

## 2019-07-15 PROCEDURE — 99282 EMERGENCY DEPT VISIT SF MDM: CPT | Performed by: NURSE PRACTITIONER

## 2019-07-15 ASSESSMENT — ENCOUNTER SYMPTOMS
BRUISES/BLEEDS EASILY: 1
RESPIRATORY NEGATIVE: 1
CARDIOVASCULAR NEGATIVE: 1
COLOR CHANGE: 1

## 2019-07-15 NOTE — ED AVS SNAPSHOT
[facility] Emergency Department  [address] [place] [address]  Phone:  [phone]  Fax:  [phone]                                    Monik Santiago   MRN: [id number]    Department:  [facility] Emergency Department   Date of Visit:  7/15/2019           After Visit Summary Signature Page    I have received my discharge instructions, and my questions have been answered. I have discussed any challenges I see with this plan with the nurse or doctor.    ..........................................................................................................................................  Patient/Patient Representative Signature      ..........................................................................................................................................  Patient Representative Print Name and Relationship to Patient    ..................................................               ................................................  Date                                   Time    ..........................................................................................................................................  Reviewed by Signature/Title    ...................................................              ..............................................  Date                                               Time          22EPIC Rev 08/18

## 2019-07-15 NOTE — ED TRIAGE NOTES
Patient is concerned she may have an infection to her right hand that she thinks is related to an IV she had on Saturday. Right hand is red/swollen/warm.

## 2019-07-15 NOTE — TELEPHONE ENCOUNTER
Patient had an IV in ED yesterday; today hand is red, hot swollen and painful to wrist and getting worse; denies fever   Triage protocol reviewed   advised ED assessment  Tonight   understands and will comply   Lizette STEELE      Reason for Disposition    [1] Skin redness AND [2] extends > 1 inch (2.5 cm) from IV site    Additional Information    Negative: Severe difficulty breathing (e.g., struggling for each breath, speaks in single words)    Negative: Shock suspected (e.g., cold/pale/clammy skin, too weak to stand, low BP, rapid pulse)    Negative: Sounds like a life-threatening emergency to the triager    Negative: [1] Difficulty breathing AND [2] not severe    Negative: Arm is swollen, new onset (or leg swelling if IV in lower extremity)    Protocols used: IV SITE (SKIN) SYMPTOMS-A-AH

## 2019-07-15 NOTE — ED PROVIDER NOTES
History     Chief Complaint   Patient presents with     Hand Pain     HPI  Monik Santiago is a 79 year old female who presents to the emergency room with concern about hand, same hand that her IV was in when she was here on Sat.   She reports it looks worse since Sunday and it is very itchy and painful 7/10 pain.  Pt has a red area on the dorsum of right hand.  Patient has used ice with some relief in her symptoms.  Nothing really makes her symptoms worse.    Allergies:  Allergies   Allergen Reactions     Codeine Fatigue     Latex      Lidocaine Other (See Comments)     was one of 3 drugs given during GA that caused difficulty with anesthesia reversal     Sulfa Drugs Hives     Trimethoprim Hives     Valium Fatigue       Problem List:    Patient Active Problem List    Diagnosis Date Noted     Chest pain, atypical 11/19/2012     Priority: High     ASCVD (arteriosclerotic cardiovascular disease) 11/19/2012     Priority: High     Chest pain 08/17/2017     Priority: Medium     RUQ abdominal pain 12/03/2016     Priority: Medium     S/P laparoscopic cholecystectomy 12/03/2016     Priority: Medium     History of MRI of brain and brain stem 04/10/2015     Priority: Medium     MR BRAIN FOR STROKE COMPLETE W/O & W CONTRAST 4/9/2015 9:34 PM  MRI of the brain without and with contrast  MRA of the head without contrast  MRA of the neck without and with contrast  History: eval for PRESS,  Comparison: 3/19/2015,   Technique:   Brain: Axial diffusion-weighted with ADC map, T2-weighted with fat  saturation, T1-weighted and turboFLAIR and coronal T1-weighted images  of the brain were obtained without intravenous contrast. Following  intravenous administration of gadolinium, axial turboFLAIR and coronal  T1-weighted images of the brain were obtained.   MRA: 3D time-of-flight MR angiography of the major arteries at the  base of the brain was performed without contrast. 2D time-of-flight  MRA without contrast with superior and inferior  saturation bands and  3D T1-weighted postgadolinium MRA of the neck/cervical vessels were  also obtained. 3-dimensional reconstructions were created of the MRA  of both the head and the neck, which were reviewed by the radiologist.  Contrast: 10mL Gadavist  Findings: No areas of restricted diffusion. Marked volume loss  particularly affecting frontal, parietal and medial temporal lobe.s  Moderate confluent periventricular deep white white matter FLAIR  hyperintensities similar in appearance to previous exam. Some apparent  faint postcontrast T1 hyperintensity in the left cerebellar hemisphere  adjacent to the fourth ventricle is favored to be related to artifact.  No abnormal contrast enhancement is noted. Acute intracranial  hemorrhage or extra-axial collection. There is no hydrocephalus.  MR angiogram of the head: Posterior circulation appears patent.  Evaluation of anterior circulation is markedly limited due to motion  artifact. Bilateral intracranial internal carotid arteries are grossly  patent.  MR angiography of the neck demonstrates patent origins of the carotid  and vertebral arteries. There are codominant vertebral arteries which  are patent throughout the neck. Bilateral common carotid arteries,  carotid bifurcations and internal carotid arteries are patent.  IMPRESSION  Impression:   No acute infarction acute intracranial hemorrhage or extra-axial  collection. No hydrocephalus.  Marked parenchymal volume loss particularly affecting the frontal  convexity, parietal lobes and bilateral medial temporal lobes.  Confluent white matter T2 hyperintensities in the periventricular deep  white matter are most compatible with moderate chronic microvascular  ischemic changes.  MR angiogram of the head is partially limited; particularly the  anterior circulation evaluation is limited.   Neck MR angiogram demonstrates no hemodynamically significant  stenosis.  I have personally reviewed the examination and initial  interpretation  and I agree with the findings.  RAVINDER REYNOSO MD       Altered mental status 04/08/2015     Priority: Medium     Headache 04/08/2015     Priority: Medium     Problem list name updated by automated process. Provider to review       History of C.diff colitis 04/08/2015     Priority: Medium     Anxiety 04/08/2015     Priority: Medium     Lightheaded 03/07/2015     Priority: Medium     Lightheadedness 03/06/2015     Priority: Medium     Nausea without vomiting 02/06/2015     Priority: Medium     Problem list name updated by automated process. 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 Provider to review       Near syncope 05/20/2014     Priority: Medium     Coronary atherosclerosis of native coronary artery (VESSEL) 05/20/2014     Priority: Medium     Lichen sclerosus et atrophicus of the vulva 10/08/2013     Priority: Medium     CKD (chronic kidney disease) stage 3, GFR 30-59 ml/min (H) 11/19/2012     Priority: Medium     Acute worsening of stage 3 chronic kidney disease (H) 11/19/2012     Priority: Medium     Diagnosis updated by automated process. Provider to review and confirm.       NSTEMI (non-ST elevated myocardial infarction), history 10/06/2012     Priority: Medium     Acute myocardial  infarction (H) 10/05/2012     Priority: Medium     Hypoxia 10/05/2012     Priority: Medium     Hypertension goal BP (blood pressure) < 140/90 05/02/2011     Priority: Medium     Osteoarthritis 09/13/2010     Priority: Medium     GERD (gastroesophageal reflux disease)      Priority: Medium     Other alteration of consciousness 07/01/2007     Priority: Medium     see neuro consult 7/25/2007       Systolic CHF, chronic (H) 11/20/2012     Priority: Low     Anemia 11/20/2012     Priority: Low     DVT prophylaxis 11/20/2012     Priority: Low     Advanced directives, counseling/discussion 05/16/2012     Priority: Low     Advance Directive received and scanned. Click on Code in the patient header to view. 5/16/2012        Anabel Fournier, AMANDA CNP  07/15/19 1854         Hyperlipidemia LDL goal <70 10/31/2010     Priority: Low     Generalized anxiety disorder      Priority: Low        Past Medical History:    Past Medical History:   Diagnosis Date     Clostridium difficile diarrhea      Coronary artery disease      Generalized anxiety disorder      GERD (gastroesophageal reflux disease)      History of MRI of brain and brain stem 4/10/2015     Myocardial infarction (H)      Other alteration of consciousness 7/2007     Other and unspecified hyperlipidemia      Syncope 10/19/2009     Unspecified essential hypertension        Past Surgical History:    Past Surgical History:   Procedure Laterality Date     C NONSPECIFIC PROCEDURE      abd hernia repair     C TOTAL KNEE ARTHROPLASTY  08/23/10    Right knee     Cardiac stents       COLONOSCOPY N/A 2/17/2016    Procedure: COMBINED COLONOSCOPY, SINGLE OR MULTIPLE BIOPSY/POLYPECTOMY BY BIOPSY;  Surgeon: Jose Gan MD;  Location:  GI     ESOPHAGOSCOPY, GASTROSCOPY, DUODENOSCOPY (EGD), COMBINED N/A 12/17/2014    Procedure: COMBINED ESOPHAGOSCOPY, GASTROSCOPY, DUODENOSCOPY (EGD);  Surgeon: Kaz Mccoy MD;  Location:  GI     HC REMV CATARACT EXTRACAP,INSERT LENS,COMP       darrian     HC UGI ENDOSCOPY DIAG W BIOPSY  12/16/09     HC YAG LASER CAPSULOTOMY  9/17/2009    Right eye     HEART CATH, ANGIOPLASTY  10/03/2012    Stent of LAD     HEART CATH, ANGIOPLASTY  05/17/2014     CAD, patent stent of LAD     LAPAROSCOPIC CHOLECYSTECTOMY N/A 12/3/2016    Procedure: LAPAROSCOPIC CHOLECYSTECTOMY;  Surgeon: Viral Meyers MD;  Location: [facility] OR     LAPAROSCOPIC HERNIORRHAPHY HIATAL N/A 1/30/2015    Procedure: LAPAROSCOPIC HERNIORRHAPHY HIATAL;  Surgeon: Chase Camara MD;  Location: [facility] OR     LAPAROSCOPIC NISSEN FUNDOPLICATION N/A 1/30/2015    Procedure: LAPAROSCOPIC NISSEN FUNDOPLICATION;  Surgeon: Chase Camara MD;  Location: [facility] OR     MOHS MICROGRAPHIC PROCEDURE  09/14/11    left upper forehead-09/14/11       Family History:    Family History   Problem Relation Age of Onset     Cardiovascular Father      Cardiovascular Brother      Cardiovascular Mother      Hypertension Mother      Lipids Mother      Cardiovascular Sister         stents x 2-3     Hypertension Sister      Cardiovascular Sister         MI 64     Diabetes No family hx of        Social History:  Marital Status:   [5]  Social History     Tobacco Use     Smoking status: Never Smoker     Smokeless tobacco: Never Used   Substance Use Topics     Alcohol use: No     Alcohol/week: 0.0 oz     Drug use: No        Medications:      ACETAMINOPHEN PO   ASPIRIN PO   atorvastatin (LIPITOR) 40 MG tablet   DULoxetine (CYMBALTA) 20 MG capsule   estradiol (ESTRACE VAGINAL) 0.1 MG/GM cream   furosemide (LASIX) 20 MG tablet   halobetasol (ULTRAVATE) 0.05 % ointment   lisinopril (PRINIVIL/ZESTRIL) 20 MG tablet   loperamide (IMODIUM) 2 MG capsule   Lysine 500 MG TABS   metoprolol tartrate (LOPRESSOR) 25 MG tablet   Multiple Vitamins-Minerals (MULTIVITAMIN ADULT) CHEW   nitroGLYcerin (NITROSTAT) 0.4 MG sublingual tablet   Probiotic Product (PROBIOTIC DAILY PO)         Review of Systems   Respiratory: Negative.     Cardiovascular: Negative.    Musculoskeletal:        Pain and itching to the right hand   Skin: Positive for color change (redness on the top of hand).   Hematological: Bruises/bleeds easily.   All other systems reviewed and are negative.      Physical Exam   BP: 141/77  Pulse: 70  Temp: 98.9  F (37.2  C)  Resp: 20  Weight: 72.1 kg (159 lb)  SpO2: 99 %      Physical Exam   Constitutional: She is oriented to person, place, and time. She appears well-developed and well-nourished. No distress.   HENT:   Head: Normocephalic and atraumatic.   Eyes: EOM are normal.   Neck: Normal range of motion. Neck supple.   Cardiovascular: Normal rate and regular rhythm.   Pulmonary/Chest: Effort normal.   Abdominal: Soft. Bowel sounds are normal.   Neurological: She is alert and oriented to person, place, and time.   Skin: Skin is warm and dry.   Pt has a large hematoma covering the whole dorsal area of her right hand, starting to go up her middle digit, not fully engulfing the wrist.  Non-blanchable       ED Course        Procedures    No results found for this or any previous visit (from the past 24 hour(s)).    Medications - No data to display    Assessments & Plan (with Medical Decision Making)  Monik is a 79 year old who presents to the emergency room with a large hematoma on her right hand since IV use on Saturday 7/13.  Pt reporting it is itchy and painful.   Pt and daughter educated on differences between cellulitis and hematoma.  Advised to wear ACE wrap for one week to compress the area.  May take tylenol prn, avoid NSAID or ASA for pain.  May take baby ASA as prescribed.   Try to keep hand elevated if possible and ice.  Pt and daughter agreeable with plan.  Reasons to return to the emergency room were reviewed.    Pt discharged to home in stable condition with daughter.     I have reviewed the nursing notes.    I have reviewed the findings, diagnosis, plan and need for follow up with the patient.       Medication List       There are no discharge medications for this visit.         Final diagnoses:   Traumatic hematoma of right hand, initial encounter       7/15/2019   [facility] EMERGENCY DEPARTMENT

## 2019-07-16 ENCOUNTER — PATIENT OUTREACH (OUTPATIENT)
Dept: CARE COORDINATION | Facility: CLINIC | Age: 79
End: 2019-07-16

## 2019-07-16 DIAGNOSIS — S69.91XA HAND INJURY, RIGHT, INITIAL ENCOUNTER: Primary | ICD-10-CM

## 2019-07-16 NOTE — LETTER
[place] CARE COORDINATION    July 19, 2019    Monik Santiago  [address] [address]      Dear Monik,    I am a clinic care coordinator who works with Ken Mar MD at [facility]. I wanted to introduce myself and provide you with my contact information for you to be able to call me with any questions or concerns. I wanted to introduce myself and provide you with my contact information so that you can call me with questions or concerns about your health care. Below is a description of clinic care coordination and how I can further assist you.     The clinic care coordinator is a registered nurse and/or  who understand the health care system. The goal of clinic care coordination is to help you manage your health and improve access to the [facility] system in the most efficient manner. The registered nurse can assist you in meeting your health care goals by providing education, coordinating services, and strengthening the communication among your providers. The  can assist you with financial, behavioral, psychosocial, chemical dependency, counseling, and/or psychiatric resources.    Please feel free to contact me at [phone], with any questions or concerns. We at [facility] are focused on providing you with the highest-quality healthcare experience possible and that all starts with you.     Sincerely,           Alicia DELANEY, RN, PHN  Care Coordination    [facility]  [address] [place] [address]  Office: [phone]  Fax [phone]   [facility]  [address] [place] [address]  Office: [phone] Fax [phone]  [email or web address]   [email or web address]   Connect with [facility] on social media.

## 2019-07-18 NOTE — PROGRESS NOTES
Clinic Care Coordination Contact  [facility]/Voicemail       Clinical Data: Care Coordinator Outreach  Outreach attempted x 1.  Left message on voicemail with call back information and requested return call.  Plan: Care Coordinator will try to reach patient again in 1-2 business days.      Alicia DELANEY, RN, PHN  Care Coordination    [facility]  [address] [place] [address]  Office: [phone]  Fax [phone]   [facility]  [address] [place] [address]  Office: [phone] Fax [phone]  [email or web address]   [email or web address]   Connect with [facility] on social media.

## 2019-07-19 NOTE — PROGRESS NOTES
Clinic Care Coordination Contact  [facility]/Voicemail    Referral Source: IP Report  Clinical Data: Care Coordinator Outreach  Outreach attempted x 2.  Left message on voicemail with call back information and requested return call.  Plan: Care Coordinator will mail out care coordination introduction letter with care coordinator contact information and explanation of care coordination services. Care Coordinator will do no further outreaches at this time.      Alicia SOLOMONN, RN, PHN  Care Coordination    [facility]  [address] [place] [address]  Office: [phone]  Fax [phone]   [facility]  [address] [place] [address]  Office: [phone] Fax [phone]  [email or web address]   [email or web address]   Connect with [facility] on social media.

## 2019-08-11 DIAGNOSIS — F43.23 ADJUSTMENT DISORDER WITH MIXED ANXIETY AND DEPRESSED MOOD: ICD-10-CM

## 2019-08-13 RX ORDER — DULOXETIN HYDROCHLORIDE 20 MG/1
CAPSULE, DELAYED RELEASE ORAL
Qty: 180 CAPSULE | Refills: 0 | Status: SHIPPED | OUTPATIENT
Start: 2019-08-13 | End: 2020-07-20

## 2019-08-13 NOTE — TELEPHONE ENCOUNTER
"  Requested Prescriptions   Pending Prescriptions Disp Refills     DULoxetine (CYMBALTA) 20 MG capsule [Pharmacy Med Name: DULOXETINE HCL DR 20 MG CAP] 180 capsule 0     Sig: TAKE 1 CAPSULE BY MOUTH TWICE A DAY   Last Written Prescription Date:  7/12/2019  Last Fill Quantity: 90,  # refills: 3   Last office visit: 7/12/2019 with prescribing provider:     Future Office Visit:        Serotonin-Norepinephrine Reuptake Inhibitors  Failed - 8/11/2019  8:28 AM        Failed - Blood pressure under 140/90 in past 12 months     BP Readings from Last 3 Encounters:   07/15/19 141/77   07/13/19 144/74   07/12/19 138/66           Passed - PHQ-9 score of less than 5 in past 6 months     Please review last PHQ-9 score.   PHQ-9 score:    PHQ-9 SCORE 7/12/2019   PHQ-9 Total Score 1           Passed - Medication is active on med list        Passed - Patient is age 18 or older        Passed - No active pregnancy on record        Passed - No positive pregnancy test in past 12 months        Passed - Recent (6 mo) or future (30 days) visit within the authorizing provider's specialty     Patient had office visit in the last 6 months or has a visit in the next 30 days with authorizing provider or within the authorizing provider's specialty.  See \"Patient Info\" tab in inbasket, or \"Choose Columns\" in Meds & Orders section of the refill encounter.          Prescription approved per [facility] [place] [place] Refill Protocol.  Barbara Colby RN      "

## 2019-08-22 DIAGNOSIS — I10 ESSENTIAL HYPERTENSION WITH GOAL BLOOD PRESSURE LESS THAN 140/90: ICD-10-CM

## 2019-08-22 RX ORDER — METOPROLOL TARTRATE 25 MG/1
TABLET, FILM COATED ORAL
Qty: 180 TABLET | Refills: 2 | OUTPATIENT
Start: 2019-08-22

## 2019-08-22 NOTE — TELEPHONE ENCOUNTER
"Duplicate  Denied  Requested Prescriptions   Pending Prescriptions Disp Refills     metoprolol tartrate (LOPRESSOR) 25 MG tablet [Pharmacy Med Name: METOPROLOL TARTRATE 25 MG TAB] 180 tablet 2     Sig: TAKE 1 TABLET (25 MG) BY MOUTH 2 TIMES DAILY   Last Written Prescription Date:  7/12/2019  Last Fill Quantity: 260,  # refills: 2   Last office visit: 7/12/2019 with prescribing provider:     Future Office Visit:        Beta-Blockers Protocol Failed - 8/22/2019  1:56 AM        Failed - Blood pressure under 140/90 in past 12 months     BP Readings from Last 3 Encounters:   07/15/19 141/77   07/13/19 144/74   07/12/19 138/66           Passed - Patient is age 6 or older        Passed - Recent (12 mo) or future (30 days) visit within the authorizing provider's specialty     Patient had office visit in the last 12 months or has a visit in the next 30 days with authorizing provider or within the authorizing provider's specialty.  See \"Patient Info\" tab in inbasket, or \"Choose Columns\" in Meds & Orders section of the refill encounter.              Passed - Medication is active on med list      Barbara Colby RN      "

## 2019-10-17 DIAGNOSIS — I10 ESSENTIAL HYPERTENSION WITH GOAL BLOOD PRESSURE LESS THAN 140/90: ICD-10-CM

## 2019-10-17 RX ORDER — LISINOPRIL 20 MG/1
TABLET ORAL
Qty: 180 TABLET | Refills: 1 | Status: SHIPPED | OUTPATIENT
Start: 2019-10-17 | End: 2020-04-24

## 2019-10-17 NOTE — TELEPHONE ENCOUNTER
"Lisinopril  Last Written Prescription Date:  09/14/2018  Last Fill Quantity: 180,  # refills: 3   Last office visit: 7/12/2019 with prescribing provider:  Zaid   Future Office Visit:  None  Prescription approved per [facility] Refill Protocol.    Requested Prescriptions   Pending Prescriptions Disp Refills     lisinopril (PRINIVIL/ZESTRIL) 20 MG tablet [Pharmacy Med Name: LISINOPRIL 20 MG TABLET] 180 tablet 3     Sig: TAKE 1 TABLET (20 MG) BY MOUTH 2 TIMES DAILY       ACE Inhibitors (Including Combos) Protocol Failed - 10/17/2019  1:44 AM        Failed - Blood pressure under 140/90 in past 12 months     BP Readings from Last 3 Encounters:   07/15/19 141/77   07/13/19 144/74   07/12/19 138/66           Passed - Recent (12 mo) or future (30 days) visit within the authorizing provider's specialty     Patient has had an office visit with the authorizing provider or a provider within the authorizing providers department within the previous 12 mos or has a future within next 30 days. See \"Patient Info\" tab in inbasket, or \"Choose Columns\" in Meds & Orders section of the refill encounter.          Passed - Medication is active on med list        Passed - Patient is age 18 or older        Passed - No active pregnancy on record        Passed - Normal serum creatinine on file in past 12 months     Recent Labs   Lab Test 07/13/19  1823   CR 0.89           Passed - Normal serum potassium on file in past 12 months     Recent Labs   Lab Test 07/13/19  1823   POTASSIUM 3.7           Passed - No positive pregnancy test within past 12 months      Lesly Shahid RN   "

## 2019-12-02 ENCOUNTER — TELEPHONE (OUTPATIENT)
Dept: INTERNAL MEDICINE | Facility: CLINIC | Age: 79
End: 2019-12-02

## 2019-12-02 DIAGNOSIS — F43.23 ADJUSTMENT DISORDER WITH MIXED ANXIETY AND DEPRESSED MOOD: ICD-10-CM

## 2019-12-02 RX ORDER — DULOXETIN HYDROCHLORIDE 20 MG/1
20 CAPSULE, DELAYED RELEASE ORAL 2 TIMES DAILY
Qty: 180 CAPSULE | Refills: 3 | Status: SHIPPED | OUTPATIENT
Start: 2019-12-02

## 2019-12-02 NOTE — TELEPHONE ENCOUNTER
Reason for Call:  Medication or medication refill:    Do you use a [facility]?  Name of the pharmacy and phone number for the current request:  [facility] in [place]     Name of the medication requested: DULoxetine (CYMBALTA) 20 MG capsule    Other request: Scarlet calling stating she feels like Monik's DULoxetine (CYMBALTA) 20 MG capsule needs to be increased. States Viry mood has been sad and crying a lot more lately. Please advise     Can we leave a detailed message on this number? Not Applicable    Phone number patient can be reached at: Home number on file [phone] (home)    Best Time:      Call taken on 12/2/2019 at 12:59 PM by Jacquelin Briceño

## 2019-12-02 NOTE — TELEPHONE ENCOUNTER
Patient was informed that she can go to twice daily dosing of the 20 mg pill. A new prescription has been sent to [facility] in [place].    Raj Escoto CMA

## 2019-12-18 DIAGNOSIS — I10 ESSENTIAL HYPERTENSION WITH GOAL BLOOD PRESSURE LESS THAN 140/90: ICD-10-CM

## 2019-12-18 RX ORDER — METOPROLOL TARTRATE 25 MG/1
50 TABLET, FILM COATED ORAL 2 TIMES DAILY
Qty: 120 TABLET | Refills: 0 | Status: SHIPPED | OUTPATIENT
Start: 2019-12-18 | End: 2020-03-03

## 2019-12-18 NOTE — TELEPHONE ENCOUNTER
"Routing refill request to provider for review/approval because:  BP out of range  T'd up 1 month for provider review.      Requested Prescriptions   Pending Prescriptions Disp Refills     metoprolol tartrate (LOPRESSOR) 25 MG tablet [Pharmacy Med Name: METOPROLOL TARTRATE 25 MG TAB] 120 tablet 6     Sig: TAKE 2 TABLETS (50 MG) BY MOUTH 2 TIMES DAILY   Last Written Prescription Date:  7/12/2019  Last Fill Quantity: 260,  # refills: 2   Last office visit: 7/12/2019 with prescribing provider:     Future Office Visit:        Beta-Blockers Protocol Failed - 12/18/2019  2:04 AM        Failed - Blood pressure under 140/90 in past 12 months     BP Readings from Last 3 Encounters:   07/15/19 141/77   07/13/19 144/74   07/12/19 138/66           Passed - Patient is age 6 or older        Passed - Recent (12 mo) or future (30 days) visit within the authorizing provider's specialty     Patient has had an office visit with the authorizing provider or a provider within the authorizing providers department within the previous 12 mos or has a future within next 30 days. See \"Patient Info\" tab in inbasket, or \"Choose Columns\" in Meds & Orders section of the refill encounter.              Passed - Medication is active on med list      Barbara Colby RN      "

## 2020-02-20 DIAGNOSIS — M79.89 SWELLING OF LOWER LIMB: ICD-10-CM

## 2020-02-20 RX ORDER — FUROSEMIDE 20 MG
TABLET ORAL
Qty: 90 TABLET | Refills: 2 | Status: SHIPPED | OUTPATIENT
Start: 2020-02-20 | End: 2020-12-02

## 2020-02-20 NOTE — TELEPHONE ENCOUNTER
Routing refill request to covering provider for review/approval because:  Labs out of range:  Sodium  Blood pressure    Jackeline Redman RN BSN

## 2020-02-20 NOTE — TELEPHONE ENCOUNTER
"Furosemide  Last Written Prescription Date:  05/08/2019  Last Fill Quantity: 90,  # refills: 2   Last office visit: 7/12/2019 with prescribing provider:  Ziad  Future Office Visit:  None    Requested Prescriptions   Pending Prescriptions Disp Refills     FUROSEMIDE 20 MG PO tablet [Pharmacy Med Name: FUROSEMIDE 20 MG TABLET] 90 tablet 2     Sig: TAKE 1 TABLET BY MOUTH EVERY DAY       Diuretics (Including Combos) Protocol Failed - 2/20/2020  1:58 PM        Failed - Blood pressure under 140/90 in past 12 months     BP Readings from Last 3 Encounters:   07/15/19 141/77   07/13/19 144/74   07/12/19 138/66                 Failed - Normal serum sodium on file in past 12 months     Recent Labs   Lab Test 07/13/19  1823   *              Passed - Recent (12 mo) or future (30 days) visit within the authorizing provider's specialty     Patient has had an office visit with the authorizing provider or a provider within the authorizing providers department within the previous 12 mos or has a future within next 30 days. See \"Patient Info\" tab in inbasket, or \"Choose Columns\" in Meds & Orders section of the refill encounter.              Passed - Medication is active on med list        Passed - Patient is age 18 or older        Passed - No active pregancy on record        Passed - Normal serum creatinine on file in past 12 months     Recent Labs   Lab Test 07/13/19  1823   CR 0.89              Passed - Normal serum potassium on file in past 12 months     Recent Labs   Lab Test 07/13/19  1823   POTASSIUM 3.7                    Passed - No positive pregnancy test in past 12 months            "

## 2020-02-23 ENCOUNTER — APPOINTMENT (OUTPATIENT)
Dept: CT IMAGING | Facility: CLINIC | Age: 80
End: 2020-02-23
Attending: FAMILY MEDICINE
Payer: MEDICARE

## 2020-02-23 ENCOUNTER — HOSPITAL ENCOUNTER (EMERGENCY)
Facility: CLINIC | Age: 80
Discharge: HOME | End: 2020-02-23
Attending: FAMILY MEDICINE | Admitting: FAMILY MEDICINE
Payer: MEDICARE

## 2020-02-23 VITALS
TEMPERATURE: 97.4 F | RESPIRATION RATE: 18 BRPM | DIASTOLIC BLOOD PRESSURE: 78 MMHG | HEART RATE: 60 BPM | SYSTOLIC BLOOD PRESSURE: 170 MMHG | BODY MASS INDEX: 28.17 KG/M2 | WEIGHT: 159 LBS | OXYGEN SATURATION: 92 % | HEIGHT: 63 IN

## 2020-02-23 DIAGNOSIS — S19.9XXA INJURY OF NECK, INITIAL ENCOUNTER: ICD-10-CM
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
## 2020-02-23 DIAGNOSIS — S09.90XA CLOSED HEAD INJURY, INITIAL ENCOUNTER: ICD-10-CM

## 2020-02-23 PROCEDURE — 99285 EMERGENCY DEPT VISIT HI MDM: CPT | Mod: 25 | Performed by: FAMILY MEDICINE

## 2020-02-23 PROCEDURE — 25000132 ZZH RX MED GY IP 250 OP 250 PS 637: Mod: GY | Performed by: FAMILY MEDICINE

## 2020-02-23 PROCEDURE — 71250 CT THORAX DX C-: CPT

## 2020-02-23 PROCEDURE — 99284 EMERGENCY DEPT VISIT MOD MDM: CPT | Mod: Z6 | Performed by: FAMILY MEDICINE

## 2020-02-23 PROCEDURE — 72125 CT NECK SPINE W/O DYE: CPT

## 2020-02-23 PROCEDURE — 70450 CT HEAD/BRAIN W/O DYE: CPT

## 2020-02-23 RX ORDER — OXYCODONE HYDROCHLORIDE 5 MG/1
5 TABLET ORAL EVERY 6 HOURS PRN
Qty: 12 TABLET | Refills: 0 | Status: SHIPPED | OUTPATIENT
Start: 2020-02-23

## 2020-02-23 RX ORDER — HYDROMORPHONE HYDROCHLORIDE 1 MG/ML
0.3 INJECTION, SOLUTION INTRAMUSCULAR; INTRAVENOUS; SUBCUTANEOUS ONCE
Status: DISCONTINUED | OUTPATIENT
Start: 2020-02-23 | End: 2020-02-23 | Stop reason: HOSPADM

## 2020-02-23 RX ORDER — OXYCODONE HYDROCHLORIDE 5 MG/1
10 TABLET ORAL ONCE
Status: COMPLETED | OUTPATIENT
Start: 2020-02-23 | End: 2020-02-23

## 2020-02-23 RX ADMIN — OXYCODONE HYDROCHLORIDE 10 MG: 5 TABLET ORAL at 20:08

## 2020-02-23 ASSESSMENT — MIFFLIN-ST. JEOR: SCORE: 1165.35

## 2020-02-23 NOTE — ED AVS SNAPSHOT
[facility]  [address] [place] [address]  Phone:  [phone]  Fax:  [phone]                                    Monik Santiago   MRN: [id number]    Department:  [facility]   Date of Visit:  2/23/2020           After Visit Summary Signature Page    I have received my discharge instructions, and my questions have been answered. I have discussed any challenges I see with this plan with the nurse or doctor.    ..........................................................................................................................................  Patient/Patient Representative Signature      ..........................................................................................................................................  Patient Representative Print Name and Relationship to Patient    ..................................................               ................................................  Date                                   Time    ..........................................................................................................................................  Reviewed by Signature/Title    ...................................................              ..............................................  Date                                               Time          22EPIC Rev 08/18

## 2020-02-24 NOTE — ED PROVIDER NOTES
[facility] ED Provider Note   Patient: Monik Santiago  MRN #:  [id number]  Date of Visit: February 23, 2020    CC:     Chief Complaint   Patient presents with     Fall     HPI:  Monik Santiago is a 79 year old female who presented to the emergency department via EMS after falling on the ice less than an hour ago. The patient stepped down onto the sidewalk and fell backwards, hitting her shoulder and neck on the cement steps. She did not have any loss of consciousness. The patient's family heard her fall and was able to help her right away. They kept her laying on the ground due to her neck pain until the ambulance arrived. The patient currently feels very tired, dizzy, and a headache in the back of her head. She has neck pain and shoulder pain. She denies any numbness or tingling into her arms. No chest pain or trouble breathing. She currently rates her pain at 5/10. Patient is not on any blood thinners.     Problem List:  Patient Active Problem List    Diagnosis Date Noted     Chest pain, atypical 11/19/2012     Priority: High     ASCVD (arteriosclerotic cardiovascular disease) 11/19/2012     Priority: High     Chest pain 08/17/2017     Priority: Medium     RUQ abdominal pain 12/03/2016     Priority: Medium     S/P laparoscopic cholecystectomy 12/03/2016     Priority: Medium     History of MRI of brain and brain stem 04/10/2015     Priority: Medium     MR BRAIN FOR STROKE COMPLETE W/O & W CONTRAST 4/9/2015 9:34 PM  MRI of the brain without and with contrast  MRA of the head without contrast  MRA of the neck without and with contrast  History: eval for PRESS,  Comparison: 3/19/2015,   Technique:   Brain: Axial diffusion-weighted with ADC map, T2-weighted with fat  saturation, T1-weighted and turboFLAIR and coronal T1-weighted images  of the brain were obtained without intravenous contrast. Following  intravenous administration of gadolinium, axial  turboFLAIR and coronal  T1-weighted images of the brain were obtained.   MRA: 3D time-of-flight MR angiography of the major arteries at the  base of the brain was performed without contrast. 2D time-of-flight  MRA without contrast with superior and inferior saturation bands and  3D T1-weighted postgadolinium MRA of the neck/cervical vessels were  also obtained. 3-dimensional reconstructions were created of the MRA  of both the head and the neck, which were reviewed by the radiologist.  Contrast: 10mL Gadavist  Findings: No areas of restricted diffusion. Marked volume loss  particularly affecting frontal, parietal and medial temporal lobe.s  Moderate confluent periventricular deep white white matter FLAIR  hyperintensities similar in appearance to previous exam. Some apparent  faint postcontrast T1 hyperintensity in the left cerebellar hemisphere  adjacent to the fourth ventricle is favored to be related to artifact.  No abnormal contrast enhancement is noted. Acute intracranial  hemorrhage or extra-axial collection. There is no hydrocephalus.  MR angiogram of the head: Posterior circulation appears patent.  Evaluation of anterior circulation is markedly limited due to motion  artifact. Bilateral intracranial internal carotid arteries are grossly  patent.  MR angiography of the neck demonstrates patent origins of the carotid  and vertebral arteries. There are codominant vertebral arteries which  are patent throughout the neck. Bilateral common carotid arteries,  carotid bifurcations and internal carotid arteries are patent.  IMPRESSION  Impression:   No acute infarction acute intracranial hemorrhage or extra-axial  collection. No hydrocephalus.  Marked parenchymal volume loss particularly affecting the frontal  convexity, parietal lobes and bilateral medial temporal lobes.  Confluent white matter T2 hyperintensities in the periventricular deep  white matter are most compatible with moderate chronic  microvascular  ischemic changes.  MR angiogram of the head is partially limited; particularly the  anterior circulation evaluation is limited.   Neck MR angiogram demonstrates no hemodynamically significant  stenosis.  I have personally reviewed the examination and initial interpretation  and I agree with the findings.  RAVINDER REYNOSO MD       Altered mental status 04/08/2015     Priority: Medium     Headache 04/08/2015     Priority: Medium     Problem list name updated by automated process. Provider to review       History of C.diff colitis 04/08/2015     Priority: Medium     Anxiety 04/08/2015     Priority: Medium     Lightheaded 03/07/2015     Priority: Medium     Lightheadedness 03/06/2015     Priority: Medium     Nausea without vomiting 02/06/2015     Priority: Medium     Problem list name updated by automated process. Provider to review       Recent repair of hiatal hernia 1/30/15 02/06/2015     Priority: Medium     Urine retention - singh placed 2/4/15 02/06/2015     Priority: Medium     Bilateral leg edema 02/06/2015     Priority: Medium     Old myocardial infarction 2012 02/06/2015     Priority: Medium     Pseudoseizure 01/30/2015     Priority: Medium     Acidosis-metabolic 01/30/2015     Priority: Medium     Syncope 06/16/2014     Priority: Medium     Health Care Home 05/27/2014     Priority: Medium     State Tier Level:  Tier 3  Status:  Closed  Care Coordinator:  Michelle Gaytan RN, BSN    See Letters for [facility] Care Plan             Abdominal pain, unspecified abdominal location 05/21/2014     Priority: Medium     Problem list name updated by automated process. Provider to review       Near syncope 05/20/2014     Priority: Medium     Coronary atherosclerosis of native coronary artery (VESSEL) 05/20/2014     Priority: Medium     Lichen sclerosus et atrophicus of the vulva 10/08/2013     Priority: Medium     CKD (chronic kidney disease) stage 3, GFR 30-59 ml/min (H) 11/19/2012     Priority: Medium     Acute  worsening of stage 3 chronic kidney disease (H) 11/19/2012     Priority: Medium     Diagnosis updated by automated process. Provider to review and confirm.       NSTEMI (non-ST elevated myocardial infarction), history 10/06/2012     Priority: Medium     Acute myocardial infarction (H) 10/05/2012     Priority: Medium     Hypoxia 10/05/2012     Priority: Medium     Hypertension goal BP (blood pressure) < 140/90 05/02/2011     Priority: Medium     Osteoarthritis 09/13/2010     Priority: Medium     GERD (gastroesophageal reflux disease)      Priority: Medium     Other alteration of consciousness 07/01/2007     Priority: Medium     see neuro consult 7/25/2007       Systolic CHF, chronic (H) 11/20/2012     Priority: Low     Anemia 11/20/2012     Priority: Low     DVT prophylaxis 11/20/2012     Priority: Low     Advanced directives, counseling/discussion 05/16/2012     Priority: Low     Advance Directive received and scanned. Click on Code in the patient header to view. 5/16/2012          Hyperlipidemia LDL goal <70 10/31/2010     Priority: Low     Generalized anxiety disorder      Priority: Low       Past Medical History:   Diagnosis Date     Clostridium difficile diarrhea      Coronary artery disease      Generalized anxiety disorder      GERD (gastroesophageal reflux disease)      History of MRI of brain and brain stem 4/10/2015     Myocardial infarction (H)      Other alteration of consciousness 7/2007     Other and unspecified hyperlipidemia      Syncope 10/19/2009     Unspecified essential hypertension        MEDS: oxyCODONE (ROXICODONE) 5 MG tablet  ACETAMINOPHEN PO  ASPIRIN PO  atorvastatin (LIPITOR) 40 MG tablet  DULoxetine (CYMBALTA) 20 MG capsule  DULoxetine (CYMBALTA) 20 MG capsule  estradiol (ESTRACE VAGINAL) 0.1 MG/GM cream  FUROSEMIDE 20 MG PO tablet  halobetasol (ULTRAVATE) 0.05 % ointment  lisinopril (PRINIVIL/ZESTRIL) 20 MG tablet  loperamide (IMODIUM) 2 MG capsule  Lysine 500 MG TABS  metoprolol tartrate  (LOPRESSOR) 25 MG tablet  Multiple Vitamins-Minerals (MULTIVITAMIN ADULT) CHEW  nitroGLYcerin (NITROSTAT) 0.4 MG sublingual tablet  Probiotic Product (PROBIOTIC DAILY PO)        ALLERGIES:    Allergies   Allergen Reactions     Codeine Fatigue     Latex      Lidocaine Other (See Comments)     was one of 3 drugs given during GA that caused difficulty with anesthesia reversal     Sulfa Drugs Hives     Trimethoprim Hives     Valium Fatigue       Past Surgical History:   Procedure Laterality Date     C NONSPECIFIC PROCEDURE      abd hernia repair     C TOTAL KNEE ARTHROPLASTY  08/23/10    Right knee     Cardiac stents       COLONOSCOPY N/A 2/17/2016    Procedure: COMBINED COLONOSCOPY, SINGLE OR MULTIPLE BIOPSY/POLYPECTOMY BY BIOPSY;  Surgeon: Jose Gan MD;  Location: [facility]     ESOPHAGOSCOPY, GASTROSCOPY, DUODENOSCOPY (EGD), COMBINED N/A 12/17/2014    Procedure: COMBINED ESOPHAGOSCOPY, GASTROSCOPY, DUODENOSCOPY (EGD);  Surgeon: Kaz Mccoy MD;  Location: [facility]     HC REMV CATARACT EXTRACAP,INSERT LENS,COMP      darrian     HC UGI ENDOSCOPY DIAG W BIOPSY  12/16/09     HC YAG LASER CAPSULOTOMY  9/17/2009    Right eye     HEART CATH, ANGIOPLASTY  10/03/2012    Stent of LAD     HEART CATH, ANGIOPLASTY  05/17/2014     CAD, patent stent of LAD     LAPAROSCOPIC CHOLECYSTECTOMY N/A 12/3/2016    Procedure: LAPAROSCOPIC CHOLECYSTECTOMY;  Surgeon: Viral Meyers MD;  Location: [facility]     LAPAROSCOPIC HERNIORRHAPHY HIATAL N/A 1/30/2015    Procedure: LAPAROSCOPIC HERNIORRHAPHY HIATAL;  Surgeon: Chase Camara MD;  Location: [facility]     LAPAROSCOPIC NISSEN FUNDOPLICATION N/A 1/30/2015    Procedure: LAPAROSCOPIC NISSEN FUNDOPLICATION;  Surgeon: Chase Camara MD;  Location: [facility]     MOHS MICROGRAPHIC PROCEDURE  09/14/11    left upper forehead-09/14/11       Social History     Tobacco Use     Smoking status: Never Smoker     Smokeless tobacco: Never Used   Substance Use Topics     Alcohol use: No      "Alcohol/week: 0.0 standard drinks     Drug use: No         Review of Systems   Except as noted in HPI, all other systems were reviewed and are negative    Physical Exam     Vitals were reviewed  Patient Vitals for the past 12 hrs:   BP Temp Temp src Pulse Heart Rate Resp SpO2 Height Weight   02/23/20 2100 (!) 170/78 97.4  F (36.3  C) Oral 60 -- -- 92 % -- --   02/23/20 2013 -- -- -- -- -- -- 96 % -- --   02/23/20 2011 (!) 151/75 -- -- 57 -- -- -- -- --   02/23/20 1924 (!) 191/84 97.7  F (36.5  C) Oral -- 64 18 96 % 1.6 m (5' 3\") 72.1 kg (159 lb)     GENERAL APPEARANCE: Alert and oriented x3.  HEAD: No cephalhematoma; scalp is intact without laceration  FACE: normal facies  EYES: Pupils are equal and reactive to light  HENT: normal external exam; TMs are intact and appear normal; oral exam is benign; no dental trauma;  NECK: Diffuse neck tenderness both bilaterally as well as midline; patient is requesting removal of her c-collar that was just applied  RESP: normal respiratory effort; clear breath sounds bilaterally  CV: regular rate and rhythm; no significant murmurs, gallops or rubs  ABD: soft, no tenderness; no rebound or guarding; bowel sounds are normal  MS: Tenderness in the upper posterior thoracic back; pelvis is stable.  EXT: Atraumatic  SKIN: no worrisome rash  NEURO: no facial droop; no focal deficits, speech is normal        Available Lab/Imaging Results     Results for orders placed or performed during the hospital encounter of 02/23/20 (from the past 24 hour(s))   Chest CT w/o contrast    Narrative    CT CHEST WITHOUT CONTRAST 2/23/2020 8:11 PM     HISTORY: Posterior thoracic back, neck and head trauma.    COMPARISON: None.    TECHNIQUE: Volumetric helical acquisition of CT images of the chest  from the clavicles to the kidneys were acquired without IV contrast.  Radiation dose for this scan was reduced using automated exposure  control, adjustment of the mA and/or kV according to patient size, " or  iterative reconstruction technique.    FINDINGS:  No pneumothorax. No definite displaced rib fracture.  Thoracolumbar scoliosis with multilevel degenerative disc disease. No  definite compression deformity demonstrated. If there is high clinical  suspicion for thoracic spine fracture, dedicated spine reconstructions  recommended for evaluation. Trace peripheral fibrosis and/or  atelectasis. No effusions. There are extensive coronary vascular  calcifications and/or stents consistent with coronary artery disease.  There are mild atherosclerotic changes of the visualized aorta and its  branches. There is no evidence of aortic aneurysm. No acute findings  in the visualized upper abdomen.      Impression    IMPRESSION: No acute intrathoracic process demonstrated.    CORONA CHAVEZ MD   Head CT w/o contrast    Narrative    CT OF THE HEAD AND CERVICAL SPINE WITHOUT CONTRAST   2/23/2020 8:14 PM     COMPARISON: Head CT 1-16    HISTORY:  Traumatic head and neck injury. Fall down steps.     TECHNIQUE:   HEAD CT: Axial CT images of the head from the skull base to the vertex  were acquired without IV contrast.  CERVICAL SPINE CT: Axial images of the cervical spine were acquired  without intravenous contrast. Multiplanar reformations were created.      FINDINGS:   HEAD CT:  INTRACRANIAL CONTENTS: No intracranial hemorrhage, extraaxial  collection, or mass effect.  No CT evidence of acute infarct. Moderate  presumed chronic small vessel ischemic change. Moderate generalized  volume loss.    VISUALIZED ORBITS/SINUSES/MASTOIDS: No significant orbital  abnormality. Small left antral air-fluid level. No significant middle  ear or mastoid effusion.    OSSEOUS STRUCTURES/SOFT TISSUES: No significant abnormality.    CERVICAL SPINE CT:  VERTEBRA: Normal arterial body heights. No fracture or posttraumatic  subluxation. Severe left and then axial degenerative change.    CANAL/FORAMINA: Mild left C3-4, moderate to severe bilateral C4-5  and  moderate to severe bilateral C5-6 and moderate to severe bilateral  C6-7 neural foraminal stenoses. Degenerative change also results in  mild C4-5 through C6-7 spinal canal stenosis..    PARASPINAL: No extraspinal abnormality. Visualized lung fields are  clear.      Impression    IMPRESSION:  HEAD CT:  1.  No acute abnormality.  2.  Moderate generalized volume loss and presumed chronic small vessel  ischemic change.  3.  Small left maxillary air-fluid level.  4.  No significant change.    CERVICAL SPINE CT:  1.  No CT evidence for acute fracture or post traumatic subluxation.    Radiation dose for this scan was reduced using automated exposure  control, adjustment of the mA and/or kV according to patient size, or  iterative reconstruction technique    KURTIS ALVARENGA MD   Cervical spine CT w/o contrast    Narrative    CT OF THE HEAD AND CERVICAL SPINE WITHOUT CONTRAST   2/23/2020 8:14 PM     COMPARISON: Head CT 1-16    HISTORY:  Traumatic head and neck injury. Fall down steps.     TECHNIQUE:   HEAD CT: Axial CT images of the head from the skull base to the vertex  were acquired without IV contrast.  CERVICAL SPINE CT: Axial images of the cervical spine were acquired  without intravenous contrast. Multiplanar reformations were created.      FINDINGS:   HEAD CT:  INTRACRANIAL CONTENTS: No intracranial hemorrhage, extraaxial  collection, or mass effect.  No CT evidence of acute infarct. Moderate  presumed chronic small vessel ischemic change. Moderate generalized  volume loss.    VISUALIZED ORBITS/SINUSES/MASTOIDS: No significant orbital  abnormality. Small left antral air-fluid level. No significant middle  ear or mastoid effusion.    OSSEOUS STRUCTURES/SOFT TISSUES: No significant abnormality.    CERVICAL SPINE CT:  VERTEBRA: Normal arterial body heights. No fracture or posttraumatic  subluxation. Severe left and then axial degenerative change.    CANAL/FORAMINA: Mild left C3-4, moderate to severe bilateral C4-5  and  moderate to severe bilateral C5-6 and moderate to severe bilateral  C6-7 neural foraminal stenoses. Degenerative change also results in  mild C4-5 through C6-7 spinal canal stenosis..    PARASPINAL: No extraspinal abnormality. Visualized lung fields are  clear.      Impression    IMPRESSION:  HEAD CT:  1.  No acute abnormality.  2.  Moderate generalized volume loss and presumed chronic small vessel  ischemic change.  3.  Small left maxillary air-fluid level.  4.  No significant change.    CERVICAL SPINE CT:  1.  No CT evidence for acute fracture or post traumatic subluxation.    Radiation dose for this scan was reduced using automated exposure  control, adjustment of the mA and/or kV according to patient size, or  iterative reconstruction technique    KURTIS ALVARENGA MD              Impression     Final diagnoses:   Fall due to slipping on ice or snow, initial encounter   Closed head injury, initial encounter   Injury of neck and upper back         ED Course & Medical Decision Making   Monik Santiago is a 79 year old female who presented to the emergency department by EMS after falling in the ice prior to arrival.  Patient states that she was stepping down onto the sidewalk, and fell backwards hitting her upper back and shoulder, neck and head on the cement steps.  She did not have loss of consciousness.  Patient was attended to right away by family, and they try to keep her warm.  They were able to sit her up and then eventually clean her back.  Paramedics arrived and transported her to the ED.  Upon arrival she was complaining of neck pain and a c-collar was applied but she complained of more neck pain with a collar on.  Patient was seen right away, and she did not have any reported pain, numbness or tingling down the arms or lower back.  She has headache, feeling sleepy, dizziness, and pain in the neck, shoulders and upper back.  She did not have any significant chest pain or trouble breathing.  Pain is rated  5/10.  Vital signs revealed a temp of 97.7, blood pressure 191/84, heart rate of 64, respiratory rate of 18 with 96% oxygen saturation.  Patient had no significant cephalhematoma or external signs of blunt trauma.  She has tenderness over the neck both midline and bilateral as well as tenderness over the upper thoracic spine and scapular region.  She has some mild tenderness over the sternum.  Lungs were clear.  She had normal sensation and motor function to the upper extremity with good strength maintained.  Imaging studies include CT scan of the head which revealed no intracranial hemorrhage, acute infarct but did show some moderate presumed chronic small vessel ischemic change and moderate generalized volume loss.  CT cervical spine reveal no acute fracture or posttraumatic subluxation.  There was moderate to severe bilateral C4-5, C5-6, and C6-7 bilateral neuroforaminal stenosis.  There is some degenerative changes throughout.  CT scan of the chest revealed no acute intrathoracic process demonstrated.  Patient was given oxycodone 10 mg in the ED with improvement in her pain level.  She remained alert and did not have any apparent neurologic changes.  Patient has blunt closed head injury, cervical spine sprain, and contusion to the upper back.  Patient's vital signs remained stable and her blood pressure improved.  I reviewed the results of her CT imaging tonight.  I would like sleep monitoring tonight.  I recommended a recheck in the clinic in the next 3 days.  Continue Tylenol 1000 mg every 6 hours scheduled for 2-3 days.  Add oxycodone 1 tablet every 6 hours as needed for breakthrough pain.  Ice all sore areas for 2 days, and then add heat.  Return to the emergency department at anytime with new or worsening symptoms.           Written after-visit summary and instructions were given at the time of discharge.    Follow up Plan:   Ken Mar MD  [address] [place] [id number]  [phone]    In 3  days  if not improving    [facility] Emergency Department  [address] [place] [address]  [phone]    If symptoms worsen      Discharge Medications: Oxycodone         Disclaimer: This note consists of words and symbols derived from keyboarding and dictation using voice recognition software.  As a result, there may be errors that have gone undetected.  Please consider this when interpreting information found in this note.     Miki Khan MD  02/23/20 0705

## 2020-02-24 NOTE — DISCHARGE INSTRUCTIONS
Fortunately, there were no serious injuries from your fall.  You have a closed head injury, with the possibility of a concussion.  Please see the attached handout.  I recommend sleep monitoring tonight.  Expect to be sore and stiff for the next several days.  Take Tylenol 1000 mg every 6 hours as needed for pain.  Reserve oxycodone for moderate to severe breakthrough pain.  Ice all sore areas frequently.  Follow-up in the clinic in 3-5 days if not improving especially if you are still having headaches or dizziness.  Return to the emergency department at any time if your symptoms worsen.

## 2020-03-02 DIAGNOSIS — I10 ESSENTIAL HYPERTENSION WITH GOAL BLOOD PRESSURE LESS THAN 140/90: ICD-10-CM

## 2020-03-03 RX ORDER — METOPROLOL TARTRATE 25 MG/1
50 TABLET, FILM COATED ORAL 2 TIMES DAILY
Qty: 120 TABLET | Refills: 0 | Status: SHIPPED | OUTPATIENT
Start: 2020-03-03 | End: 2020-03-26

## 2020-03-03 NOTE — TELEPHONE ENCOUNTER
"Routing refill request to provider for review/approval because:  Crystal given x1 and patient did not follow up, please advise  Labs out of range:  Bp    Lopressor  Last Written Prescription Date:  12/18/2019  Last Fill Quantity: 120,  # refills: 0   Last office visit: 7/12/2019 with prescribing provider:  Zaid   Future Office Visit:      Requested Prescriptions   Pending Prescriptions Disp Refills     metoprolol tartrate (LOPRESSOR) 25 MG tablet [Pharmacy Med Name: METOPROLOL TARTRATE 25 MG TAB] 120 tablet 0     Sig: TAKE 2 TABLETS (50 MG) BY MOUTH 2 TIMES DAILY       Beta-Blockers Protocol Failed - 3/2/2020  1:32 PM        Failed - Blood pressure under 140/90 in past 12 months     BP Readings from Last 3 Encounters:   02/23/20 (!) 170/78   07/15/19 141/77   07/13/19 144/74                 Passed - Patient is age 6 or older        Passed - Recent (12 mo) or future (30 days) visit within the authorizing provider's specialty     Patient has had an office visit with the authorizing provider or a provider within the authorizing providers department within the previous 12 mos or has a future within next 30 days. See \"Patient Info\" tab in inbasket, or \"Choose Columns\" in Meds & Orders section of the refill encounter.              Passed - Medication is active on med list        Zohra Everett RN on 3/3/2020 at 10:59 AM    "

## 2020-03-26 DIAGNOSIS — I10 ESSENTIAL HYPERTENSION WITH GOAL BLOOD PRESSURE LESS THAN 140/90: ICD-10-CM

## 2020-03-26 RX ORDER — METOPROLOL TARTRATE 25 MG/1
50 TABLET, FILM COATED ORAL 2 TIMES DAILY
Qty: 120 TABLET | Refills: 0 | Status: SHIPPED | OUTPATIENT
Start: 2020-03-26 | End: 2020-04-24

## 2020-03-26 NOTE — TELEPHONE ENCOUNTER
"Routing refill request to provider for review/approval because:  BP out of range  T'd up 1 month for provider review.    Requested Prescriptions   Pending Prescriptions Disp Refills     metoprolol tartrate (LOPRESSOR) 25 MG tablet [Pharmacy Med Name: METOPROLOL TARTRATE 25 MG TAB] 120 tablet 0     Sig: TAKE 2 TABLETS (50 MG) BY MOUTH 2 TIMES DAILY   Last Written Prescription Date:  3/3/2020  Last Fill Quantity: 120,  # refills: 0   Last office visit: 7/12/2019 with prescribing provider:     Future Office Visit:        Beta-Blockers Protocol Failed - 3/26/2020  8:50 AM        Failed - Blood pressure under 140/90 in past 12 months     BP Readings from Last 3 Encounters:   02/23/20 (!) 170/78   07/15/19 141/77   07/13/19 144/74           Passed - Patient is age 6 or older        Passed - Recent (12 mo) or future (30 days) visit within the authorizing provider's specialty     Patient has had an office visit with the authorizing provider or a provider within the authorizing providers department within the previous 12 mos or has a future within next 30 days. See \"Patient Info\" tab in inbasket, or \"Choose Columns\" in Meds & Orders section of the refill encounter.            Passed - Medication is active on med list         Barbara Colby RN      "

## 2020-04-22 DIAGNOSIS — I10 ESSENTIAL HYPERTENSION WITH GOAL BLOOD PRESSURE LESS THAN 140/90: ICD-10-CM

## 2020-04-23 NOTE — TELEPHONE ENCOUNTER
Please schedule phone visit. BP out of goal range and time to be seen. Then forward to provider to approve refill. Thank you...................JUSTIN Carr

## 2020-04-24 ENCOUNTER — VIRTUAL VISIT (OUTPATIENT)
Dept: INTERNAL MEDICINE | Facility: CLINIC | Age: 80
End: 2020-04-24
Payer: MEDICARE

## 2020-04-24 DIAGNOSIS — I10 ESSENTIAL HYPERTENSION WITH GOAL BLOOD PRESSURE LESS THAN 140/90: ICD-10-CM

## 2020-04-24 PROCEDURE — 99441 ZZC PHYSICIAN TELEPHONE EVALUATION 5-10 MIN: CPT | Performed by: INTERNAL MEDICINE

## 2020-04-24 RX ORDER — TERAZOSIN 5 MG/1
5 CAPSULE ORAL AT BEDTIME
Qty: 30 CAPSULE | Refills: 1 | Status: SHIPPED | OUTPATIENT
Start: 2020-04-24 | End: 2020-05-21

## 2020-04-24 RX ORDER — METOPROLOL TARTRATE 25 MG/1
50 TABLET, FILM COATED ORAL 2 TIMES DAILY
Qty: 120 TABLET | Refills: 0 | Status: SHIPPED | OUTPATIENT
Start: 2020-04-24 | End: 2020-05-21

## 2020-04-24 RX ORDER — LISINOPRIL 20 MG/1
TABLET ORAL
Qty: 180 TABLET | Refills: 1 | Status: SHIPPED | OUTPATIENT
Start: 2020-04-24 | End: 2020-08-21

## 2020-04-24 NOTE — TELEPHONE ENCOUNTER
Left message for call back. See msg. Below. Tried calling daughter to set this appointment up. After appointment is set up please route message to provider to approve medication.   Akila Becker CMA ([facility])

## 2020-04-24 NOTE — PROGRESS NOTES
"Called left a message to call back to set up telephone visit. LIANNE Goldstein  Monik Santiago is a 79 year old female who is being evaluated via a billable telephone visit.    bp 154/62 144/85 today at home  The patient has been notified of following:     \"This telephone visit will be conducted via a call between you and your physician/provider. We have found that certain health care needs can be provided without the need for a physical exam.  This service lets us provide the care you need with a short phone conversation.  If a prescription is necessary we can send it directly to your pharmacy.  If lab work is needed we can place an order for that and you can then stop by our lab to have the test done at a later time.    Telephone visits are billed at different rates depending on your insurance coverage. During this emergency period, for some insurers they may be billed the same as an in-person visit.  Please reach out to your insurance provider with any questions.    If during the course of the call the physician/provider feels a telephone visit is not appropriate, you will not be charged for this service.\"    Patient has given verbal consent for Telephone visit?  Yes    How would you like to obtain your AVS? E-Mail (inform patient AVS not encrypted)    Subjective     Monik Santiago is a 79 year old female who presents to clinic today for the following health issues:    HPI  Hypertension Follow-up      Do you check your blood pressure regularly outside of the clinic? Yes     Are you following a low salt diet? No    Are your blood pressures ever more than 140 on the top number (systolic) OR more   than 90 on the bottom number (diastolic), for example 140/90? Yes      How many servings of fruits and vegetables do you eat daily?  2-3    On average, how many sweetened beverages do you drink each day (Examples: soda, juice, sweet tea, etc.  Do NOT count diet or artificially sweetened beverages)?   0    How many days " per week do you exercise enough to make your heart beat faster? 4    How many minutes a day do you exercise enough to make your heart beat faster? 10 - 19    How many days per week do you miss taking your medication? 0    Feeling good, doing ok.   BP has been high today, 161/67. Pulse 64      No chest pains, no headaches.  Chronic neck pains with arthritis.     Left leg has some swelling and purple at times.     Patient Active Problem List   Diagnosis     Other alteration of consciousness     Generalized anxiety disorder     GERD (gastroesophageal reflux disease)     Osteoarthritis     Hyperlipidemia LDL goal <70     Hypertension goal BP (blood pressure) < 140/90     Advanced directives, counseling/discussion     Acute myocardial infarction (H)     Hypoxia     NSTEMI (non-ST elevated myocardial infarction), history     Chest pain, atypical     ASCVD (arteriosclerotic cardiovascular disease)     CKD (chronic kidney disease) stage 3, GFR 30-59 ml/min (H)     Acute worsening of stage 3 chronic kidney disease (H)     Systolic CHF, chronic (H)     Anemia     DVT prophylaxis     Lichen sclerosus et atrophicus of the vulva     Near syncope     Coronary atherosclerosis of native coronary artery (VESSEL)     Abdominal pain, unspecified abdominal location     Health Care Home     Syncope     Pseudoseizure     Acidosis-metabolic     Nausea without vomiting     Recent repair of hiatal hernia 1/30/15     Urine retention - singh placed 2/4/15     Bilateral leg edema     Old myocardial infarction 2012     Lightheadedness     Lightheaded     Altered mental status     Headache     History of C.diff colitis     Anxiety     History of MRI of brain and brain stem     RUQ abdominal pain     S/P laparoscopic cholecystectomy     Chest pain     Past Surgical History:   Procedure Laterality Date     C NONSPECIFIC PROCEDURE      abd hernia repair     C TOTAL KNEE ARTHROPLASTY  08/23/10    Right knee     Cardiac stents       COLONOSCOPY N/A  2/17/2016    Procedure: COMBINED COLONOSCOPY, SINGLE OR MULTIPLE BIOPSY/POLYPECTOMY BY BIOPSY;  Surgeon: Jose Gan MD;  Location: [facility]     ESOPHAGOSCOPY, GASTROSCOPY, DUODENOSCOPY (EGD), COMBINED N/A 12/17/2014    Procedure: COMBINED ESOPHAGOSCOPY, GASTROSCOPY, DUODENOSCOPY (EGD);  Surgeon: Kaz Mccoy MD;  Location: [facility]     HC REMV CATARACT EXTRACAP,INSERT LENS,COMP      darrian     HC UGI ENDOSCOPY DIAG W BIOPSY  12/16/09     HC YAG LASER CAPSULOTOMY  9/17/2009    Right eye     HEART CATH, ANGIOPLASTY  10/03/2012    Stent of LAD     HEART CATH, ANGIOPLASTY  05/17/2014     CAD, patent stent of LAD     LAPAROSCOPIC CHOLECYSTECTOMY N/A 12/3/2016    Procedure: LAPAROSCOPIC CHOLECYSTECTOMY;  Surgeon: Viral Meyers MD;  Location: [facility]     LAPAROSCOPIC HERNIORRHAPHY HIATAL N/A 1/30/2015    Procedure: LAPAROSCOPIC HERNIORRHAPHY HIATAL;  Surgeon: Chase Camara MD;  Location: [facility]     LAPAROSCOPIC NISSEN FUNDOPLICATION N/A 1/30/2015    Procedure: LAPAROSCOPIC NISSEN FUNDOPLICATION;  Surgeon: Chase Camara MD;  Location: [facility]     MOHS MICROGRAPHIC PROCEDURE  09/14/11    left upper forehead-09/14/11       Social History     Tobacco Use     Smoking status: Never Smoker     Smokeless tobacco: Never Used   Substance Use Topics     Alcohol use: No     Alcohol/week: 0.0 standard drinks     Family History   Problem Relation Age of Onset     Cardiovascular Father      Cardiovascular Brother      Cardiovascular Mother      Hypertension Mother      Lipids Mother      Cardiovascular Sister         stents x 2-3     Hypertension Sister      Cardiovascular Sister         MI 64     Diabetes No family hx of          Current Outpatient Medications   Medication Sig Dispense Refill     ACETAMINOPHEN PO Take 500-1,000 mg by mouth every 8 hours as needed for pain       ASPIRIN PO Take 81 mg by mouth daily       atorvastatin (LIPITOR) 40 MG tablet Take 1 tablet (40 mg) by mouth daily 90 tablet 3      DULoxetine (CYMBALTA) 20 MG capsule Take 1 capsule (20 mg) by mouth 2 times daily 180 capsule 3     DULoxetine (CYMBALTA) 20 MG capsule TAKE 1 CAPSULE BY MOUTH TWICE A  capsule 0     estradiol (ESTRACE VAGINAL) 0.1 MG/GM cream Place 2 g vaginally twice a week 42.5 g 3     FUROSEMIDE 20 MG PO tablet TAKE 1 TABLET BY MOUTH EVERY DAY 90 tablet 2     halobetasol (ULTRAVATE) 0.05 % ointment Apply topically three times a week 1 Tube 3     lisinopril (PRINIVIL/ZESTRIL) 20 MG tablet TAKE 1 TABLET (20 MG) BY MOUTH 2 TIMES DAILY 180 tablet 1     loperamide (IMODIUM) 2 MG capsule Take 2 mg by mouth 4 times daily as needed for diarrhea       Lysine 500 MG TABS Take 1 tablet by mouth daily as needed        Multiple Vitamins-Minerals (MULTIVITAMIN ADULT) CHEW Take 2 chew tab by mouth daily       nitroGLYcerin (NITROSTAT) 0.4 MG sublingual tablet TAKE EVERY 5 MINUTES BY MOUTH X 3 DOSES, IF NOT EFFECTIVE CALL MD 25 tablet 3     oxyCODONE (ROXICODONE) 5 MG tablet Take 1 tablet (5 mg) by mouth every 6 hours as needed for breakthrough pain 12 tablet 0     Probiotic Product (PROBIOTIC DAILY PO) Take 1 capsule by mouth daily       metoprolol tartrate (LOPRESSOR) 25 MG tablet TAKE 2 TABLETS (50 MG) BY MOUTH 2 TIMES DAILY 120 tablet 0       Reviewed and updated as needed this visit by Provider         Review of Systems   ROS COMP: Constitutional, HEENT, cardiovascular, pulmonary, gi and gu systems are negative, except as otherwise noted.       Objective   Reported vitals:  There were no vitals taken for this visit.   healthy, alert and no distress  PSYCH: Alert and oriented times 3; coherent speech, normal   rate and volume, able to articulate logical thoughts, able   to abstract reason, no tangential thoughts, no hallucinations   or delusions  Her affect is normal  RESP: No cough, no audible wheezing, able to talk in full sentences  Remainder of exam unable to be completed due to telephone visits    Diagnostic Test Results:  Labs  reviewed in Epic        Assessment/Plan:  1. Essential hypertension with goal blood pressure less than 140/90  Patient is doing okay but getting high readings.  We will refill her lisinopril for 20 mg twice a day working to add terra Zosyn 5 mg a day.  She will follow her blood pressure and see us back I unless she is having other issues.  - lisinopril (ZESTRIL) 20 MG tablet; 20mg twice a day  Dispense: 180 tablet; Refill: 1  - terazosin (HYTRIN) 5 MG capsule; Take 1 capsule (5 mg) by mouth At Bedtime  Dispense: 30 capsule; Refill: 1    No follow-ups on file.      Phone call duration:  8 minutes    Ken Mar MD

## 2020-04-24 NOTE — TELEPHONE ENCOUNTER
An appointment has been scheduled for today with Dr. Briceño.   Thank you,  Nighat Giron   for [facility]

## 2020-05-20 DIAGNOSIS — I10 ESSENTIAL HYPERTENSION WITH GOAL BLOOD PRESSURE LESS THAN 140/90: ICD-10-CM

## 2020-05-21 RX ORDER — TERAZOSIN 5 MG/1
5 CAPSULE ORAL AT BEDTIME
Qty: 30 CAPSULE | Refills: 1 | Status: SHIPPED | OUTPATIENT
Start: 2020-05-21 | End: 2020-06-15

## 2020-05-21 RX ORDER — METOPROLOL TARTRATE 25 MG/1
50 TABLET, FILM COATED ORAL 2 TIMES DAILY
Qty: 120 TABLET | Refills: 0 | Status: SHIPPED | OUTPATIENT
Start: 2020-05-21 | End: 2020-06-15

## 2020-05-21 NOTE — TELEPHONE ENCOUNTER
Patient has been seen in the past 30 days. ( 4/24/20)   Routing Metoprolol  to PCP to advise.  JUSTIN Carr

## 2020-05-21 NOTE — TELEPHONE ENCOUNTER
"Hytrin  Last Written Prescription Date:  04/24/2020  Last Fill Quantity: 30,  # refills: 1   Last office visit: 04/24/2020 with prescribing provider:  Zaid   Future Office Visit:   Next 5 appointments (look out 90 days)    Jul 10, 2020 10:30 AM CDT  Office Visit with Ken Mar MD  [facility] [address] [place] [id number] [phone]      Routing refill request to provider for review/approval because:  Elevated Blood pressures.     Requested Prescriptions   Pending Prescriptions Disp Refills     terazosin (HYTRIN) 5 MG capsule [Pharmacy Med Name: TERAZOSIN 5 MG CAPSULE] 30 capsule 1     Sig: TAKE 1 CAPSULE (5 MG) BY MOUTH AT BEDTIME       Alpha Blockers Failed - 5/20/2020  1:35 PM        Failed - Blood pressure under 140/90 in past 12 months     BP Readings from Last 3 Encounters:   02/23/20 (!) 170/78   07/15/19 141/77   07/13/19 144/74           Passed - Recent (12 mo) or future (30 days) visit within the authorizing provider's specialty     Patient has had an office visit with the authorizing provider or a provider within the authorizing providers department within the previous 12 mos or has a future within next 30 days. See \"Patient Info\" tab in inbasket, or \"Choose Columns\" in Meds & Orders section of the refill encounter.          Passed - Patient does not have Tadalafil, Vardenafil, or Sildenafil on their medication list        Passed - Medication is active on med list        Passed - Patient is 18 years of age or older        Passed - No active pregnancy on record        Passed - No positive pregnancy test in past 12 months       Antiadrenergic Antihypertensives Failed - 5/20/2020  1:35 PM        Failed - Blood pressure less than 140/90 in past 6 months     BP Readings from Last 3 Encounters:   02/23/20 (!) 170/78   07/15/19 141/77 07/13/19 144/74           Passed - Medication is active on med list        Passed - Patient is age 18 or " "older        Passed - No active pregnancy on record        Passed - Normal serum creatinine on file in past 12 months     Recent Labs   Lab Test 07/13/19  1823   CR 0.89     Ok to refill medication if creatinine is low        Passed - No positive pregnancy test within past 12 months        Passed - Recent (6 mo) or future (30 days) visit within the authorizing provider's specialty     Patient had office visit in the last 6 months or has a visit in the next 30 days with authorizing provider or within the authorizing provider's specialty.  See \"Patient Info\" tab in inbasket, or \"Choose Columns\" in Meds & Orders section of the refill encounter.           Lesly Shahid RN   "

## 2020-06-12 DIAGNOSIS — I10 ESSENTIAL HYPERTENSION WITH GOAL BLOOD PRESSURE LESS THAN 140/90: ICD-10-CM

## 2020-06-15 RX ORDER — METOPROLOL TARTRATE 25 MG/1
50 TABLET, FILM COATED ORAL 2 TIMES DAILY
Qty: 120 TABLET | Refills: 0 | Status: SHIPPED | OUTPATIENT
Start: 2020-06-15 | End: 2020-07-13

## 2020-06-15 RX ORDER — TERAZOSIN 5 MG/1
5 CAPSULE ORAL AT BEDTIME
Qty: 30 CAPSULE | Refills: 1 | Status: SHIPPED | OUTPATIENT
Start: 2020-06-15 | End: 2020-07-13

## 2020-06-15 NOTE — TELEPHONE ENCOUNTER
Pending Prescriptions:                       Disp   Refills    metoprolol tartrate (LOPRESSOR) 25 MG tabl*120 ta*0        Sig: TAKE 2 TABLETS (50 MG) BY MOUTH 2 TIMES DAILY    terazosin (HYTRIN) 5 MG capsule [Pharmacy *30 cap*1        Sig: TAKE 1 CAPSULE (5 MG) BY MOUTH AT BEDTIME    Routing refill request to provider for review/approval because:  Labs out of range:    BP Readings from Last 3 Encounters:   02/23/20 (!) 170/78   07/15/19 141/77   07/13/19 144/74     Jared Hammer, RN, BSN

## 2020-07-11 DIAGNOSIS — I10 ESSENTIAL HYPERTENSION WITH GOAL BLOOD PRESSURE LESS THAN 140/90: ICD-10-CM

## 2020-07-13 RX ORDER — METOPROLOL TARTRATE 25 MG/1
50 TABLET, FILM COATED ORAL 2 TIMES DAILY
Qty: 120 TABLET | Refills: 0 | Status: SHIPPED | OUTPATIENT
Start: 2020-07-13 | End: 2020-07-15

## 2020-07-13 RX ORDER — TERAZOSIN 5 MG/1
5 CAPSULE ORAL AT BEDTIME
Qty: 30 CAPSULE | Refills: 1 | Status: SHIPPED | OUTPATIENT
Start: 2020-07-13 | End: 2020-07-20

## 2020-07-13 NOTE — TELEPHONE ENCOUNTER
"HYTRIN  Last Written Prescription Date:  6/15/20  Last Fill Quantity: 30,  # refills: 1   Last office visit: 7/12/2019 with prescribing provider:  Dr. Mar   Future Office Visit:  NONE    METOPROLOL  Last Written Prescription Date:  6/15/20  Last Fill Quantity: 120,  # refills: 0   Last office visit: 7/12/2019 with prescribing provider:  Dr. Mar   Future Office Visit:  NONE  ,  Requested Prescriptions   Pending Prescriptions Disp Refills     terazosin (HYTRIN) 5 MG capsule [Pharmacy Med Name: TERAZOSIN 5 MG CAPSULE] 30 capsule 1     Sig: TAKE 1 CAPSULE (5 MG) BY MOUTH AT BEDTIME       Alpha Blockers Failed - 7/11/2020  9:39 AM        Failed - Blood pressure under 140/90 in past 12 months     BP Readings from Last 3 Encounters:   02/23/20 (!) 170/78   07/15/19 141/77   07/13/19 144/74                 Passed - Recent (12 mo) or future (30 days) visit within the authorizing provider's specialty     Patient has had an office visit with the authorizing provider or a provider within the authorizing providers department within the previous 12 mos or has a future within next 30 days. See \"Patient Info\" tab in inbasket, or \"Choose Columns\" in Meds & Orders section of the refill encounter.              Passed - Patient does not have Tadalafil, Vardenafil, or Sildenafil on their medication list        Passed - Medication is active on med list        Passed - Patient is 18 years of age or older        Passed - No active pregnancy on record        Passed - No positive pregnancy test in past 12 months       Antiadrenergic Antihypertensives Failed - 7/11/2020  9:39 AM        Failed - Blood pressure less than 140/90 in past 6 months     BP Readings from Last 3 Encounters:   02/23/20 (!) 170/78   07/15/19 141/77   07/13/19 144/74           Passed - Medication is active on med list        Passed - Patient is age 18 or older        Passed - No active pregnancy on record        Passed - Normal serum creatinine on file in past 12 " "months     Recent Labs   Lab Test 07/13/19  1823   CR 0.89   Ok to refill medication if creatinine is low          Passed - No positive pregnancy test within past 12 months        Passed - Recent (6 mo) or future (30 days) visit within the authorizing provider's specialty     Patient had office visit in the last 6 months or has a visit in the next 30 days with authorizing provider or within the authorizing provider's specialty.  See \"Patient Info\" tab in inbasket, or \"Choose Columns\" in Meds & Orders section of the refill encounter.           metoprolol tartrate (LOPRESSOR) 25 MG tablet [Pharmacy Med Name: METOPROLOL TARTRATE 25 MG TAB] 120 tablet 0     Sig: TAKE 2 TABLETS (50 MG) BY MOUTH 2 TIMES DAILY       Beta-Blockers Protocol Failed - 7/11/2020  9:39 AM        Failed - Blood pressure under 140/90 in past 12 months     BP Readings from Last 3 Encounters:   02/23/20 (!) 170/78   07/15/19 141/77   07/13/19 144/74           Passed - Patient is age 6 or older        Passed - Recent (12 mo) or future (30 days) visit within the authorizing provider's specialty     Patient has had an office visit with the authorizing provider or a provider within the authorizing providers department within the previous 12 mos or has a future within next 30 days. See \"Patient Info\" tab in inbasket, or \"Choose Columns\" in Meds & Orders section of the refill encounter.              Passed - Medication is active on med list           Routing refill request to provider for review/approval because:  Patient needs to be seen because:  BP is elevated.  JUSTIN Carr                          "

## 2020-07-15 DIAGNOSIS — I10 ESSENTIAL HYPERTENSION WITH GOAL BLOOD PRESSURE LESS THAN 140/90: ICD-10-CM

## 2020-07-15 RX ORDER — METOPROLOL TARTRATE 25 MG/1
50 TABLET, FILM COATED ORAL 2 TIMES DAILY
Qty: 120 TABLET | Refills: 0 | Status: SHIPPED | OUTPATIENT
Start: 2020-07-15 | End: 2020-07-20

## 2020-07-20 ENCOUNTER — VIRTUAL VISIT (OUTPATIENT)
Dept: INTERNAL MEDICINE | Facility: CLINIC | Age: 80
End: 2020-07-20
Payer: MEDICARE

## 2020-07-20 VITALS — DIASTOLIC BLOOD PRESSURE: 60 MMHG | SYSTOLIC BLOOD PRESSURE: 127 MMHG

## 2020-07-20 DIAGNOSIS — E78.5 HYPERLIPIDEMIA LDL GOAL <70: ICD-10-CM

## 2020-07-20 DIAGNOSIS — I10 ESSENTIAL HYPERTENSION WITH GOAL BLOOD PRESSURE LESS THAN 140/90: Primary | ICD-10-CM

## 2020-07-20 DIAGNOSIS — F43.23 ADJUSTMENT DISORDER WITH MIXED ANXIETY AND DEPRESSED MOOD: ICD-10-CM

## 2020-07-20 DIAGNOSIS — I25.10 ASCVD (ARTERIOSCLEROTIC CARDIOVASCULAR DISEASE): ICD-10-CM

## 2020-07-20 PROCEDURE — 99442 ZZC PHYSICIAN TELEPHONE EVALUATION 11-20 MIN: CPT | Performed by: INTERNAL MEDICINE

## 2020-07-20 RX ORDER — TERAZOSIN 5 MG/1
5 CAPSULE ORAL AT BEDTIME
Qty: 90 CAPSULE | Refills: 3 | Status: SHIPPED | OUTPATIENT
Start: 2020-07-20

## 2020-07-20 RX ORDER — METOPROLOL TARTRATE 25 MG/1
50 TABLET, FILM COATED ORAL 2 TIMES DAILY
Qty: 180 TABLET | Refills: 3 | Status: SHIPPED | OUTPATIENT
Start: 2020-07-20 | End: 2020-12-09

## 2020-07-20 RX ORDER — DULOXETIN HYDROCHLORIDE 20 MG/1
CAPSULE, DELAYED RELEASE ORAL
Qty: 180 CAPSULE | Refills: 3 | Status: SHIPPED | OUTPATIENT
Start: 2020-07-20

## 2020-07-20 RX ORDER — ATORVASTATIN CALCIUM 40 MG/1
40 TABLET, FILM COATED ORAL DAILY
Qty: 90 TABLET | Refills: 3 | Status: SHIPPED | OUTPATIENT
Start: 2020-07-20

## 2020-07-20 ASSESSMENT — ANXIETY QUESTIONNAIRES
6. BECOMING EASILY ANNOYED OR IRRITABLE: NOT AT ALL
5. BEING SO RESTLESS THAT IT IS HARD TO SIT STILL: NOT AT ALL
2. NOT BEING ABLE TO STOP OR CONTROL WORRYING: NOT AT ALL
1. FEELING NERVOUS, ANXIOUS, OR ON EDGE: SEVERAL DAYS
GAD7 TOTAL SCORE: 1
IF YOU CHECKED OFF ANY PROBLEMS ON THIS QUESTIONNAIRE, HOW DIFFICULT HAVE THESE PROBLEMS MADE IT FOR YOU TO DO YOUR WORK, TAKE CARE OF THINGS AT HOME, OR GET ALONG WITH OTHER PEOPLE: NOT DIFFICULT AT ALL
3. WORRYING TOO MUCH ABOUT DIFFERENT THINGS: NOT AT ALL
7. FEELING AFRAID AS IF SOMETHING AWFUL MIGHT HAPPEN: NOT AT ALL

## 2020-07-20 ASSESSMENT — PATIENT HEALTH QUESTIONNAIRE - PHQ9
5. POOR APPETITE OR OVEREATING: NOT AT ALL
SUM OF ALL RESPONSES TO PHQ QUESTIONS 1-9: 2

## 2020-07-20 NOTE — PROGRESS NOTES
"Monik Santiago is a 80 year old female who is being evaluated via a billable telephone visit.      The patient has been notified of following:     \"This telephone visit will be conducted via a call between you and your physician/provider. We have found that certain health care needs can be provided without the need for a physical exam.  This service lets us provide the care you need with a short phone conversation.  If a prescription is necessary we can send it directly to your pharmacy.  If lab work is needed we can place an order for that and you can then stop by our lab to have the test done at a later time.    Telephone visits are billed at different rates depending on your insurance coverage. During this emergency period, for some insurers they may be billed the same as an in-person visit.  Please reach out to your insurance provider with any questions.    If during the course of the call the physician/provider feels a telephone visit is not appropriate, you will not be charged for this service.\"    Patient has given verbal consent for Telephone visit?  Yes    What phone number would you like to be contacted at? [phone]    How would you like to obtain your AVS? Mail a copy    Subjective     Monik Santiago is a 80 year old female who presents via phone visit today for the following health issues:    HPI    Hyperlipidemia Follow-Up      Are you regularly taking any medication or supplement to lower your cholesterol?   Yes- Lipitor    Are you having muscle aches or other side effects that you think could be caused by your cholesterol lowering medication?  No    Hypertension Follow-up      Do you check your blood pressure regularly outside of the clinic? Yes     Are you following a low salt diet? Yes    Are your blood pressures ever more than 140 on the top number (systolic) OR more   than 90 on the bottom number (diastolic), for example 140/90? Yes      How many servings of fruits and vegetables do you eat " daily?  2-3    On average, how many sweetened beverages do you drink each day (Examples: soda, juice, sweet tea, etc.  Do NOT count diet or artificially sweetened beverages)?   0    How many days per week do you exercise enough to make your heart beat faster? 3 or less    How many minutes a day do you exercise enough to make your heart beat faster? 9 or less    How many days per week do you miss taking your medication? 0        Depression and Anxiety Follow-Up    How are you doing with your depression since your last visit? Improved     How are you doing with your anxiety since your last visit?  No change    Are you having other symptoms that might be associated with depression or anxiety? No    Have you had a significant life event? Relationship Concerns, daughter causes her stress at times    Do you have any concerns with your use of alcohol or other drugs? No    Social History     Tobacco Use     Smoking status: Never Smoker     Smokeless tobacco: Never Used   Substance Use Topics     Alcohol use: No     Alcohol/week: 0.0 standard drinks     Drug use: No     PHQ 2/13/2017 7/12/2019   PHQ-9 Total Score 13 1   Q9: Thoughts of better off dead/self-harm past 2 weeks Not at all Not at all     MIGUEL ANGEL-7 SCORE 2/13/2017   Total Score 5     Last PHQ-9 7/20/2020   1.  Little interest or pleasure in doing things 0   2.  Feeling down, depressed, or hopeless 0   3.  Trouble falling or staying asleep, or sleeping too much 0   4.  Feeling tired or having little energy 1   5.  Poor appetite or overeating 0   6.  Feeling bad about yourself 1   7.  Trouble concentrating 0   8.  Moving slowly or restless 0   Q9: Thoughts of better off dead/self-harm past 2 weeks 0   PHQ-9 Total Score 2   Difficulty at work, home, or with people -     MIGUEL ANGEL-7  7/20/2020   1. Feeling nervous, anxious, or on edge 1   2. Not being able to stop or control worrying 0   3. Worrying too much about different things 0   4. Trouble relaxing 0   5. Being so  restless that it is hard to sit still 0   6. Becoming easily annoyed or irritable 0   7. Feeling afraid, as if something awful might happen 0   MIGUEL ANGEL-7 Total Score 1   If you checked any problems, how difficult have they made it for you to do your work, take care of things at home, or get along with other people? Not difficult at all     She is doing ok.  Taking her medications     Daughter thinks she doesn't eat right or exercise. Walks her dog a few times a day, uses her cane.     BP has been good, 127/60, takes the terazosin and lisinopril twice a day     No chest pains, no sob. No headaches, no visual changes.     Diet she drinks ensure, water, milk. Cereal each morning.      Does her own laundry and cooks a little bit.       Patient Active Problem List   Diagnosis     Other alteration of consciousness     Generalized anxiety disorder     GERD (gastroesophageal reflux disease)     Osteoarthritis     Hyperlipidemia LDL goal <70     Hypertension goal BP (blood pressure) < 140/90     Advanced directives, counseling/discussion     Acute myocardial infarction (H)     Hypoxia     NSTEMI (non-ST elevated myocardial infarction), history     Chest pain, atypical     ASCVD (arteriosclerotic cardiovascular disease)     CKD (chronic kidney disease) stage 3, GFR 30-59 ml/min (H)     Acute worsening of stage 3 chronic kidney disease (H)     Systolic CHF, chronic (H)     Anemia     DVT prophylaxis     Lichen sclerosus et atrophicus of the vulva     Near syncope     Coronary atherosclerosis of native coronary artery (VESSEL)     Abdominal pain, unspecified abdominal location     Health Care Home     Syncope     Pseudoseizure     Acidosis-metabolic     Nausea without vomiting     Recent repair of hiatal hernia 1/30/15     Urine retention - singh placed 2/4/15     Bilateral leg edema     Old myocardial infarction 2012     Lightheadedness     Lightheaded     Altered mental status     Headache     History of C.diff colitis      Anxiety     History of MRI of brain and brain stem     RUQ abdominal pain     S/P laparoscopic cholecystectomy     Chest pain     Past Surgical History:   Procedure Laterality Date     C NONSPECIFIC PROCEDURE      abd hernia repair     C TOTAL KNEE ARTHROPLASTY  08/23/10    Right knee     Cardiac stents       COLONOSCOPY N/A 2/17/2016    Procedure: COMBINED COLONOSCOPY, SINGLE OR MULTIPLE BIOPSY/POLYPECTOMY BY BIOPSY;  Surgeon: Jose Gan MD;  Location: [facility]     ESOPHAGOSCOPY, GASTROSCOPY, DUODENOSCOPY (EGD), COMBINED N/A 12/17/2014    Procedure: COMBINED ESOPHAGOSCOPY, GASTROSCOPY, DUODENOSCOPY (EGD);  Surgeon: Kaz Mccoy MD;  Location: [facility]     HC REMV CATARACT EXTRACAP,INSERT LENS, COMPLEX, W/O ECP      darrian     HC UGI ENDOSCOPY DIAG W BIOPSY  12/16/09     HC YAG LASER CAPSULOTOMY  9/17/2009    Right eye     HEART CATH, ANGIOPLASTY  10/03/2012    Stent of LAD     HEART CATH, ANGIOPLASTY  05/17/2014     CAD, patent stent of LAD     LAPAROSCOPIC CHOLECYSTECTOMY N/A 12/3/2016    Procedure: LAPAROSCOPIC CHOLECYSTECTOMY;  Surgeon: Viral Meyers MD;  Location: [facility]     LAPAROSCOPIC HERNIORRHAPHY HIATAL N/A 1/30/2015    Procedure: LAPAROSCOPIC HERNIORRHAPHY HIATAL;  Surgeon: Chase Camara MD;  Location: [facility]     LAPAROSCOPIC NISSEN FUNDOPLICATION N/A 1/30/2015    Procedure: LAPAROSCOPIC NISSEN FUNDOPLICATION;  Surgeon: Chase Camara MD;  Location: [facility]     MOHS MICROGRAPHIC PROCEDURE  09/14/11    left upper forehead-09/14/11       Social History     Tobacco Use     Smoking status: Never Smoker     Smokeless tobacco: Never Used   Substance Use Topics     Alcohol use: No     Alcohol/week: 0.0 standard drinks     Family History   Problem Relation Age of Onset     Cardiovascular Father      Cardiovascular Brother      Cardiovascular Mother      Hypertension Mother      Lipids Mother      Cardiovascular Sister         stents x 2-3     Hypertension Sister      Cardiovascular  Sister         MI 64     Diabetes No family hx of          Current Outpatient Medications   Medication Sig Dispense Refill     ACETAMINOPHEN PO Take 500-1,000 mg by mouth every 8 hours as needed for pain       ASPIRIN PO Take 81 mg by mouth daily       atorvastatin (LIPITOR) 40 MG tablet Take 1 tablet (40 mg) by mouth daily 90 tablet 3     DULoxetine (CYMBALTA) 20 MG capsule TAKE 1 CAPSULE BY MOUTH TWICE A  capsule 0     estradiol (ESTRACE VAGINAL) 0.1 MG/GM cream Place 2 g vaginally twice a week 42.5 g 3     FUROSEMIDE 20 MG PO tablet TAKE 1 TABLET BY MOUTH EVERY DAY 90 tablet 2     halobetasol (ULTRAVATE) 0.05 % ointment Apply topically three times a week 1 Tube 3     lisinopril (ZESTRIL) 20 MG tablet 20mg twice a day 180 tablet 1     loperamide (IMODIUM) 2 MG capsule Take 2 mg by mouth 4 times daily as needed for diarrhea       Lysine 500 MG TABS Take 1 tablet by mouth daily as needed        metoprolol tartrate (LOPRESSOR) 25 MG tablet TAKE 2 TABLETS (50 MG) BY MOUTH 2 TIMES DAILY 120 tablet 0     Multiple Vitamins-Minerals (MULTIVITAMIN ADULT) CHEW Take 2 chew tab by mouth daily       oxyCODONE (ROXICODONE) 5 MG tablet Take 1 tablet (5 mg) by mouth every 6 hours as needed for breakthrough pain 12 tablet 0     Probiotic Product (PROBIOTIC DAILY PO) Take 1 capsule by mouth daily       terazosin (HYTRIN) 5 MG capsule TAKE 1 CAPSULE (5 MG) BY MOUTH AT BEDTIME 30 capsule 1     DULoxetine (CYMBALTA) 20 MG capsule Take 1 capsule (20 mg) by mouth 2 times daily (Patient not taking: Reported on 7/20/2020) 180 capsule 3     nitroGLYcerin (NITROSTAT) 0.4 MG sublingual tablet TAKE EVERY 5 MINUTES BY MOUTH X 3 DOSES, IF NOT EFFECTIVE CALL MD (Patient not taking: Reported on 7/20/2020) 25 tablet 3     Allergies   Allergen Reactions     Codeine Fatigue     Latex      Lidocaine Other (See Comments)     was one of 3 drugs given during GA that caused difficulty with anesthesia reversal     Sulfa Drugs Hives     Trimethoprim  Hives     Valium Fatigue       Reviewed and updated as needed this visit by Provider      Review of Systems   Constitutional, HEENT, cardiovascular, pulmonary, gi and gu systems are negative, except as otherwise noted.        Objective   Reported vitals:  There were no vitals taken for this visit.   healthy, alert and no distress  PSYCH: Alert and oriented times 3; coherent speech, normal   rate and volume, able to articulate logical thoughts, able   to abstract reason, no tangential thoughts, no hallucinations   or delusions  Her affect is normal  RESP: No cough, no audible wheezing, able to talk in full sentences  Remainder of exam unable to be completed due to telephone visits    Diagnostic Test Results:  Labs reviewed in Epic        Assessment/Plan:    1. ASCVD (arteriosclerotic cardiovascular disease)  Stable no chest pains, continue statin therapy  - atorvastatin (LIPITOR) 40 MG tablet; Take 1 tablet (40 mg) by mouth daily  Dispense: 90 tablet; Refill: 3    2. Hyperlipidemia LDL goal <70  Continue statin, due for fasting labs will wait to do labs for a few months.   - atorvastatin (LIPITOR) 40 MG tablet; Take 1 tablet (40 mg) by mouth daily  Dispense: 90 tablet; Refill: 3    3. Adjustment disorder with mixed anxiety and depressed mood  Treating her depression, sounds good.   - DULoxetine (CYMBALTA) 20 MG capsule; TAKE 1 CAPSULE BY MOUTH TWICE A DAY  Dispense: 180 capsule; Refill: 3    4. Essential hypertension with goal blood pressure less than 140/90  BP is much better, continue lopressur and hytrin  Refills are done.   - metoprolol tartrate (LOPRESSOR) 25 MG tablet; Take 2 tablets (50 mg) by mouth 2 times daily  Dispense: 180 tablet; Refill: 3  - terazosin (HYTRIN) 5 MG capsule; Take 1 capsule (5 mg) by mouth At Bedtime  Dispense: 90 capsule; Refill: 3    No follow-ups on file.      Phone call duration:  12 minutes    Ken Mar MD

## 2020-07-21 ASSESSMENT — ANXIETY QUESTIONNAIRES: GAD7 TOTAL SCORE: 1

## 2020-08-21 DIAGNOSIS — I10 ESSENTIAL HYPERTENSION WITH GOAL BLOOD PRESSURE LESS THAN 140/90: ICD-10-CM

## 2020-08-21 RX ORDER — LISINOPRIL 20 MG/1
TABLET ORAL
Qty: 180 TABLET | Refills: 1 | Status: SHIPPED | OUTPATIENT
Start: 2020-08-21 | End: 2021-04-13

## 2020-08-21 NOTE — TELEPHONE ENCOUNTER
Pending Prescriptions:                       Disp   Refills    lisinopril (ZESTRIL) 20 MG tablet [Pharmac*180 ta*1        Sig: TAKE 1 TABLET BY MOUTH TWICE A DAY    Last Written Prescription Date:  4/24/20  Last Fill Quantity: 180,  # refills: 1   Last office visit: 7/20/20 Zaid   Future Office Visit:        Routing refill request to provider for review/approval because:  Labs not current:  K, faustino Kendrick RN on 8/21/2020 at 10:39 AM

## 2020-09-02 ENCOUNTER — TELEPHONE (OUTPATIENT)
Dept: INTERNAL MEDICINE | Facility: CLINIC | Age: 80
End: 2020-09-02

## 2020-09-02 NOTE — TELEPHONE ENCOUNTER
"Reason for Call:  Other regarding move     Detailed comments: Monik is moving on 9/11/20, Scarlet would like to know if there is anything Dr Mar needs to have updated in regards to her health    Phone Number Patient can be reached at: [phone]    Best Time: ***    Can we leave a detailed message on this number? { [phone]::\"YES\"}    Call taken on 9/2/2020 at 11:29 AM by Jacquelin Gonzales      "

## 2020-09-08 DIAGNOSIS — M79.89 SWELLING OF LOWER LIMB: ICD-10-CM

## 2020-09-09 RX ORDER — FUROSEMIDE 20 MG
TABLET ORAL
Qty: 90 TABLET | Refills: 2 | OUTPATIENT
Start: 2020-09-09

## 2020-09-09 NOTE — TELEPHONE ENCOUNTER
"Denied  Refills current at this pharmacy    Requested Prescriptions   Pending Prescriptions Disp Refills     furosemide (LASIX) 20 MG tablet [Pharmacy Med Name: FUROSEMIDE 20 MG TABLET] 90 tablet 2     Sig: TAKE 1 TABLET BY MOUTH EVERY DAY   Last Written Prescription Date:  2/20/2020  Last Fill Quantity: 90,  # refills: 2   Last office visit: 7/20/2020 with prescribing provider:     Future Office Visit:        Diuretics (Including Combos) Protocol Failed - 9/8/2020 11:03 AM        Failed - Normal serum creatinine on file in past 12 months     Recent Labs   Lab Test 07/13/19  1823   CR 0.89            Failed - Normal serum potassium on file in past 12 months     Recent Labs   Lab Test 07/13/19  1823   POTASSIUM 3.7            Failed - Normal serum sodium on file in past 12 months     Recent Labs   Lab Test 07/13/19  1823   *            Passed - Blood pressure under 140/90 in past 12 months     BP Readings from Last 3 Encounters:   07/20/20 127/60   02/23/20 (!) 170/78   07/15/19 141/77           Passed - Recent (12 mo) or future (30 days) visit within the authorizing provider's specialty     Patient has had an office visit with the authorizing provider or a provider within the authorizing providers department within the previous 12 mos or has a future within next 30 days. See \"Patient Info\" tab in inbasket, or \"Choose Columns\" in Meds & Orders section of the refill encounter.            Passed - Medication is active on med list        Passed - Patient is age 18 or older        Passed - No active pregancy on record        Passed - No positive pregnancy test in past 12 months         Barbara Colby RN      "

## 2020-12-01 DIAGNOSIS — M79.89 SWELLING OF LOWER LIMB: ICD-10-CM

## 2020-12-02 RX ORDER — FUROSEMIDE 20 MG
TABLET ORAL
Qty: 90 TABLET | Refills: 0 | Status: SHIPPED | OUTPATIENT
Start: 2020-12-02 | End: 2021-03-03

## 2020-12-02 NOTE — TELEPHONE ENCOUNTER
"Routing refill request to provider for review/approval because:  Labs not current:  CR, Potassium, NA  T'dup for 3 months per provider request for review          Requested Prescriptions   Pending Prescriptions Disp Refills     furosemide (LASIX) 20 MG tablet [Pharmacy Med Name: FUROSEMIDE 20 MG TABLET] 90 tablet 2     Sig: TAKE 1 TABLET BY MOUTH EVERY DAY   Last Written Prescription Date:  2/20/2020  Last Fill Quantity: 90,  # refills: 2   Last office visit: 7/20/2020 with prescribing provider:     Future Office Visit:        Diuretics (Including Combos) Protocol Failed - 12/1/2020 10:54 AM        Failed - Normal serum creatinine on file in past 12 months     Recent Labs   Lab Test 07/13/19  1823   CR 0.89            Failed - Normal serum potassium on file in past 12 months     Recent Labs   Lab Test 07/13/19  1823   POTASSIUM 3.7           Failed - Normal serum sodium on file in past 12 months     Recent Labs   Lab Test 07/13/19  1823   *            Passed - Blood pressure under 140/90 in past 12 months     BP Readings from Last 3 Encounters:   07/20/20 127/60   02/23/20 (!) 170/78   07/15/19 141/77           Passed - Recent (12 mo) or future (30 days) visit within the authorizing provider's specialty     Patient has had an office visit with the authorizing provider or a provider within the authorizing providers department within the previous 12 mos or has a future within next 30 days. See \"Patient Info\" tab in inbasket, or \"Choose Columns\" in Meds & Orders section of the refill encounter.            Passed - Medication is active on med list        Passed - Patient is age 18 or older        Passed - No active pregancy on record        Passed - No positive pregnancy test in past 12 months         Barbara Colby RN    "

## 2020-12-09 DIAGNOSIS — I10 ESSENTIAL HYPERTENSION WITH GOAL BLOOD PRESSURE LESS THAN 140/90: ICD-10-CM

## 2020-12-09 RX ORDER — METOPROLOL TARTRATE 25 MG/1
50 TABLET, FILM COATED ORAL 2 TIMES DAILY
Qty: 120 TABLET | Refills: 1 | Status: SHIPPED | OUTPATIENT
Start: 2020-12-09

## 2020-12-11 ENCOUNTER — TELEPHONE (OUTPATIENT)
Dept: INTERNAL MEDICINE | Facility: CLINIC | Age: 80
End: 2020-12-11

## 2020-12-11 NOTE — TELEPHONE ENCOUNTER
[facility] were requesting dr sheppard  fill out a portion of the form. After reviewing the form, the form needs to be completed by patient. It is an estimation of medical, dental, prescription, eyeglasses, hearing aids, batteries, wheelchair or other supplies/equipment that patient may incur. Left a message for patient. Form mailed back to patient

## 2021-01-01 DIAGNOSIS — I10 ESSENTIAL HYPERTENSION WITH GOAL BLOOD PRESSURE LESS THAN 140/90: ICD-10-CM

## 2021-01-04 RX ORDER — METOPROLOL TARTRATE 25 MG/1
50 TABLET, FILM COATED ORAL 2 TIMES DAILY
Qty: 120 TABLET | Refills: 1 | OUTPATIENT
Start: 2021-01-04

## 2021-01-04 NOTE — TELEPHONE ENCOUNTER
Prescription was sent 12/9/2020 for #120 with 1 refill.  Pharmacy notified via E-Prescribe refusal.     NEHA BowieN, RN  [facility]

## 2021-03-02 DIAGNOSIS — M79.89 SWELLING OF LOWER LIMB: ICD-10-CM

## 2021-03-03 RX ORDER — FUROSEMIDE 20 MG
TABLET ORAL
Qty: 90 TABLET | Refills: 0 | Status: SHIPPED | OUTPATIENT
Start: 2021-03-03

## 2021-04-12 DIAGNOSIS — I10 ESSENTIAL HYPERTENSION WITH GOAL BLOOD PRESSURE LESS THAN 140/90: ICD-10-CM

## 2021-04-13 RX ORDER — LISINOPRIL 20 MG/1
TABLET ORAL
Qty: 180 TABLET | Refills: 0 | Status: SHIPPED | OUTPATIENT
Start: 2021-04-13

## 2021-04-13 NOTE — TELEPHONE ENCOUNTER
Routing refill request to provider for review/approval because:  Labs not current:  Creatinine, Potassium    RHETT Bowie, RN  [facility]

## 2022-10-24 NOTE — NURSING NOTE
"Chief Complaint   Patient presents with     Lesion     lesion that bleeds       Initial /64 (BP Location: Left arm, Patient Position: Chair, Cuff Size: Adult Regular)  Pulse 64  Wt 148 lb 8 oz (67.4 kg)  BMI 26.31 kg/m2 Estimated body mass index is 26.31 kg/(m^2) as calculated from the following:    Height as of 10/3/17: 5' 3\" (1.6 m).    Weight as of this encounter: 148 lb 8 oz (67.4 kg).  BP completed using cuff size: regular        The following HM Due: Dexa      The following patient reported/Care Every where data was sent to:  P ABSTRACT QUALITY INITIATIVES [32240]        N/a    Sade Tariq, CHARAN  2017           " English